# Patient Record
Sex: FEMALE | Race: BLACK OR AFRICAN AMERICAN | NOT HISPANIC OR LATINO
[De-identification: names, ages, dates, MRNs, and addresses within clinical notes are randomized per-mention and may not be internally consistent; named-entity substitution may affect disease eponyms.]

---

## 2017-05-04 ENCOUNTER — MEDICATION RENEWAL (OUTPATIENT)
Age: 62
End: 2017-05-04

## 2017-05-04 ENCOUNTER — RX RENEWAL (OUTPATIENT)
Age: 62
End: 2017-05-04

## 2017-08-14 ENCOUNTER — APPOINTMENT (OUTPATIENT)
Dept: INTERNAL MEDICINE | Facility: CLINIC | Age: 62
End: 2017-08-14
Payer: COMMERCIAL

## 2017-08-14 VITALS
BODY MASS INDEX: 22.43 KG/M2 | HEIGHT: 68 IN | WEIGHT: 148 LBS | SYSTOLIC BLOOD PRESSURE: 160 MMHG | TEMPERATURE: 97.8 F | DIASTOLIC BLOOD PRESSURE: 80 MMHG

## 2017-08-14 DIAGNOSIS — S91.011A LACERATION W/OUT FOREIGN BODY, RIGHT ANKLE, INITIAL ENCOUNTER: ICD-10-CM

## 2017-08-14 PROCEDURE — 99213 OFFICE O/P EST LOW 20 MIN: CPT

## 2017-11-15 ENCOUNTER — RX RENEWAL (OUTPATIENT)
Age: 62
End: 2017-11-15

## 2017-11-16 ENCOUNTER — APPOINTMENT (OUTPATIENT)
Dept: INTERNAL MEDICINE | Facility: CLINIC | Age: 62
End: 2017-11-16
Payer: COMMERCIAL

## 2017-11-16 VITALS
TEMPERATURE: 97 F | SYSTOLIC BLOOD PRESSURE: 132 MMHG | BODY MASS INDEX: 22.43 KG/M2 | WEIGHT: 148 LBS | DIASTOLIC BLOOD PRESSURE: 82 MMHG | HEIGHT: 68 IN

## 2017-11-16 PROCEDURE — 99214 OFFICE O/P EST MOD 30 MIN: CPT

## 2017-11-16 RX ORDER — CEPHALEXIN 500 MG/1
500 CAPSULE ORAL
Qty: 20 | Refills: 0 | Status: DISCONTINUED | COMMUNITY
Start: 2017-08-14 | End: 2017-11-16

## 2017-12-28 ENCOUNTER — LABORATORY RESULT (OUTPATIENT)
Age: 62
End: 2017-12-28

## 2017-12-28 ENCOUNTER — APPOINTMENT (OUTPATIENT)
Dept: INTERNAL MEDICINE | Facility: CLINIC | Age: 62
End: 2017-12-28
Payer: COMMERCIAL

## 2017-12-28 PROCEDURE — 36415 COLL VENOUS BLD VENIPUNCTURE: CPT

## 2018-01-03 LAB
25(OH)D3 SERPL-MCNC: 21.9 NG/ML
ALBUMIN SERPL ELPH-MCNC: 4.3 G/DL
ALP BLD-CCNC: 70 U/L
ALT SERPL-CCNC: 9 U/L
ANION GAP SERPL CALC-SCNC: 13 MMOL/L
APPEARANCE: CLEAR
AST SERPL-CCNC: 17 U/L
BASOPHILS # BLD AUTO: 0.01 K/UL
BASOPHILS NFR BLD AUTO: 0.2 %
BILIRUB SERPL-MCNC: 0.7 MG/DL
BILIRUBIN URINE: NEGATIVE
BLOOD URINE: NEGATIVE
BUN SERPL-MCNC: 18 MG/DL
CALCIUM SERPL-MCNC: 9.8 MG/DL
CHLORIDE SERPL-SCNC: 102 MMOL/L
CHOLEST SERPL-MCNC: 180 MG/DL
CHOLEST/HDLC SERPL: 2 RATIO
CO2 SERPL-SCNC: 27 MMOL/L
COLOR: YELLOW
CREAT SERPL-MCNC: 1.23 MG/DL
EOSINOPHIL # BLD AUTO: 0.15 K/UL
EOSINOPHIL NFR BLD AUTO: 2.5 %
ERYTHROCYTE [SEDIMENTATION RATE] IN BLOOD BY WESTERGREN METHOD: 13 MM/HR
GLUCOSE QUALITATIVE U: NEGATIVE MG/DL
GLUCOSE SERPL-MCNC: 94 MG/DL
HCT VFR BLD CALC: 42.4 %
HDLC SERPL-MCNC: 92 MG/DL
HGB BLD-MCNC: 14 G/DL
IMM GRANULOCYTES NFR BLD AUTO: 0.2 %
KETONES URINE: NEGATIVE
LDLC SERPL CALC-MCNC: 79 MG/DL
LDLC SERPL DIRECT ASSAY-MCNC: 87 MG/DL
LEUKOCYTE ESTERASE URINE: NEGATIVE
LYMPHOCYTES # BLD AUTO: 1.39 K/UL
LYMPHOCYTES NFR BLD AUTO: 22.9 %
MAN DIFF?: NORMAL
MCHC RBC-ENTMCNC: 32.3 PG
MCHC RBC-ENTMCNC: 33 GM/DL
MCV RBC AUTO: 97.7 FL
MONOCYTES # BLD AUTO: 0.33 K/UL
MONOCYTES NFR BLD AUTO: 5.4 %
NEUTROPHILS # BLD AUTO: 4.17 K/UL
NEUTROPHILS NFR BLD AUTO: 68.8 %
NITRITE URINE: NEGATIVE
PH URINE: 5.5
PLATELET # BLD AUTO: 331 K/UL
POTASSIUM SERPL-SCNC: 4.6 MMOL/L
PROT SERPL-MCNC: 8.3 G/DL
PROTEIN URINE: NEGATIVE MG/DL
RBC # BLD: 4.34 M/UL
RBC # FLD: 14.9 %
SAVE SPECIMEN: NORMAL
SODIUM SERPL-SCNC: 142 MMOL/L
SPECIFIC GRAVITY URINE: 1.03
T3 SERPL-MCNC: 105 NG/DL
T3RU NFR SERPL: 0.64 INDEX
T4 FREE SERPL-MCNC: 0.9 NG/DL
TRIGL SERPL-MCNC: 44 MG/DL
TSH SERPL-ACNC: 1.62 UIU/ML
UROBILINOGEN URINE: NEGATIVE MG/DL
WBC # FLD AUTO: 6.06 K/UL

## 2018-01-04 ENCOUNTER — APPOINTMENT (OUTPATIENT)
Dept: INTERNAL MEDICINE | Facility: CLINIC | Age: 63
End: 2018-01-04

## 2018-01-18 ENCOUNTER — APPOINTMENT (OUTPATIENT)
Dept: INTERNAL MEDICINE | Facility: CLINIC | Age: 63
End: 2018-01-18
Payer: COMMERCIAL

## 2018-01-18 ENCOUNTER — MEDICATION RENEWAL (OUTPATIENT)
Age: 63
End: 2018-01-18

## 2018-01-18 VITALS
BODY MASS INDEX: 21.22 KG/M2 | SYSTOLIC BLOOD PRESSURE: 130 MMHG | WEIGHT: 140 LBS | DIASTOLIC BLOOD PRESSURE: 82 MMHG | TEMPERATURE: 97.8 F | HEIGHT: 68 IN

## 2018-01-18 DIAGNOSIS — Z09 ENCOUNTER FOR FOLLOW-UP EXAMINATION AFTER COMPLETED TREATMENT FOR CONDITIONS OTHER THAN MALIGNANT NEOPLASM: ICD-10-CM

## 2018-01-18 PROCEDURE — 99396 PREV VISIT EST AGE 40-64: CPT | Mod: 25

## 2018-01-18 PROCEDURE — 94010 BREATHING CAPACITY TEST: CPT

## 2018-01-18 PROCEDURE — 93000 ELECTROCARDIOGRAM COMPLETE: CPT

## 2018-01-22 ENCOUNTER — MEDICATION RENEWAL (OUTPATIENT)
Age: 63
End: 2018-01-22

## 2018-04-25 ENCOUNTER — MEDICATION RENEWAL (OUTPATIENT)
Age: 63
End: 2018-04-25

## 2018-06-20 ENCOUNTER — RX RENEWAL (OUTPATIENT)
Age: 63
End: 2018-06-20

## 2018-06-22 ENCOUNTER — MEDICATION RENEWAL (OUTPATIENT)
Age: 63
End: 2018-06-22

## 2018-10-04 ENCOUNTER — MEDICATION RENEWAL (OUTPATIENT)
Age: 63
End: 2018-10-04

## 2018-10-22 ENCOUNTER — LABORATORY RESULT (OUTPATIENT)
Age: 63
End: 2018-10-22

## 2018-10-22 ENCOUNTER — APPOINTMENT (OUTPATIENT)
Dept: INTERNAL MEDICINE | Facility: CLINIC | Age: 63
End: 2018-10-22
Payer: COMMERCIAL

## 2018-10-22 VITALS
SYSTOLIC BLOOD PRESSURE: 130 MMHG | WEIGHT: 150 LBS | BODY MASS INDEX: 22.73 KG/M2 | DIASTOLIC BLOOD PRESSURE: 80 MMHG | TEMPERATURE: 98.4 F | HEIGHT: 68 IN

## 2018-10-22 DIAGNOSIS — Z09 ENCOUNTER FOR FOLLOW-UP EXAMINATION AFTER COMPLETED TREATMENT FOR CONDITIONS OTHER THAN MALIGNANT NEOPLASM: ICD-10-CM

## 2018-10-22 DIAGNOSIS — R92.8 OTHER ABNORMAL AND INCONCLUSIVE FINDINGS ON DIAGNOSTIC IMAGING OF BREAST: ICD-10-CM

## 2018-10-22 DIAGNOSIS — R92.0 MAMMOGRAPHIC MICROCALCIFICATION FOUND ON DIAGNOSTIC IMAGING OF BREAST: ICD-10-CM

## 2018-10-22 DIAGNOSIS — M81.0 ENCOUNTER FOR FOLLOW-UP EXAMINATION AFTER COMPLETED TREATMENT FOR CONDITIONS OTHER THAN MALIGNANT NEOPLASM: ICD-10-CM

## 2018-10-22 PROCEDURE — 99214 OFFICE O/P EST MOD 30 MIN: CPT | Mod: 25

## 2018-10-22 PROCEDURE — 36415 COLL VENOUS BLD VENIPUNCTURE: CPT

## 2018-10-24 DIAGNOSIS — R77.9 ABNORMALITY OF PLASMA PROTEIN, UNSPECIFIED: ICD-10-CM

## 2018-10-25 LAB
ALBUMIN MFR SERPL ELPH: 48.3 %
ALBUMIN SERPL-MCNC: 4.5 G/DL
ALBUMIN/GLOB SERPL: 0.9 RATIO
ALPHA1 GLOB MFR SERPL ELPH: 4.5 %
ALPHA1 GLOB SERPL ELPH-MCNC: 0.4 G/DL
ALPHA2 GLOB MFR SERPL ELPH: 9.9 %
ALPHA2 GLOB SERPL ELPH-MCNC: 0.9 G/DL
B-GLOBULIN MFR SERPL ELPH: 15.2 %
B-GLOBULIN SERPL ELPH-MCNC: 1.4 G/DL
GAMMA GLOB FLD ELPH-MCNC: 2.1 G/DL
GAMMA GLOB MFR SERPL ELPH: 22.1 %
INTERPRETATION SERPL IEP-IMP: NORMAL
PROT SERPL-MCNC: 9.3 G/DL

## 2018-11-04 ENCOUNTER — FORM ENCOUNTER (OUTPATIENT)
Age: 63
End: 2018-11-04

## 2018-11-05 ENCOUNTER — OUTPATIENT (OUTPATIENT)
Dept: OUTPATIENT SERVICES | Facility: HOSPITAL | Age: 63
LOS: 1 days | End: 2018-11-05
Payer: COMMERCIAL

## 2018-11-05 ENCOUNTER — APPOINTMENT (OUTPATIENT)
Dept: RADIOLOGY | Facility: IMAGING CENTER | Age: 63
End: 2018-11-05
Payer: COMMERCIAL

## 2018-11-05 ENCOUNTER — APPOINTMENT (OUTPATIENT)
Dept: ULTRASOUND IMAGING | Facility: IMAGING CENTER | Age: 63
End: 2018-11-05
Payer: COMMERCIAL

## 2018-11-05 DIAGNOSIS — Z09 ENCOUNTER FOR FOLLOW-UP EXAMINATION AFTER COMPLETED TREATMENT FOR CONDITIONS OTHER THAN MALIGNANT NEOPLASM: ICD-10-CM

## 2018-11-05 DIAGNOSIS — M81.0 AGE-RELATED OSTEOPOROSIS WITHOUT CURRENT PATHOLOGICAL FRACTURE: ICD-10-CM

## 2018-11-05 PROCEDURE — 76536 US EXAM OF HEAD AND NECK: CPT | Mod: 26

## 2018-11-05 PROCEDURE — 77080 DXA BONE DENSITY AXIAL: CPT

## 2018-11-05 PROCEDURE — 76536 US EXAM OF HEAD AND NECK: CPT

## 2018-11-05 PROCEDURE — 77080 DXA BONE DENSITY AXIAL: CPT | Mod: 26

## 2018-11-15 ENCOUNTER — APPOINTMENT (OUTPATIENT)
Dept: MAMMOGRAPHY | Facility: CLINIC | Age: 63
End: 2018-11-15

## 2018-11-15 ENCOUNTER — APPOINTMENT (OUTPATIENT)
Dept: INTERNAL MEDICINE | Facility: CLINIC | Age: 63
End: 2018-11-15

## 2018-11-15 ENCOUNTER — APPOINTMENT (OUTPATIENT)
Dept: ULTRASOUND IMAGING | Facility: CLINIC | Age: 63
End: 2018-11-15

## 2018-11-26 ENCOUNTER — MEDICATION RENEWAL (OUTPATIENT)
Age: 63
End: 2018-11-26

## 2018-12-06 ENCOUNTER — RX RENEWAL (OUTPATIENT)
Age: 63
End: 2018-12-06

## 2018-12-06 ENCOUNTER — MEDICATION RENEWAL (OUTPATIENT)
Age: 63
End: 2018-12-06

## 2018-12-17 ENCOUNTER — FORM ENCOUNTER (OUTPATIENT)
Age: 63
End: 2018-12-17

## 2018-12-18 ENCOUNTER — APPOINTMENT (OUTPATIENT)
Dept: MAMMOGRAPHY | Facility: CLINIC | Age: 63
End: 2018-12-18
Payer: COMMERCIAL

## 2018-12-18 ENCOUNTER — APPOINTMENT (OUTPATIENT)
Dept: ULTRASOUND IMAGING | Facility: CLINIC | Age: 63
End: 2018-12-18
Payer: COMMERCIAL

## 2018-12-18 ENCOUNTER — OUTPATIENT (OUTPATIENT)
Dept: OUTPATIENT SERVICES | Facility: HOSPITAL | Age: 63
LOS: 1 days | End: 2018-12-18
Payer: COMMERCIAL

## 2018-12-18 DIAGNOSIS — Z00.8 ENCOUNTER FOR OTHER GENERAL EXAMINATION: ICD-10-CM

## 2018-12-18 DIAGNOSIS — R92.2 INCONCLUSIVE MAMMOGRAM: ICD-10-CM

## 2018-12-18 DIAGNOSIS — R92.8 OTHER ABNORMAL AND INCONCLUSIVE FINDINGS ON DIAGNOSTIC IMAGING OF BREAST: ICD-10-CM

## 2018-12-18 PROCEDURE — 77067 SCR MAMMO BI INCL CAD: CPT

## 2018-12-18 PROCEDURE — 77067 SCR MAMMO BI INCL CAD: CPT | Mod: 26

## 2018-12-18 PROCEDURE — 76641 ULTRASOUND BREAST COMPLETE: CPT | Mod: 26,50

## 2018-12-18 PROCEDURE — 77063 BREAST TOMOSYNTHESIS BI: CPT | Mod: 26

## 2018-12-18 PROCEDURE — 77063 BREAST TOMOSYNTHESIS BI: CPT

## 2018-12-18 PROCEDURE — 76641 ULTRASOUND BREAST COMPLETE: CPT

## 2019-01-03 NOTE — PHYSICAL EXAM

## 2019-01-03 NOTE — HISTORY OF PRESENT ILLNESS
[FreeTextEntry1] : Patient presents for follow up evaluation for multiple medical concerns including:recent abnormal mammogram-may require excisional biopsy, f/u for enlarged goiter and to discuss treatment for osteoporosis.

## 2019-01-15 ENCOUNTER — RESULT REVIEW (OUTPATIENT)
Age: 64
End: 2019-01-15

## 2019-01-15 ENCOUNTER — APPOINTMENT (OUTPATIENT)
Dept: ULTRASOUND IMAGING | Facility: IMAGING CENTER | Age: 64
End: 2019-01-15
Payer: COMMERCIAL

## 2019-01-15 ENCOUNTER — OUTPATIENT (OUTPATIENT)
Dept: OUTPATIENT SERVICES | Facility: HOSPITAL | Age: 64
LOS: 1 days | End: 2019-01-15
Payer: COMMERCIAL

## 2019-01-15 DIAGNOSIS — Z86.000 PERSONAL HISTORY OF IN-SITU NEOPLASM OF BREAST: ICD-10-CM

## 2019-01-15 DIAGNOSIS — R92.8 OTHER ABNORMAL AND INCONCLUSIVE FINDINGS ON DIAGNOSTIC IMAGING OF BREAST: ICD-10-CM

## 2019-01-15 PROCEDURE — 88360 TUMOR IMMUNOHISTOCHEM/MANUAL: CPT | Mod: 26

## 2019-01-15 PROCEDURE — G0279: CPT | Mod: 26

## 2019-01-15 PROCEDURE — 77065 DX MAMMO INCL CAD UNI: CPT | Mod: 26,LT

## 2019-01-15 PROCEDURE — 88305 TISSUE EXAM BY PATHOLOGIST: CPT | Mod: 26

## 2019-01-17 ENCOUNTER — RX RENEWAL (OUTPATIENT)
Age: 64
End: 2019-01-17

## 2019-01-17 PROCEDURE — 19083 BX BREAST 1ST LESION US IMAG: CPT | Mod: LT

## 2019-01-17 PROCEDURE — 88305 TISSUE EXAM BY PATHOLOGIST: CPT

## 2019-01-17 PROCEDURE — 19083 BX BREAST 1ST LESION US IMAG: CPT

## 2019-01-17 PROCEDURE — 19286 PERQ DEV BREAST ADD US IMAG: CPT

## 2019-01-17 PROCEDURE — C1739: CPT

## 2019-01-17 PROCEDURE — G0279: CPT

## 2019-01-17 PROCEDURE — 19285 PERQ DEV BREAST 1ST US IMAG: CPT

## 2019-01-17 PROCEDURE — 77065 DX MAMMO INCL CAD UNI: CPT

## 2019-01-17 PROCEDURE — 19286 PERQ DEV BREAST ADD US IMAG: CPT | Mod: LT,59

## 2019-01-17 PROCEDURE — 88360 TUMOR IMMUNOHISTOCHEM/MANUAL: CPT

## 2019-01-17 PROCEDURE — 19285 PERQ DEV BREAST 1ST US IMAG: CPT | Mod: LT,59

## 2019-01-21 ENCOUNTER — LABORATORY RESULT (OUTPATIENT)
Age: 64
End: 2019-01-21

## 2019-01-21 ENCOUNTER — APPOINTMENT (OUTPATIENT)
Dept: INTERNAL MEDICINE | Facility: CLINIC | Age: 64
End: 2019-01-21
Payer: COMMERCIAL

## 2019-01-21 PROCEDURE — 36415 COLL VENOUS BLD VENIPUNCTURE: CPT

## 2019-01-28 ENCOUNTER — RX RENEWAL (OUTPATIENT)
Age: 64
End: 2019-01-28

## 2019-01-28 ENCOUNTER — APPOINTMENT (OUTPATIENT)
Dept: MRI IMAGING | Facility: IMAGING CENTER | Age: 64
End: 2019-01-28
Payer: COMMERCIAL

## 2019-01-28 ENCOUNTER — APPOINTMENT (OUTPATIENT)
Dept: RADIOLOGY | Facility: IMAGING CENTER | Age: 64
End: 2019-01-28
Payer: COMMERCIAL

## 2019-01-28 ENCOUNTER — OUTPATIENT (OUTPATIENT)
Dept: OUTPATIENT SERVICES | Facility: HOSPITAL | Age: 64
LOS: 1 days | End: 2019-01-28
Payer: COMMERCIAL

## 2019-01-28 DIAGNOSIS — Z00.8 ENCOUNTER FOR OTHER GENERAL EXAMINATION: ICD-10-CM

## 2019-01-28 PROCEDURE — C8908: CPT

## 2019-01-28 PROCEDURE — A9585: CPT

## 2019-01-28 PROCEDURE — 71046 X-RAY EXAM CHEST 2 VIEWS: CPT

## 2019-01-28 PROCEDURE — 77049 MRI BREAST C-+ W/CAD BI: CPT | Mod: 26

## 2019-01-28 PROCEDURE — C8937: CPT

## 2019-01-28 PROCEDURE — 71046 X-RAY EXAM CHEST 2 VIEWS: CPT | Mod: 26

## 2019-02-14 ENCOUNTER — APPOINTMENT (OUTPATIENT)
Dept: INTERNAL MEDICINE | Facility: CLINIC | Age: 64
End: 2019-02-14
Payer: COMMERCIAL

## 2019-02-14 ENCOUNTER — LABORATORY RESULT (OUTPATIENT)
Age: 64
End: 2019-02-14

## 2019-02-14 VITALS
DIASTOLIC BLOOD PRESSURE: 90 MMHG | TEMPERATURE: 97.4 F | HEART RATE: 80 BPM | BODY MASS INDEX: 23.19 KG/M2 | WEIGHT: 153 LBS | SYSTOLIC BLOOD PRESSURE: 140 MMHG | OXYGEN SATURATION: 98 % | HEIGHT: 68 IN

## 2019-02-14 VITALS — DIASTOLIC BLOOD PRESSURE: 90 MMHG | SYSTOLIC BLOOD PRESSURE: 136 MMHG

## 2019-02-14 DIAGNOSIS — Z01.818 ENCOUNTER FOR OTHER PREPROCEDURAL EXAMINATION: ICD-10-CM

## 2019-02-14 PROCEDURE — 93000 ELECTROCARDIOGRAM COMPLETE: CPT

## 2019-02-14 PROCEDURE — 36415 COLL VENOUS BLD VENIPUNCTURE: CPT

## 2019-02-14 PROCEDURE — 99214 OFFICE O/P EST MOD 30 MIN: CPT | Mod: 25

## 2019-02-14 RX ORDER — ALENDRONATE SODIUM 70 MG/1
70 TABLET ORAL
Qty: 12 | Refills: 3 | Status: DISCONTINUED | COMMUNITY
Start: 2017-11-16 | End: 2019-02-14

## 2019-02-14 RX ORDER — HYDROCODONE BITARTRATE AND HOMATROPINE METHYLBROMIDE 5; 1.5 MG/5ML; MG/5ML
5-1.5 SYRUP ORAL
Qty: 480 | Refills: 0 | Status: DISCONTINUED | COMMUNITY
Start: 2018-11-07 | End: 2019-02-14

## 2019-02-14 NOTE — PHYSICAL EXAM
[No Acute Distress] : no acute distress [Well Nourished] : well nourished [Well Developed] : well developed [Normal Sclera/Conjunctiva] : normal sclera/conjunctiva [PERRL] : pupils equal round and reactive to light [No JVD] : no jugular venous distention [Supple] : supple [No Lymphadenopathy] : no lymphadenopathy [No Respiratory Distress] : no respiratory distress  [Clear to Auscultation] : lungs were clear to auscultation bilaterally [No Accessory Muscle Use] : no accessory muscle use [Normal Rate] : normal rate  [Regular Rhythm] : with a regular rhythm [Pedal Pulses Present] : the pedal pulses are present [No Edema] : there was no peripheral edema [Normal Posterior Cervical Nodes] : no posterior cervical lymphadenopathy [Normal Anterior Cervical Nodes] : no anterior cervical lymphadenopathy [No CVA Tenderness] : no CVA  tenderness [No Joint Swelling] : no joint swelling [Grossly Normal Strength/Tone] : grossly normal strength/tone [No Rash] : no rash [Normal Affect] : the affect was normal [Normal Insight/Judgement] : insight and judgment were intact

## 2019-02-20 LAB
ALBUMIN SERPL ELPH-MCNC: 4.4 G/DL
ALP BLD-CCNC: 65 U/L
ALT SERPL-CCNC: 11 U/L
ANION GAP SERPL CALC-SCNC: 14 MMOL/L
APTT BLD: 32.1 SEC
AST SERPL-CCNC: 21 U/L
BASOPHILS # BLD AUTO: 0.01 K/UL
BASOPHILS NFR BLD AUTO: 0.2 %
BILIRUB SERPL-MCNC: 0.7 MG/DL
BUN SERPL-MCNC: 20 MG/DL
CALCIUM SERPL-MCNC: 9.9 MG/DL
CHLORIDE SERPL-SCNC: 101 MMOL/L
CO2 SERPL-SCNC: 25 MMOL/L
CREAT SERPL-MCNC: 1.12 MG/DL
EOSINOPHIL # BLD AUTO: 0.11 K/UL
EOSINOPHIL NFR BLD AUTO: 2.2 %
GLUCOSE SERPL-MCNC: 88 MG/DL
HCT VFR BLD CALC: 37.7 %
HGB BLD-MCNC: 12.2 G/DL
IMM GRANULOCYTES NFR BLD AUTO: 0.4 %
INR PPP: 1.01 RATIO
LYMPHOCYTES # BLD AUTO: 1.46 K/UL
LYMPHOCYTES NFR BLD AUTO: 29.6 %
MAN DIFF?: NORMAL
MCHC RBC-ENTMCNC: 31.7 PG
MCHC RBC-ENTMCNC: 32.4 GM/DL
MCV RBC AUTO: 97.9 FL
MONOCYTES # BLD AUTO: 0.39 K/UL
MONOCYTES NFR BLD AUTO: 7.9 %
NEUTROPHILS # BLD AUTO: 2.94 K/UL
NEUTROPHILS NFR BLD AUTO: 59.7 %
PLATELET # BLD AUTO: 308 K/UL
POTASSIUM SERPL-SCNC: 4.3 MMOL/L
PROT SERPL-MCNC: 8.5 G/DL
PT BLD: 11.5 SEC
RBC # BLD: 3.85 M/UL
RBC # FLD: 16.2 %
SODIUM SERPL-SCNC: 140 MMOL/L
WBC # FLD AUTO: 4.93 K/UL

## 2019-02-20 NOTE — RESULTS/DATA
[ECG Reviewed] : reviewed [No Acute Ischemia] : No acute ischemia [NSR] : normal sinus rhythm [No Interval Change] : no interval change [] : results reviewed

## 2019-02-20 NOTE — HISTORY OF PRESENT ILLNESS
[No Pertinent Cardiac History] : no history of aortic stenosis, atrial fibrillation, coronary artery disease, recent myocardial infarction, or implantable device/pacemaker [Smoker] : smoker [No Adverse Anesthesia Reaction] : no adverse anesthesia reaction in self or family member [(Patient denies any chest pain, claudication, dyspnea on exertion, orthopnea, palpitations or syncope)] : Patient denies any chest pain, claudication, dyspnea on exertion, orthopnea, palpitations or syncope [Good (7-10 METs)] : Good (7-10 METs) [Asthma] : no asthma [COPD] : no COPD [Sleep Apnea] : no sleep apnea [Chronic Anticoagulation] : no chronic anticoagulation [Chronic Kidney Disease] : no chronic kidney disease [Diabetes] : no diabetes [FreeTextEntry1] : Left breast Lumpectomy [FreeTextEntry2] : 2/21/2019 [FreeTextEntry3] : Dr. Vivek Mayfield

## 2019-02-20 NOTE — ASSESSMENT
[High Risk Surgery - Intraperitoneal, Intrathoracic or Supringuinal Vascular Procedures] : High Risk Surgery - Intraperitoneal, Intrathoracic or Supringuinal Vascular Procedures - No (0) [Ischemic Heart Disease] : Ischemic Heart Disease - No (0) [Congestive Heart Failure] : Congestive Heart Failure - No (0) [Prior Cerebrovascular Accident or TIA] : Prior Cerebrovascular Accident or TIA - No (0) [Creatinine >= 2mg/dL (1 Point)] : Creatinine >= 2mg/dL - No (0) [Insulin-dependent Diabetic (1 Point)] : Insulin-dependent Diabetic - No (0) [0] : 0 , RCRI Class: I, Risk of Post-Op Cardiac Complications: 0.4%, Procedure Risk: Low-Risk [No Further Testing Recommended] : no further testing recommended [Modify anti-platelet treatment prior to procedure] : Modify anti-platelet treatment prior to procedure [Continue medications as is] : Continue current medications [As per surgery] : as per surgery [Patient Optimized for Surgery] : Patient optimized for surgery [FreeTextEntry6] : Avoid ASA/NSAIDS 5 days before surgery

## 2019-02-25 ENCOUNTER — TRANSCRIPTION ENCOUNTER (OUTPATIENT)
Age: 64
End: 2019-02-25

## 2019-03-13 ENCOUNTER — RX RENEWAL (OUTPATIENT)
Age: 64
End: 2019-03-13

## 2019-04-11 ENCOUNTER — LABORATORY RESULT (OUTPATIENT)
Age: 64
End: 2019-04-11

## 2019-04-11 ENCOUNTER — APPOINTMENT (OUTPATIENT)
Dept: INTERNAL MEDICINE | Facility: CLINIC | Age: 64
End: 2019-04-11
Payer: COMMERCIAL

## 2019-04-11 VITALS
WEIGHT: 152 LBS | BODY MASS INDEX: 23.04 KG/M2 | DIASTOLIC BLOOD PRESSURE: 80 MMHG | HEIGHT: 68 IN | TEMPERATURE: 97 F | SYSTOLIC BLOOD PRESSURE: 140 MMHG

## 2019-04-11 DIAGNOSIS — F17.200 NICOTINE DEPENDENCE, UNSPECIFIED, UNCOMPLICATED: ICD-10-CM

## 2019-04-11 DIAGNOSIS — K52.9 NONINFECTIVE GASTROENTERITIS AND COLITIS, UNSPECIFIED: ICD-10-CM

## 2019-04-11 DIAGNOSIS — T75.3XXA MOTION SICKNESS, INITIAL ENCOUNTER: ICD-10-CM

## 2019-04-11 DIAGNOSIS — Z87.891 PERSONAL HISTORY OF NICOTINE DEPENDENCE: ICD-10-CM

## 2019-04-11 PROCEDURE — 36415 COLL VENOUS BLD VENIPUNCTURE: CPT

## 2019-04-11 PROCEDURE — 94010 BREATHING CAPACITY TEST: CPT

## 2019-04-11 PROCEDURE — 99396 PREV VISIT EST AGE 40-64: CPT | Mod: 25

## 2019-04-11 NOTE — DATA REVIEWED
[FreeTextEntry1] : pt declined EKG today-had EKG with recent preop visit.\par PFT revealed mild restriction-not significantly changed from PFT from 01/18/2018\par

## 2019-04-11 NOTE — PHYSICAL EXAM
[de-identified] : s/p old left lumpectomy scar (from 19 years ago at 7 O'clock position) and new lumpectomy scar along left outer breast- with localized post radiation changes and induration/ s./p left axillary LN dissection scar-c/d/i

## 2019-04-11 NOTE — ASSESSMENT
[FreeTextEntry1] : I discussed Shingrix vaccine with pt.\par She had Zostavax 2 years ago.  She will hold off on the Shingrix vaccine for now.

## 2019-04-11 NOTE — HEALTH RISK ASSESSMENT
[Patient reported colonoscopy was normal] : Patient reported colonoscopy was normal [Patient reported mammogram was abnormal] : Patient reported mammogram was abnormal [Good] : ~his/her~  mood as  good [16-20] : 16-20 [de-identified] : quit 2/2019 before her breast surgery [] : No [MammogramDate] : 01/19 [de-identified] : drinks1-2 runm cocktails every night [MammogramComments] : was diagnosed with breast cancer-will f/u with Dr. Mayfield 9/2019 [PapSmearDate] : 04/18 [PapSmearComments] : with Dr. Martinez [BoneDensityDate] : 11/18 [ColonoscopyDate] : 11/16 [BoneDensityComments] : T scores were -2.1 and -1.6 [ColonoscopyComments] : with [facility]

## 2019-04-11 NOTE — HISTORY OF PRESENT ILLNESS
[FreeTextEntry1] : Patient presents today for CPE.\par  [de-identified] : Pt was diagnosed with invasive ductal carcinoma of the left breast- underwent  left lumpectomy 2/21/2019 with Dr. Vivek Mayfield at [facility] and had intra-operative localized XRT. 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  \par She had f/u with oncologist, Dr. Mavis Wharton in [facility] and they are awaiting pathology from surgical specimen to see if she will require hormonal treatment or possible adjuvant chemotherapy.\par \par Quit smoking with acupuncture -7 weeks ago- shortly before surgery and doing well since stopping.\par \par She enjoys traveling.  She traveled to [facility] 3 weeks post lumpectomy with her sister and niece.\par She will be traveling to [place] with her other sister next week.\par \par For the past 2 months-she complains of daily watery diarrheal symptoms-2-3 times a day with copious mucous at times and occasional blood from internal hemorrhoids.  No significant abdominal cramping/ no fevers or weight loss but concerned the diarrhea is not resolving.  She has not had a well formed stool for 2 months since before her lumpectomy.\par

## 2019-04-15 LAB
25(OH)D3 SERPL-MCNC: 18.1 NG/ML
ALBUMIN SERPL ELPH-MCNC: 4.9 G/DL
ALP BLD-CCNC: 70 U/L
ALT SERPL-CCNC: 12 U/L
ANION GAP SERPL CALC-SCNC: 17 MMOL/L
APPEARANCE: ABNORMAL
AST SERPL-CCNC: 21 U/L
BASOPHILS # BLD AUTO: 0.03 K/UL
BASOPHILS NFR BLD AUTO: 0.7 %
BILIRUB SERPL-MCNC: 0.7 MG/DL
BILIRUBIN URINE: NEGATIVE
BLOOD URINE: NEGATIVE
BUN SERPL-MCNC: 14 MG/DL
CALCIUM SERPL-MCNC: 9.8 MG/DL
CHLORIDE SERPL-SCNC: 101 MMOL/L
CHOLEST SERPL-MCNC: 211 MG/DL
CHOLEST/HDLC SERPL: 2.5 RATIO
CO2 SERPL-SCNC: 24 MMOL/L
COLOR: YELLOW
CREAT SERPL-MCNC: 1.14 MG/DL
EOSINOPHIL # BLD AUTO: 0.19 K/UL
EOSINOPHIL NFR BLD AUTO: 4.1 %
ERYTHROCYTE [SEDIMENTATION RATE] IN BLOOD BY WESTERGREN METHOD: 9 MM/HR
ESTIMATED AVERAGE GLUCOSE: 105 MG/DL
GLUCOSE QUALITATIVE U: NEGATIVE
GLUCOSE SERPL-MCNC: 84 MG/DL
HBA1C MFR BLD HPLC: 5.3 %
HCT VFR BLD CALC: 42.6 %
HDLC SERPL-MCNC: 86 MG/DL
HGB BLD-MCNC: 13.3 G/DL
IMM GRANULOCYTES NFR BLD AUTO: 0.2 %
KETONES URINE: NEGATIVE
LDLC SERPL CALC-MCNC: 110 MG/DL
LDLC SERPL DIRECT ASSAY-MCNC: 121 MG/DL
LEUKOCYTE ESTERASE URINE: NEGATIVE
LYMPHOCYTES # BLD AUTO: 2.02 K/UL
LYMPHOCYTES NFR BLD AUTO: 43.8 %
MAN DIFF?: NORMAL
MCHC RBC-ENTMCNC: 31.1 PG
MCHC RBC-ENTMCNC: 31.2 GM/DL
MCV RBC AUTO: 99.8 FL
MONOCYTES # BLD AUTO: 0.45 K/UL
MONOCYTES NFR BLD AUTO: 9.8 %
NEUTROPHILS # BLD AUTO: 1.91 K/UL
NEUTROPHILS NFR BLD AUTO: 41.4 %
NITRITE URINE: NEGATIVE
PH URINE: 6
PLATELET # BLD AUTO: 303 K/UL
POTASSIUM SERPL-SCNC: 4.2 MMOL/L
PROT SERPL-MCNC: 8.8 G/DL
PROTEIN URINE: NORMAL
RBC # BLD: 4.27 M/UL
RBC # FLD: 15 %
SAVE SPECIMEN: NORMAL
SODIUM SERPL-SCNC: 142 MMOL/L
SPECIFIC GRAVITY URINE: 1.02
T3 SERPL-MCNC: 118 NG/DL
T3FREE SERPL-MCNC: 3.25 PG/ML
T3RU NFR SERPL: 0.7 TBI
T4 FREE SERPL-MCNC: 1 NG/DL
T4 SERPL-MCNC: 6.1 UG/DL
TRIGL SERPL-MCNC: 75 MG/DL
TSH SERPL-ACNC: 5.42 UIU/ML
UROBILINOGEN URINE: NORMAL
WBC # FLD AUTO: 4.61 K/UL

## 2019-04-16 ENCOUNTER — TRANSCRIPTION ENCOUNTER (OUTPATIENT)
Age: 64
End: 2019-04-16

## 2019-04-29 DIAGNOSIS — A49.8 OTHER BACTERIAL INFECTIONS OF UNSPECIFIED SITE: ICD-10-CM

## 2019-05-01 PROBLEM — A49.8 INFECTION DUE TO NON-O157 SHIGA TOXIN-PRODUCING ESCHERICHIA COLI (E.COLI): Status: ACTIVE | Noted: 2019-05-01

## 2019-05-01 LAB
C DIFF TOX GENS STL QL NAA+PROBE: NORMAL
CDIFF BY PCR: NOT DETECTED
G LAMBLIA AG STL QL: NORMAL

## 2019-05-01 RX ORDER — AZITHROMYCIN 250 MG/1
250 TABLET, FILM COATED ORAL
Qty: 1 | Refills: 0 | Status: COMPLETED | COMMUNITY
Start: 2019-04-11 | End: 2019-05-01

## 2019-05-03 LAB — GI PCR PANEL, STOOL: ABNORMAL

## 2019-05-16 DIAGNOSIS — R19.7 DIARRHEA, UNSPECIFIED: ICD-10-CM

## 2019-05-16 DIAGNOSIS — K92.1 MELENA: ICD-10-CM

## 2019-05-18 ENCOUNTER — MOBILE ON CALL (OUTPATIENT)
Age: 64
End: 2019-05-18

## 2019-05-20 LAB
BACTERIA STL CULT: NORMAL
C DIFF TOX GENS STL QL NAA+PROBE: NORMAL
CDIFF BY PCR: NOT DETECTED
GI PCR PANEL, STOOL: NORMAL

## 2019-05-21 LAB — DEPRECATED O AND P PREP STL: NORMAL

## 2019-06-10 ENCOUNTER — EMERGENCY (EMERGENCY)
Facility: HOSPITAL | Age: 64
LOS: 1 days | Discharge: HOME | End: 2019-06-10
Attending: EMERGENCY MEDICINE
Payer: COMMERCIAL

## 2019-06-10 ENCOUNTER — APPOINTMENT (OUTPATIENT)
Dept: INTERNAL MEDICINE | Facility: CLINIC | Age: 64
End: 2019-06-10
Payer: COMMERCIAL

## 2019-06-10 VITALS
SYSTOLIC BLOOD PRESSURE: 173 MMHG | OXYGEN SATURATION: 98 % | HEART RATE: 72 BPM | TEMPERATURE: 99 F | WEIGHT: 153 LBS | DIASTOLIC BLOOD PRESSURE: 99 MMHG | HEIGHT: 68 IN | RESPIRATION RATE: 16 BRPM

## 2019-06-10 VITALS
DIASTOLIC BLOOD PRESSURE: 90 MMHG | HEIGHT: 68 IN | WEIGHT: 153 LBS | BODY MASS INDEX: 23.19 KG/M2 | SYSTOLIC BLOOD PRESSURE: 140 MMHG | TEMPERATURE: 97.6 F

## 2019-06-10 VITALS — SYSTOLIC BLOOD PRESSURE: 161 MMHG | DIASTOLIC BLOOD PRESSURE: 94 MMHG | HEART RATE: 65 BPM

## 2019-06-10 DIAGNOSIS — S09.90XA UNSPECIFIED INJURY OF HEAD, INITIAL ENCOUNTER: ICD-10-CM
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
## 2019-06-10 DIAGNOSIS — Z98.890 OTHER SPECIFIED POSTPROCEDURAL STATES: Chronic | ICD-10-CM

## 2019-06-10 PROCEDURE — 73502 X-RAY EXAM HIP UNI 2-3 VIEWS: CPT | Mod: 26,RT

## 2019-06-10 PROCEDURE — 72220 X-RAY EXAM SACRUM TAILBONE: CPT | Mod: 26

## 2019-06-10 PROCEDURE — 99283 EMERGENCY DEPT VISIT LOW MDM: CPT

## 2019-06-10 PROCEDURE — 99284 EMERGENCY DEPT VISIT MOD MDM: CPT

## 2019-06-10 PROCEDURE — 99213 OFFICE O/P EST LOW 20 MIN: CPT

## 2019-06-10 PROCEDURE — 73502 X-RAY EXAM HIP UNI 2-3 VIEWS: CPT

## 2019-06-10 PROCEDURE — 72220 X-RAY EXAM SACRUM TAILBONE: CPT

## 2019-06-10 RX ORDER — ACETAMINOPHEN 500 MG
650 TABLET ORAL ONCE
Refills: 0 | Status: COMPLETED | OUTPATIENT
Start: 2019-06-10 | End: 2019-06-10

## 2019-06-10 RX ORDER — OXYCODONE AND ACETAMINOPHEN 5; 325 MG/1; MG/1
1 TABLET ORAL ONCE
Refills: 0 | Status: DISCONTINUED | OUTPATIENT
Start: 2019-06-10 | End: 2019-06-10

## 2019-06-10 RX ORDER — OXYCODONE HYDROCHLORIDE 5 MG/1
1 TABLET ORAL
Qty: 12 | Refills: 0
Start: 2019-06-10 | End: 2019-06-13

## 2019-06-10 RX ORDER — CIPROFLOXACIN HYDROCHLORIDE 500 MG/1
500 TABLET, FILM COATED ORAL TWICE DAILY
Qty: 10 | Refills: 0 | Status: DISCONTINUED | COMMUNITY
Start: 2019-05-01 | End: 2019-06-10

## 2019-06-10 RX ADMIN — OXYCODONE AND ACETAMINOPHEN 1 TABLET(S): 5; 325 TABLET ORAL at 13:35

## 2019-06-10 RX ADMIN — Medication 650 MILLIGRAM(S): at 13:35

## 2019-06-10 RX ADMIN — Medication 650 MILLIGRAM(S): at 12:58

## 2019-06-10 RX ADMIN — OXYCODONE AND ACETAMINOPHEN 1 TABLET(S): 5; 325 TABLET ORAL at 12:58

## 2019-06-10 NOTE — ED PROVIDER NOTE - CARE PLAN
Principal Discharge DX:	Gluteal pain  Secondary Diagnosis:	Fall, initial encounter  Secondary Diagnosis:	Musculoskeletal pain

## 2019-06-10 NOTE — ED PROVIDER NOTE - PHYSICAL EXAMINATION
*GEN: NAD; well appearing; A+O x3   *HEAD: NC/AT   *EYES/NOSE: PERRL & EOMI b/l  *THROAT: airway patent, moist mucous membranes  *NECK: Neck supple, no masses, no midline vertebral ttp, paracervical ttp  *PULMONARY: CTA b/l, symmetric breath sounds.   *CARDIAC: s1s2, regular rhythm, no Murmur  *ABDOMEN:  ND, NT, soft, no guarding, no rebound, no masses   *BACK: no CVA tenderness, Normal  spine, no midline vertebral ttp; ttp R soft tissue gluteal region  *EXTREMITIES: symmetric pulses, 2+ dp & radial pulses, capillary refill < 2 seconds, no cyanosis, no edema   *SKIN: no rash or bruising, occiput hematoma that is not boggy  *NEUROLOGIC: alert, CN 2-12 intact, moves all 4 extremities, full active & passive ROM in all extremities, able to walk independently w/o assistance  *PSYCH: insight and judgment nl, memory nl, affect nl, thought nl

## 2019-06-10 NOTE — ED PROVIDER NOTE - NS ED ROS FT
Review of Systems:  	•	CONSTITUTIONAL: no fever  	•	SKIN: no rash  	•	RESPIRATORY: no shortness of breath  	•	CARDIAC: no chest pain, no palpitations  	•	GI:  no abd pain, no nausea, no vomiting, no diarrhea  	•	GENITO-URINARY:  no dysuria; no hematuria    	•	MUSCULOSKELETAL:  no back pain, + neck pain, + R gluteal pain  	•	NEUROLOGIC: no weakness  	•	ALLERGY: no rhinitis  	•	PSYSCHIATRIC: no anxiety

## 2019-06-10 NOTE — ED PROVIDER NOTE - ATTENDING CONTRIBUTION TO CARE
I have seen and evaluated this patient with the resident.   I agree with the findings  unless other wise stated.  I have made appropriate changes in documentations where needed, After my face to face bedside evaluation, I am further  noting: Pt with  R soft tissue gluteal region w/o any point bony ttp but will xray to eval for fx. 4d s/p fall with head trauma with resolving occiput hematoma, no HA, normal neuro exam & pt not on any AC. shared decision made with pt to not ct h. bp elevated likely due to pain, will recheck after analgesics

## 2019-06-10 NOTE — ED ADULT NURSE NOTE - OBJECTIVE STATEMENT
64 y.o female arrived via EMS from PMD for fall that occurred in [place] ([facility]) on Thursday 6/6/19. Pt was around the pool when she got up to use bathroom. She was rushing to get to facilities when she "slipped". Pt doesn't recall all events of fall and s/p fall. Pt was drinking at time of event but not in large quantities. Swelling to back of head upon exam. Pt denies HA. No vomiting. Denies nausea. no change in vision. Follows commands. Pt able to ambulate but c discomfort to R hip. Not on blood thinners. Lumpectomy 2/2019. Not currently on chemo.

## 2019-06-10 NOTE — ED PROVIDER NOTE - CLINICAL SUMMARY MEDICAL DECISION MAKING FREE TEXT BOX
ttp R soft tissue gluteal region w/o any point bony ttp but will xray to eval for fx. 4d s/p fall with head trauma with resolving occiput hematoma, no HA, normal neuro exam & pt not on any AC. shared decision made with pt to not ct h. bp elevated likely due to pain, will recheck after analgesics

## 2019-06-10 NOTE — ED PROVIDER NOTE - OBJECTIVE STATEMENT
Pertinent HPI/PMH/PSH/FHx/SHx and Review of Systems contained within  HPI:  64yoF bibems from PMD office with CC R hip pain. 4d while visiting [place] fell near pool.   PMH/PSH: breast cancer, sleep disturbance on valium & ambien  ROS positive for:   ROS negative for: fever, Chest pain, SOB, Nausea, vomiting, diarrhea, abdominal pain, dysuria    FamilyHx and SocialHx not otherwise contributory  PMD: Arely Leonard Pertinent HPI/PMH/PSH/FHx/SHx and Review of Systems contained within  HPI:  64yoF bibems from PMD office with CC R hip pain x4d s/p fall. pain around R posterior gluteal region, 4d while visiting [place] pt slipped & fell near pool with loc & head trauma. had occiput swelling which is resolving. ROS positive for: paracervical neck pain  PMH/PSH: breast cancer, sleep disturbance on valium & ambien    ROS negative for: fever, Chest pain, SOB, Nausea, vomiting, diarrhea, abdominal pain, dysuria    FamilyHx and SocialHx not otherwise contributory  PMD: Arely Lenoard Pertinent HPI/PMH/PSH/FHx/SHx and Review of Systems contained within  HPI:  64yoF bibems from PMD office with CC R hip pain x4d s/p fall. pain around R posterior gluteal region, worse w/ standing up. 4d while visiting [place] pt slipped & fell near pool with loc & head trauma. had occiput swelling which is resolving. ROS positive for: paracervical neck pain  PMH/PSH: breast cancer, sleep disturbance on valium & ambien  ROS negative for: fever, Chest pain, SOB, Nausea, vomiting, diarrhea, abdominal pain, HA, urinary fecal incontinence  FamilyHx and SocialHx not otherwise contributory  PMD: Arely Leonard

## 2019-06-10 NOTE — ED PROVIDER NOTE - NSFOLLOWUPINSTRUCTIONS_ED_ALL_ED_FT
FOLLOW UP WITH PMD  WITHIN 1-2DAYS, CALL TO MAKE APPOINTMENT  COME BACK TO ED IF YOUR CONDITION WORSENS OR IF YOU DEVELOP FEVER GREATER THAN 100.4F, CHEST PAIN,  SHORTNESS OF BREATH OR ANY OTHER SYMPTOMS CONCERNING TO YOU  TAKE TYLENOL (ACETAMINOPHEN) 650 MG EVERY 6 HOURS AS NEEDED FOR PAIN    IF YOU WERE PRESRCIBED ANY MEDICATIONS FROM TODAY'S VISIT, TAKE THEM AS DIRECTED  (OXYCODONE)

## 2019-06-10 NOTE — ED ADULT NURSE NOTE - NSIMPLEMENTINTERV_GEN_ALL_ED
Implemented All Fall Risk Interventions:  Nancy to call system. Call bell, personal items and telephone within reach. Instruct patient to call for assistance. Room bathroom lighting operational. Non-slip footwear when patient is off stretcher. Physically safe environment: no spills, clutter or unnecessary equipment. Stretcher in lowest position, wheels locked, appropriate side rails in place. Provide visual cue, wrist band, yellow gown, etc. Monitor gait and stability. Monitor for mental status changes and reorient to person, place, and time. Review medications for side effects contributing to fall risk. Reinforce activity limits and safety measures with patient and family.

## 2019-06-12 ENCOUNTER — LABORATORY RESULT (OUTPATIENT)
Age: 64
End: 2019-06-12

## 2019-06-12 ENCOUNTER — APPOINTMENT (OUTPATIENT)
Dept: GASTROENTEROLOGY | Facility: CLINIC | Age: 64
End: 2019-06-12
Payer: COMMERCIAL

## 2019-06-12 DIAGNOSIS — K52.3 INDETERMINATE COLITIS: ICD-10-CM

## 2019-06-12 PROCEDURE — 45380 COLONOSCOPY AND BIOPSY: CPT

## 2019-08-01 LAB
BAKER'S YEAST AB QL: 8.2 UNITS
BAKER'S YEAST IGA QL IA: 9.1 UNITS
BAKER'S YEAST IGA QN IA: NEGATIVE
BAKER'S YEAST IGG QN IA: NEGATIVE
MPO AB + PR3 PNL SER: NORMAL

## 2019-08-26 ENCOUNTER — APPOINTMENT (OUTPATIENT)
Dept: INTERNAL MEDICINE | Facility: CLINIC | Age: 64
End: 2019-08-26
Payer: COMMERCIAL

## 2019-08-26 VITALS
SYSTOLIC BLOOD PRESSURE: 136 MMHG | BODY MASS INDEX: 24.1 KG/M2 | OXYGEN SATURATION: 98 % | HEART RATE: 68 BPM | HEIGHT: 68 IN | TEMPERATURE: 97.2 F | DIASTOLIC BLOOD PRESSURE: 84 MMHG | WEIGHT: 159 LBS

## 2019-08-26 VITALS — DIASTOLIC BLOOD PRESSURE: 90 MMHG | SYSTOLIC BLOOD PRESSURE: 130 MMHG

## 2019-08-26 DIAGNOSIS — R03.0 ELEVATED BLOOD-PRESSURE READING, W/OUT DIAGNOSIS OF HYPERTENSION: ICD-10-CM

## 2019-08-26 PROCEDURE — 99214 OFFICE O/P EST MOD 30 MIN: CPT

## 2019-08-26 NOTE — HISTORY OF PRESENT ILLNESS
[FreeTextEntry8] : CC lump- eye\par pt dev Lump R upper eyelid..- started Wed- now worse.mild constant dull  pain over the area of the sty( worse w palpation)- both on upper eyelid, and lower eyelid.  using cool compress over the eye- it helps. \par pt doesn't wear contacts or eye make up.  mild blurry vision in the R eye. \par no eye discharge.  crusting of R eye in the AM - since the weekend.\par no fever\par \par elev BP- non compliant w diet\par \par pt had new breast ca in the same breast- pt had lumpectomy and introoperative XRT

## 2019-08-26 NOTE — PHYSICAL EXAM
[Normal Sclera/Conjunctiva] : normal sclera/conjunctiva [PERRL] : pupils equal round and reactive to light [EOMI] : extraocular movements intact [de-identified] : large sty R upper eyelid.  small sty- lower eyelid

## 2019-08-26 NOTE — HEALTH RISK ASSESSMENT
[Yes] : Yes [4 or more  times a week (4 pts)] : 4 or more  times a week (4 points) [1 or 2 (0 pts)] : 1 or 2 (0 points) [Never (0 pts)] : Never (0 points) [Audit-CScore] : 4

## 2019-08-26 NOTE — PLAN
[FreeTextEntry1] : elev BP- stop alcohol, low salt diet\par sty- warm compress, massage- TID.  if not improving will need to f/u w ophtho

## 2019-09-02 PROBLEM — Z09 FOLLOW UP: Status: ACTIVE | Noted: 2017-11-16

## 2019-09-03 ENCOUNTER — CLINICAL ADVICE (OUTPATIENT)
Age: 64
End: 2019-09-03

## 2019-09-16 ENCOUNTER — RX RENEWAL (OUTPATIENT)
Age: 64
End: 2019-09-16

## 2019-09-30 ENCOUNTER — APPOINTMENT (OUTPATIENT)
Dept: INTERNAL MEDICINE | Facility: CLINIC | Age: 64
End: 2019-09-30
Payer: COMMERCIAL

## 2019-09-30 VITALS
DIASTOLIC BLOOD PRESSURE: 98 MMHG | SYSTOLIC BLOOD PRESSURE: 138 MMHG | BODY MASS INDEX: 24.18 KG/M2 | WEIGHT: 159 LBS | TEMPERATURE: 97.1 F

## 2019-09-30 DIAGNOSIS — R00.2 PALPITATIONS: ICD-10-CM

## 2019-09-30 PROCEDURE — 99214 OFFICE O/P EST MOD 30 MIN: CPT

## 2019-09-30 RX ORDER — MESALAMINE 1.2 G/1
1.2 TABLET, DELAYED RELEASE ORAL DAILY
Qty: 60 | Refills: 5 | Status: COMPLETED | COMMUNITY
Start: 2019-06-12 | End: 2019-09-30

## 2019-09-30 RX ORDER — ANASTROZOLE TABLETS 1 MG/1
1 TABLET ORAL
Refills: 0 | Status: ACTIVE | COMMUNITY

## 2019-10-01 NOTE — PHYSICAL EXAM
[No Acute Distress] : no acute distress [Well Nourished] : well nourished [Well Developed] : well developed [Well-Appearing] : well-appearing [Normal Sclera/Conjunctiva] : normal sclera/conjunctiva [PERRL] : pupils equal round and reactive to light [EOMI] : extraocular movements intact [Normal Outer Ear/Nose] : the outer ears and nose were normal in appearance [Normal Oropharynx] : the oropharynx was normal [No JVD] : no jugular venous distention [No Lymphadenopathy] : no lymphadenopathy [Supple] : supple [Thyroid Normal, No Nodules] : the thyroid was normal and there were no nodules present [No Respiratory Distress] : no respiratory distress  [No Accessory Muscle Use] : no accessory muscle use [Clear to Auscultation] : lungs were clear to auscultation bilaterally [Normal Rate] : normal rate  [Regular Rhythm] : with a regular rhythm [Normal S1, S2] : normal S1 and S2 [No Murmur] : no murmur heard [No Carotid Bruits] : no carotid bruits [No Abdominal Bruit] : a ~M bruit was not heard ~T in the abdomen [No Varicosities] : no varicosities [Pedal Pulses Present] : the pedal pulses are present [No Edema] : there was no peripheral edema [No Extremity Clubbing/Cyanosis] : no extremity clubbing/cyanosis [No Palpable Aorta] : no palpable aorta [Soft] : abdomen soft [Non Tender] : non-tender [Non-distended] : non-distended [No Masses] : no abdominal mass palpated [No HSM] : no HSM [Normal Posterior Cervical Nodes] : no posterior cervical lymphadenopathy [Normal Bowel Sounds] : normal bowel sounds [Normal Anterior Cervical Nodes] : no anterior cervical lymphadenopathy [No CVA Tenderness] : no CVA  tenderness [No Spinal Tenderness] : no spinal tenderness [No Joint Swelling] : no joint swelling [Grossly Normal Strength/Tone] : grossly normal strength/tone [No Rash] : no rash [Coordination Grossly Intact] : coordination grossly intact [Normal Gait] : normal gait [No Focal Deficits] : no focal deficits [Normal Affect] : the affect was normal [Deep Tendon Reflexes (DTR)] : deep tendon reflexes were 2+ and symmetric [Normal Insight/Judgement] : insight and judgment were intact

## 2019-10-01 NOTE — HISTORY OF PRESENT ILLNESS
[FreeTextEntry1] : Patient presents for follow up evaluation for multiple medical concerns including:\par recent increased BP readings at specialists' visits. [de-identified] : She notes BP readings have been 140-150/90s with her specialists recently.\par One time BP was 170's /.\par She denies chest pains or edema but does note headaches and occasional palpitations when stressed and BP readings were elevated.\par \par she does admit to a stressful past year dealing with her diagnosis of breast cancer-she recently underwent lumpectomy and intraoperative XRT and is now maintained on Anastrozole qd.

## 2019-10-01 NOTE — REVIEW OF SYSTEMS
[Fatigue] : fatigue [Palpitations] : palpitations [Headache] : headache [Negative] : Heme/Lymph [Chest Pain] : no chest pain

## 2019-10-01 NOTE — PHYSICAL EXAM
[No Acute Distress] : no acute distress [Well Nourished] : well nourished [Well Developed] : well developed [Well-Appearing] : well-appearing [Normal Sclera/Conjunctiva] : normal sclera/conjunctiva [PERRL] : pupils equal round and reactive to light [EOMI] : extraocular movements intact [Normal Outer Ear/Nose] : the outer ears and nose were normal in appearance [Normal Oropharynx] : the oropharynx was normal [No JVD] : no jugular venous distention [No Lymphadenopathy] : no lymphadenopathy [Supple] : supple [Thyroid Normal, No Nodules] : the thyroid was normal and there were no nodules present [No Respiratory Distress] : no respiratory distress  [No Accessory Muscle Use] : no accessory muscle use [Clear to Auscultation] : lungs were clear to auscultation bilaterally [Normal Rate] : normal rate  [Regular Rhythm] : with a regular rhythm [Normal S1, S2] : normal S1 and S2 [No Murmur] : no murmur heard [No Carotid Bruits] : no carotid bruits [No Abdominal Bruit] : a ~M bruit was not heard ~T in the abdomen [No Varicosities] : no varicosities [Pedal Pulses Present] : the pedal pulses are present [No Edema] : there was no peripheral edema [No Extremity Clubbing/Cyanosis] : no extremity clubbing/cyanosis [No Palpable Aorta] : no palpable aorta [Soft] : abdomen soft [Non Tender] : non-tender [Non-distended] : non-distended [No Masses] : no abdominal mass palpated [No HSM] : no HSM [Normal Posterior Cervical Nodes] : no posterior cervical lymphadenopathy [Normal Bowel Sounds] : normal bowel sounds [Normal Anterior Cervical Nodes] : no anterior cervical lymphadenopathy [No Spinal Tenderness] : no spinal tenderness [No CVA Tenderness] : no CVA  tenderness [No Joint Swelling] : no joint swelling [Grossly Normal Strength/Tone] : grossly normal strength/tone [No Rash] : no rash [Coordination Grossly Intact] : coordination grossly intact [No Focal Deficits] : no focal deficits [Normal Gait] : normal gait [Deep Tendon Reflexes (DTR)] : deep tendon reflexes were 2+ and symmetric [Normal Affect] : the affect was normal [Normal Insight/Judgement] : insight and judgment were intact

## 2019-10-01 NOTE — PLAN
[FreeTextEntry1] : Pt declined medication treatment for her elevated BP at this time.  She prefers to try lifestyle modification first.\par I recommend weight loss, low salt diet and increased exercise.\par \par I also recommend home BP monitoring and contacting me if BP should remain persistently high in the next 2 weeks.

## 2019-10-01 NOTE — HISTORY OF PRESENT ILLNESS
[FreeTextEntry1] : Patient presents for follow up evaluation for multiple medical concerns including:\par recent increased BP readings at specialists' visits. [de-identified] : She notes BP readings have been 140-150/90s with her specialists recently.\par One time BP was 170's /.\par She denies chest pains or edema but does note headaches and occasional palpitations when stressed and BP readings were elevated.\par \par she does admit to a stressful past year dealing with her diagnosis of breast cancer-she recently underwent lumpectomy and intraoperative XRT and is now maintained on Anastrozole qd.

## 2020-01-13 ENCOUNTER — APPOINTMENT (OUTPATIENT)
Dept: INTERNAL MEDICINE | Facility: CLINIC | Age: 65
End: 2020-01-13
Payer: MEDICARE

## 2020-01-13 VITALS
BODY MASS INDEX: 23.19 KG/M2 | HEIGHT: 68 IN | DIASTOLIC BLOOD PRESSURE: 92 MMHG | WEIGHT: 153 LBS | SYSTOLIC BLOOD PRESSURE: 132 MMHG

## 2020-01-13 VITALS
TEMPERATURE: 98.1 F | WEIGHT: 152 LBS | SYSTOLIC BLOOD PRESSURE: 132 MMHG | BODY MASS INDEX: 23.04 KG/M2 | HEIGHT: 68 IN | DIASTOLIC BLOOD PRESSURE: 92 MMHG

## 2020-01-13 DIAGNOSIS — G47.00 INSOMNIA, UNSPECIFIED: ICD-10-CM

## 2020-01-13 PROCEDURE — 99214 OFFICE O/P EST MOD 30 MIN: CPT

## 2020-01-13 PROCEDURE — 93306 TTE W/DOPPLER COMPLETE: CPT | Mod: 26

## 2020-01-13 PROCEDURE — 93306 TTE W/DOPPLER COMPLETE: CPT | Mod: TC

## 2020-01-14 ENCOUNTER — APPOINTMENT (OUTPATIENT)
Dept: GASTROENTEROLOGY | Facility: CLINIC | Age: 65
End: 2020-01-14
Payer: MEDICARE

## 2020-01-14 VITALS
DIASTOLIC BLOOD PRESSURE: 90 MMHG | OXYGEN SATURATION: 95 % | TEMPERATURE: 97.1 F | HEIGHT: 68 IN | SYSTOLIC BLOOD PRESSURE: 128 MMHG | HEART RATE: 79 BPM | BODY MASS INDEX: 23.04 KG/M2 | WEIGHT: 152 LBS

## 2020-01-14 PROCEDURE — 99214 OFFICE O/P EST MOD 30 MIN: CPT

## 2020-01-14 NOTE — HISTORY OF PRESENT ILLNESS
[FreeTextEntry1] : \par I had seen this patient in June 2019 with rectal bleeding; colonoscopy revealed likely ulcerative colitis\par \par Since - taking Lialda daily - helped with the bleeding - but still had mucous and urgency\par \par Has been taking the medication off and on\par \par Over the last few weeks taking the medication regularly - no bleeding, but still with mucous and urgency\par Also - last week had two episodes with painful BMs - strained - stool came out formed but odd shaped - like an "S" and the other like an "L"\par may have to return to bathroom 4 or 5 times; may have lower abd cramps\par No diarrhea\par \par

## 2020-01-14 NOTE — PHYSICAL EXAM
[General Appearance - Alert] : alert [General Appearance - In No Acute Distress] : in no acute distress [Neck Cervical Mass (___cm)] : no neck mass was observed [Neck Appearance] : the appearance of the neck was normal [Jugular Venous Distention Increased] : there was no jugular-venous distention [Thyroid Diffuse Enlargement] : the thyroid was not enlarged [Thyroid Nodule] : there were no palpable thyroid nodules [Auscultation Breath Sounds / Voice Sounds] : lungs were clear to auscultation bilaterally [Heart Rate And Rhythm] : heart rate was normal and rhythm regular [Heart Sounds Gallop] : no gallops [Heart Sounds] : normal S1 and S2 [Murmurs] : no murmurs [Heart Sounds Pericardial Friction Rub] : no pericardial rub [Bowel Sounds] : normal bowel sounds [Abdomen Soft] : soft [] : no hepato-splenomegaly [Abdomen Tenderness] : non-tender [Abdomen Mass (___ Cm)] : no abdominal mass palpated [Abnormal Walk] : normal gait [Nail Clubbing] : no clubbing  or cyanosis of the fingernails [Motor Tone] : muscle strength and tone were normal [Musculoskeletal - Swelling] : no joint swelling seen

## 2020-01-14 NOTE — ASSESSMENT
[FreeTextEntry1] : \par UC\par \par Switch to Apriso due to size of tablets\par Add Rowasa enemas nightly\par \par f/u 6 weeks

## 2020-01-14 NOTE — HISTORY OF PRESENT ILLNESS
[FreeTextEntry1] : Patient presents for follow up evaluation for multiple medical concerns including:new hypertension\par  [de-identified] : Patient has been in good overall health this past year.\par \par BP has been variable since her last visit.  She will be traveling with her sister and niece to [place] 3[place]20.

## 2020-01-18 ENCOUNTER — MOBILE ON CALL (OUTPATIENT)
Age: 65
End: 2020-01-18

## 2020-03-03 ENCOUNTER — APPOINTMENT (OUTPATIENT)
Dept: GASTROENTEROLOGY | Facility: CLINIC | Age: 65
End: 2020-03-03
Payer: MEDICARE

## 2020-03-03 VITALS
TEMPERATURE: 98 F | DIASTOLIC BLOOD PRESSURE: 90 MMHG | SYSTOLIC BLOOD PRESSURE: 140 MMHG | HEART RATE: 81 BPM | HEIGHT: 68 IN | BODY MASS INDEX: 22.73 KG/M2 | WEIGHT: 150 LBS | OXYGEN SATURATION: 98 %

## 2020-03-03 PROCEDURE — 99213 OFFICE O/P EST LOW 20 MIN: CPT

## 2020-04-20 ENCOUNTER — APPOINTMENT (OUTPATIENT)
Dept: INTERNAL MEDICINE | Facility: CLINIC | Age: 65
End: 2020-04-20

## 2020-06-18 ENCOUNTER — RX RENEWAL (OUTPATIENT)
Age: 65
End: 2020-06-18

## 2020-08-17 ENCOUNTER — NON-APPOINTMENT (OUTPATIENT)
Age: 65
End: 2020-08-17

## 2020-08-17 ENCOUNTER — RX RENEWAL (OUTPATIENT)
Age: 65
End: 2020-08-17

## 2020-09-08 ENCOUNTER — APPOINTMENT (OUTPATIENT)
Dept: GASTROENTEROLOGY | Facility: CLINIC | Age: 65
End: 2020-09-08
Payer: MEDICARE

## 2020-09-08 VITALS
HEIGHT: 68 IN | SYSTOLIC BLOOD PRESSURE: 180 MMHG | BODY MASS INDEX: 21.37 KG/M2 | HEART RATE: 82 BPM | WEIGHT: 141 LBS | OXYGEN SATURATION: 99 % | DIASTOLIC BLOOD PRESSURE: 80 MMHG | TEMPERATURE: 97.5 F

## 2020-09-08 PROCEDURE — 99214 OFFICE O/P EST MOD 30 MIN: CPT

## 2020-09-08 NOTE — ASSESSMENT
[FreeTextEntry1] : \par UC - symptomatically in remission\par \par continue present regimen\par weight loss ?related to less EtOH\par \par colonoscopy\par \par

## 2020-09-08 NOTE — REVIEW OF SYSTEMS
[As Noted in HPI] : as noted in HPI [Negative] : Respiratory [Chills] : no chills [Fever] : no fever

## 2020-09-08 NOTE — HISTORY OF PRESENT ILLNESS
[FreeTextEntry1] : \par Doing well!\par taking 2 Lialda daily\par \par Had lost her sister suddenly in April - ?asthma or MI (non-Covid)\par Generally daily, formed and non-bloody\par \par No cramps or pain\par \par Losing weight\par \par feels more stress - Valium once a day\par \par

## 2020-09-08 NOTE — PHYSICAL EXAM
[General Appearance - Alert] : alert [General Appearance - In No Acute Distress] : in no acute distress [Neck Appearance] : the appearance of the neck was normal [Neck Cervical Mass (___cm)] : no neck mass was observed [Jugular Venous Distention Increased] : there was no jugular-venous distention [Thyroid Nodule] : there were no palpable thyroid nodules [Thyroid Diffuse Enlargement] : the thyroid was not enlarged [Auscultation Breath Sounds / Voice Sounds] : lungs were clear to auscultation bilaterally [Bowel Sounds] : normal bowel sounds [Abdomen Soft] : soft [Abdomen Tenderness] : non-tender [] : no hepato-splenomegaly [Abdomen Mass (___ Cm)] : no abdominal mass palpated

## 2020-11-08 ENCOUNTER — APPOINTMENT (OUTPATIENT)
Dept: DISASTER EMERGENCY | Facility: CLINIC | Age: 65
End: 2020-11-08

## 2020-11-08 DIAGNOSIS — Z01.818 ENCOUNTER FOR OTHER PREPROCEDURAL EXAMINATION: ICD-10-CM

## 2020-11-11 ENCOUNTER — APPOINTMENT (OUTPATIENT)
Dept: GASTROENTEROLOGY | Facility: CLINIC | Age: 65
End: 2020-11-11
Payer: MEDICARE

## 2020-11-11 ENCOUNTER — LABORATORY RESULT (OUTPATIENT)
Age: 65
End: 2020-11-11

## 2020-11-11 PROCEDURE — 45378 DIAGNOSTIC COLONOSCOPY: CPT

## 2020-11-25 ENCOUNTER — RX RENEWAL (OUTPATIENT)
Age: 65
End: 2020-11-25

## 2020-12-10 ENCOUNTER — LABORATORY RESULT (OUTPATIENT)
Age: 65
End: 2020-12-10

## 2020-12-11 ENCOUNTER — APPOINTMENT (OUTPATIENT)
Dept: INTERNAL MEDICINE | Facility: CLINIC | Age: 65
End: 2020-12-11
Payer: MEDICARE

## 2020-12-11 ENCOUNTER — LABORATORY RESULT (OUTPATIENT)
Age: 65
End: 2020-12-11

## 2020-12-11 PROCEDURE — 36415 COLL VENOUS BLD VENIPUNCTURE: CPT

## 2020-12-12 ENCOUNTER — LABORATORY RESULT (OUTPATIENT)
Age: 65
End: 2020-12-12

## 2020-12-13 LAB
25(OH)D3 SERPL-MCNC: 62.1 NG/ML
ALBUMIN SERPL ELPH-MCNC: 4.6 G/DL
ALP BLD-CCNC: 93 U/L
ALT SERPL-CCNC: 13 U/L
ANION GAP SERPL CALC-SCNC: 11 MMOL/L
APPEARANCE: ABNORMAL
AST SERPL-CCNC: 22 U/L
BASOPHILS # BLD AUTO: 0.02 K/UL
BASOPHILS NFR BLD AUTO: 0.3 %
BILIRUB SERPL-MCNC: 1.3 MG/DL
BILIRUBIN URINE: NEGATIVE
BLOOD URINE: NEGATIVE
BUN SERPL-MCNC: 20 MG/DL
CALCIUM SERPL-MCNC: 10.2 MG/DL
CHLORIDE SERPL-SCNC: 100 MMOL/L
CHOLEST SERPL-MCNC: 193 MG/DL
CO2 SERPL-SCNC: 27 MMOL/L
COLOR: YELLOW
CREAT SERPL-MCNC: 1.12 MG/DL
EOSINOPHIL # BLD AUTO: 0.08 K/UL
EOSINOPHIL NFR BLD AUTO: 1.4 %
ESTIMATED AVERAGE GLUCOSE: 105 MG/DL
GLUCOSE QUALITATIVE U: NEGATIVE
GLUCOSE SERPL-MCNC: 98 MG/DL
HBA1C MFR BLD HPLC: 5.3 %
HCT VFR BLD CALC: 49 %
HDLC SERPL-MCNC: 81 MG/DL
HGB BLD-MCNC: 15.8 G/DL
IMM GRANULOCYTES NFR BLD AUTO: 0.2 %
KETONES URINE: NEGATIVE
LDLC SERPL CALC-MCNC: 100 MG/DL
LDLC SERPL DIRECT ASSAY-MCNC: 103 MG/DL
LEUKOCYTE ESTERASE URINE: ABNORMAL
LYMPHOCYTES # BLD AUTO: 1.36 K/UL
LYMPHOCYTES NFR BLD AUTO: 23.7 %
MAN DIFF?: NORMAL
MCHC RBC-ENTMCNC: 32.1 PG
MCHC RBC-ENTMCNC: 32.2 GM/DL
MCV RBC AUTO: 99.6 FL
MONOCYTES # BLD AUTO: 0.45 K/UL
MONOCYTES NFR BLD AUTO: 7.8 %
NEUTROPHILS # BLD AUTO: 3.83 K/UL
NEUTROPHILS NFR BLD AUTO: 66.6 %
NITRITE URINE: POSITIVE
NONHDLC SERPL-MCNC: 112 MG/DL
PH URINE: 6
PLATELET # BLD AUTO: 313 K/UL
POTASSIUM SERPL-SCNC: 4.3 MMOL/L
PROT SERPL-MCNC: 8.4 G/DL
PROTEIN URINE: NORMAL
RBC # BLD: 4.92 M/UL
RBC # FLD: 14.6 %
SARS-COV-2 IGG SERPL IA-ACNC: <0.1 INDEX
SARS-COV-2 IGG SERPL QL IA: NEGATIVE
SAVE SPECIMEN: NORMAL
SODIUM SERPL-SCNC: 138 MMOL/L
SPECIFIC GRAVITY URINE: 1.02
T3 SERPL-MCNC: 81 NG/DL
T3RU NFR SERPL: 0.5 TBI
T4 FREE SERPL-MCNC: 1 NG/DL
TRIGL SERPL-MCNC: 58 MG/DL
TSH SERPL-ACNC: 1.94 UIU/ML
UROBILINOGEN URINE: NORMAL
WBC # FLD AUTO: 5.75 K/UL

## 2020-12-17 ENCOUNTER — APPOINTMENT (OUTPATIENT)
Dept: INTERNAL MEDICINE | Facility: CLINIC | Age: 65
End: 2020-12-17
Payer: MEDICARE

## 2020-12-17 ENCOUNTER — NON-APPOINTMENT (OUTPATIENT)
Age: 65
End: 2020-12-17

## 2020-12-17 VITALS
SYSTOLIC BLOOD PRESSURE: 138 MMHG | DIASTOLIC BLOOD PRESSURE: 78 MMHG | TEMPERATURE: 96 F | WEIGHT: 136 LBS | HEIGHT: 68 IN | BODY MASS INDEX: 20.61 KG/M2

## 2020-12-17 DIAGNOSIS — E04.0 NONTOXIC DIFFUSE GOITER: ICD-10-CM

## 2020-12-17 DIAGNOSIS — R92.2 INCONCLUSIVE MAMMOGRAM: ICD-10-CM

## 2020-12-17 DIAGNOSIS — M85.89 OTHER SPECIFIED DISORDERS OF BONE DENSITY AND STRUCTURE, MULTIPLE SITES: ICD-10-CM

## 2020-12-17 DIAGNOSIS — D05.10 INTRADUCTAL CARCINOMA IN SITU OF UNSPECIFIED BREAST: ICD-10-CM

## 2020-12-17 DIAGNOSIS — Z23 ENCOUNTER FOR IMMUNIZATION: ICD-10-CM

## 2020-12-17 PROCEDURE — G0402 INITIAL PREVENTIVE EXAM: CPT

## 2020-12-17 PROCEDURE — 90670 PCV13 VACCINE IM: CPT

## 2020-12-17 PROCEDURE — G0403: CPT

## 2020-12-17 PROCEDURE — 99215 OFFICE O/P EST HI 40 MIN: CPT | Mod: 25

## 2020-12-17 PROCEDURE — G0009: CPT

## 2020-12-17 RX ORDER — AMLODIPINE BESYLATE 5 MG/1
5 TABLET ORAL DAILY
Qty: 90 | Refills: 3 | Status: COMPLETED | COMMUNITY
Start: 2020-01-18 | End: 2020-12-17

## 2020-12-17 NOTE — HEALTH RISK ASSESSMENT
[Good] : ~his/her~  mood as  good [Patient reported mammogram was abnormal] : Patient reported mammogram was abnormal [HIV test declined] : HIV test declined [Hepatitis C test declined] : Hepatitis C test declined [30 or more] : 30 or more [Yes] : Yes [4 or more  times a week (4 pts)] : 4 or more  times a week (4 points) [1 or 2 (0 pts)] : 1 or 2 (0 points) [Never (0 pts)] : Never (0 points) [No] : In the past 12 months have you used drugs other than those required for medical reasons? No [No falls in past year] : Patient reported no falls in the past year [0] : 2) Feeling down, depressed, or hopeless: Not at all (0) [Patient reported colonoscopy was abnormal] : Patient reported colonoscopy was abnormal [None] : None [With Significant Other] : lives with significant other [# of Members in Household ___] :  household currently consist of [unfilled] member(s) [Retired] : retired [College] : College [] :  [# Of Children ___] : has [unfilled] children [Sexually Active] : sexually active [Feels Safe at Home] : Feels safe at home [Fully functional (bathing, dressing, toileting, transferring, walking, feeding)] : Fully functional (bathing, dressing, toileting, transferring, walking, feeding) [Fully functional (using the telephone, shopping, preparing meals, housekeeping, doing laundry, using] : Fully functional and needs no help or supervision to perform IADLs (using the telephone, shopping, preparing meals, housekeeping, doing laundry, using transportation, managing medications and managing finances) [Reports normal functional visual acuity (ie: able to read med bottle)] : Reports normal functional visual acuity [Smoke Detector] : smoke detector [Seat Belt] :  uses seat belt [Sunscreen] : uses sunscreen [With Patient/Caregiver] : With Patient/Caregiver [] : No [de-identified] : GI- Dr. Ely, Oncologist Dr. Mavis Wharton [de-identified] : currently smokes 1/2-1ppd (has been smoking since age 15) [de-identified] : drinks1-2 rum cocktails every night [Audit-CScore] : 4 [de-identified] : walks daily-prior to the start of the COVID 19 pandemic -went to the gym 2-3 times a week [de-identified] : overall healthy balanced and varied diet without any exclusions  [[id number]] : 0 [Change in mental status noted] : No change in mental status noted [Language] : denies difficulty with language [Behavior] : denies difficulty with behavior [Learning/Retaining New Information] : denies difficulty learning/retaining new information [Handling Complex Tasks] : denies difficulty handling complex tasks [Reasoning] : denies difficulty with reasoning [Spatial Ability and Orientation] : denies difficulty with spatial ability and orientation [High Risk Behavior] : no high risk behavior [Reports changes in hearing] : Reports no changes in hearing [Reports changes in vision] : Reports no changes in vision [Reports changes in dental health] : Reports no changes in dental health [Guns at Home] : no guns at home [Travel to Developing Areas] : does not  travel to developing areas [TB Exposure] : is not being exposed to tuberculosis [Caregiver Concerns] : does not have caregiver concerns [MammogramDate] : 11/19 [MammogramComments] : was diagnosed with breast cancer-will f/u with Dr. Mayfield 9/2019 [PapSmearDate] : 11/19 [PapSmearComments] : with Dr. Clark [BoneDensityDate] : 11/18 [BoneDensityComments] : T scores were -2.1 and -1.6 [ColonoscopyDate] : 11/20 [ColonoscopyComments] : with MOH-confirmed Ulcerative Colitis [AdvancecareDate] : 12/20 [FreeTextEntry4] : Patient does not have a designated health care proxy.  Patient will discuss with family.  Information and health care proxy forms were given to the pt.\par

## 2020-12-17 NOTE — REVIEW OF SYSTEMS
[Negative] : Heme/Lymph [Recent Change In Weight] : ~T recent weight change [Diarrhea] : diarrhea [FreeTextEntry2] : weight loss of 16 lbs since last  year

## 2020-12-17 NOTE — HISTORY OF PRESENT ILLNESS
[FreeTextEntry1] : Pt. presents for a comprehensive evaluation for multiple medical issues.\par \par  [de-identified] : She had a very difficult past year.  Her older sister who was 70 yo and lived in [place]  suddenly in 2020 without any warning.  She may have  from a severe asthma attack. Patient is mourning her loss.\par \par Pt was diagnosed with invasive ductal carcinoma of the left breast- underwent  left lumpectomy 2019 with Dr. Vivek Mayfield at [facility] and had intra-operative localized XRT. 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 She had f/u with Dr. Mayfield in ~2019.\par \par She is doing well since the surgery and is now on Anastrazole 1mg qd.\par She had f/u with oncologist, Dr. Mavis Wharton in [facility] last month (she sees her q ~6months) .\par \par Last year, her BP was noted to be labile with multiple high readings. Stress level has gone down and she lost weight and reduced salt intake and BP has been in normal range.  She decided not to take the amlodipine and wants to continue to monitor the BP. She denies any chest pressure or palpitations of headaches. Echo revealed mild/ grade1  LVH and moderate MR.\par \par \par Early this year-she complained of daily watery diarrheal symptoms-2-3 times a day with copious mucous at times and occasional blood from internal hemorrhoid for 2-3 months.  She saw Dr. HURD and colonoscopy confirmed dx of ulcerative colitis. \par She is doing much better on Mesalamine qd.

## 2020-12-17 NOTE — PHYSICAL EXAM
[No Acute Distress] : no acute distress [Well Nourished] : well nourished [Well Developed] : well developed [Well-Appearing] : well-appearing [Normal Sclera/Conjunctiva] : normal sclera/conjunctiva [PERRL] : pupils equal round and reactive to light [EOMI] : extraocular movements intact [Normal Outer Ear/Nose] : the outer ears and nose were normal in appearance [No JVD] : no jugular venous distention [Supple] : supple [No Lymphadenopathy] : no lymphadenopathy [No Respiratory Distress] : no respiratory distress  [Clear to Auscultation] : lungs were clear to auscultation bilaterally [No Accessory Muscle Use] : no accessory muscle use [Normal Rate] : normal rate  [Regular Rhythm] : with a regular rhythm [Normal S1, S2] : normal S1 and S2 [No Murmur] : no murmur heard [No Carotid Bruits] : no carotid bruits [No Abdominal Bruit] : a ~M bruit was not heard ~T in the abdomen [No Varicosities] : no varicosities [Pedal Pulses Present] : the pedal pulses are present [No Edema] : there was no peripheral edema [No Extremity Clubbing/Cyanosis] : no extremity clubbing/cyanosis [No Palpable Aorta] : no palpable aorta [Soft] : abdomen soft [Non Tender] : non-tender [Non-distended] : non-distended [No Masses] : no abdominal mass palpated [No HSM] : no HSM [Normal Bowel Sounds] : normal bowel sounds [Normal Posterior Cervical Nodes] : no posterior cervical lymphadenopathy [Normal Anterior Cervical Nodes] : no anterior cervical lymphadenopathy [No CVA Tenderness] : no CVA  tenderness [No Spinal Tenderness] : no spinal tenderness [No Joint Swelling] : no joint swelling [Grossly Normal Strength/Tone] : grossly normal strength/tone [No Rash] : no rash [Normal Gait] : normal gait [Coordination Grossly Intact] : coordination grossly intact [No Focal Deficits] : no focal deficits [Deep Tendon Reflexes (DTR)] : deep tendon reflexes were 2+ and symmetric [Normal Affect] : the affect was normal [Normal Insight/Judgement] : insight and judgment were intact [de-identified] : large goiter with multiple nodules bilaterally [de-identified] : s/p old left lumpectomy scar (from 19 years ago at 7 O'clock position) and new lumpectomy scar along left outer breast- with localized post radiation changes and induration/ s./p left axillary LN dissection scar-c/d/i

## 2020-12-17 NOTE — ASSESSMENT
[FreeTextEntry1] : I discussed Shingrix vaccine with pt.\par She had Zostavax in 2016.  She will hold off on the Shingrix vaccine for now-may consider it in the spring of 2021.\par \par She received Prevnar 13 today.\par She declines flu shot.\par \par

## 2020-12-26 ENCOUNTER — NON-APPOINTMENT (OUTPATIENT)
Age: 65
End: 2020-12-26

## 2020-12-26 VITALS — WEIGHT: 136 LBS | HEIGHT: 68 IN | BODY MASS INDEX: 20.61 KG/M2

## 2020-12-26 DIAGNOSIS — Z12.2 ENCOUNTER FOR SCREENING FOR MALIGNANT NEOPLASM OF RESPIRATORY ORGANS: ICD-10-CM

## 2020-12-26 NOTE — ASSESSMENT
[Discussed Risks and Advised to Quit Smoking] : Discussed risks and advised to quit smoking [Discussed Cessation Strategies] : cessation strategies were discussed [Declined] : Patient has declined cessation intervention [Contemplation] : Contemplation: The patient is considering quitting smoking

## 2020-12-28 ENCOUNTER — APPOINTMENT (OUTPATIENT)
Dept: ULTRASOUND IMAGING | Facility: CLINIC | Age: 65
End: 2020-12-28

## 2020-12-28 ENCOUNTER — APPOINTMENT (OUTPATIENT)
Dept: CT IMAGING | Facility: CLINIC | Age: 65
End: 2020-12-28
Payer: MEDICARE

## 2020-12-28 ENCOUNTER — RESULT REVIEW (OUTPATIENT)
Age: 65
End: 2020-12-28

## 2020-12-28 ENCOUNTER — OUTPATIENT (OUTPATIENT)
Dept: OUTPATIENT SERVICES | Facility: HOSPITAL | Age: 65
LOS: 1 days | End: 2020-12-28
Payer: MEDICARE

## 2020-12-28 DIAGNOSIS — R92.8 OTHER ABNORMAL AND INCONCLUSIVE FINDINGS ON DIAGNOSTIC IMAGING OF BREAST: ICD-10-CM

## 2020-12-28 DIAGNOSIS — Z98.890 OTHER SPECIFIED POSTPROCEDURAL STATES: Chronic | ICD-10-CM

## 2020-12-28 DIAGNOSIS — R92.2 INCONCLUSIVE MAMMOGRAM: ICD-10-CM

## 2020-12-28 PROCEDURE — 76536 US EXAM OF HEAD AND NECK: CPT

## 2020-12-28 PROCEDURE — 71271 CT THORAX LUNG CANCER SCR C-: CPT

## 2020-12-28 PROCEDURE — 76536 US EXAM OF HEAD AND NECK: CPT | Mod: 26

## 2020-12-28 PROCEDURE — G0297: CPT | Mod: 26

## 2021-01-06 NOTE — ED ADULT TRIAGE NOTE - HEIGHT IN INCHES
Sue Silverman is a 17 year old female presenting with painful and frequent urination.    TE dated 1/6/2021:  Mom called stating that Sue has been having pain with urination, and also increase in urination. The symptoms started 1/5/2021.. The patient is on her menses so she is unsure if there is blood in her urine.      Denies known Latex allergy or symptoms of Latex sensitivity.    Health Maintenance Due   Topic Date Due   • Influenza Vaccine (1) 09/01/2020   • Meningococcal Serogroup B Vaccine (2 of 2 - Risk Bexsero 2-dose series) 09/02/2020   • HPV Vaccine (3 - 3-dose series) 10/25/2020       Patient would like communication of their results via:        Cell Phone:   [phone] mothers mobile.  OK to leave message.        8

## 2021-01-31 LAB — SARS-COV-2 N GENE NPH QL NAA+PROBE: NOT DETECTED

## 2021-02-13 ENCOUNTER — NON-APPOINTMENT (OUTPATIENT)
Age: 66
End: 2021-02-13

## 2021-02-15 ENCOUNTER — RX RENEWAL (OUTPATIENT)
Age: 66
End: 2021-02-15

## 2021-03-23 ENCOUNTER — APPOINTMENT (OUTPATIENT)
Dept: ULTRASOUND IMAGING | Facility: IMAGING CENTER | Age: 66
End: 2021-03-23
Payer: MEDICARE

## 2021-03-23 ENCOUNTER — RESULT REVIEW (OUTPATIENT)
Age: 66
End: 2021-03-23

## 2021-03-23 ENCOUNTER — APPOINTMENT (OUTPATIENT)
Dept: MAMMOGRAPHY | Facility: IMAGING CENTER | Age: 66
End: 2021-03-23
Payer: MEDICARE

## 2021-03-23 ENCOUNTER — APPOINTMENT (OUTPATIENT)
Dept: RADIOLOGY | Facility: IMAGING CENTER | Age: 66
End: 2021-03-23
Payer: MEDICARE

## 2021-03-23 ENCOUNTER — OUTPATIENT (OUTPATIENT)
Dept: OUTPATIENT SERVICES | Facility: HOSPITAL | Age: 66
LOS: 1 days | End: 2021-03-23
Payer: MEDICARE

## 2021-03-23 DIAGNOSIS — Z00.8 ENCOUNTER FOR OTHER GENERAL EXAMINATION: ICD-10-CM

## 2021-03-23 DIAGNOSIS — Z98.890 OTHER SPECIFIED POSTPROCEDURAL STATES: Chronic | ICD-10-CM

## 2021-03-23 PROCEDURE — 76641 ULTRASOUND BREAST COMPLETE: CPT

## 2021-03-23 PROCEDURE — 77066 DX MAMMO INCL CAD BI: CPT

## 2021-03-23 PROCEDURE — G0279: CPT | Mod: 26

## 2021-03-23 PROCEDURE — 76641 ULTRASOUND BREAST COMPLETE: CPT | Mod: 26,50

## 2021-03-23 PROCEDURE — 77066 DX MAMMO INCL CAD BI: CPT | Mod: 26

## 2021-03-23 PROCEDURE — 77080 DXA BONE DENSITY AXIAL: CPT | Mod: 26

## 2021-03-23 PROCEDURE — G0279: CPT

## 2021-03-23 PROCEDURE — 77080 DXA BONE DENSITY AXIAL: CPT

## 2021-03-25 ENCOUNTER — NON-APPOINTMENT (OUTPATIENT)
Age: 66
End: 2021-03-25

## 2021-06-11 ENCOUNTER — APPOINTMENT (OUTPATIENT)
Dept: INTERNAL MEDICINE | Facility: CLINIC | Age: 66
End: 2021-06-11
Payer: MEDICARE

## 2021-06-11 ENCOUNTER — NON-APPOINTMENT (OUTPATIENT)
Age: 66
End: 2021-06-11

## 2021-06-11 DIAGNOSIS — H00.019 HORDEOLUM EXTERNUM UNSPECIFIED EYE, UNSPECIFIED EYELID: ICD-10-CM

## 2021-06-11 PROCEDURE — 99212 OFFICE O/P EST SF 10 MIN: CPT | Mod: 95

## 2021-06-11 NOTE — PHYSICAL EXAM
[No Acute Distress] : no acute distress [Well Nourished] : well nourished [Well Developed] : well developed [de-identified] : right upper lid redness,swelling

## 2021-06-11 NOTE — HISTORY OF PRESENT ILLNESS
[FreeTextEntry8] : I have seen and spoken to pt using audio and video. Looks good on video. Speaks clearly. \par Pt reports redness and swelling of right upper lid since yesterday.\par No fever,chills,pain ,discharge.

## 2021-07-12 DIAGNOSIS — Z11.59 ENCOUNTER FOR SCREENING FOR OTHER VIRAL DISEASES: ICD-10-CM

## 2021-07-17 ENCOUNTER — NON-APPOINTMENT (OUTPATIENT)
Age: 66
End: 2021-07-17

## 2021-07-18 LAB — SARS-COV-2 N GENE NPH QL NAA+PROBE: NOT DETECTED

## 2021-08-09 ENCOUNTER — RX RENEWAL (OUTPATIENT)
Age: 66
End: 2021-08-09

## 2021-08-28 ENCOUNTER — APPOINTMENT (OUTPATIENT)
Dept: INTERNAL MEDICINE | Facility: CLINIC | Age: 66
End: 2021-08-28

## 2021-10-06 PROBLEM — R77.9 ELEVATED BLOOD PROTEIN: Status: ACTIVE | Noted: 2018-10-24

## 2021-10-28 ENCOUNTER — APPOINTMENT (OUTPATIENT)
Dept: INTERNAL MEDICINE | Facility: CLINIC | Age: 66
End: 2021-10-28
Payer: MEDICARE

## 2021-10-28 VITALS
WEIGHT: 138 LBS | DIASTOLIC BLOOD PRESSURE: 60 MMHG | SYSTOLIC BLOOD PRESSURE: 120 MMHG | HEIGHT: 68 IN | BODY MASS INDEX: 20.92 KG/M2 | TEMPERATURE: 97.3 F

## 2021-10-28 DIAGNOSIS — M79.18 MYALGIA, OTHER SITE: ICD-10-CM

## 2021-10-28 PROCEDURE — 99214 OFFICE O/P EST MOD 30 MIN: CPT

## 2021-10-28 NOTE — PHYSICAL EXAM
[Normal] : affect was normal and insight and judgment were intact [de-identified] : Within normal range of motion of the lumbar spine with localized tenderness along the L4 S1 joint space.  Within normal range of motion of bilateral lower extremities

## 2021-10-28 NOTE — HISTORY OF PRESENT ILLNESS
[Constant] : constant [Moderate] : moderate [Rest] : rest [Ice] : ice [OTC Remedies] : OTC remedies [Activity] : with activity [Worsening] : worsening [de-identified] : mid lower back pain [FreeTextEntry1] : 4 months ago since 6/2021 [FreeTextEntry7] : mid lumbar/sacral spine [FreeTextEntry2] : Dull pain radiates to bilateral SI joints and buttocks [FreeTextEntry5] : Motrin helps relieve the pain slightly [FreeTextEntry8] : Patient presents complaining of 4-month history of persistent low back pain that is becoming at times unbearable.  In June 2021 patient decided to get a new mattress.  She initially bought a firmer mattress but noticed worsening low back pain after sleeping on the firmer mattress for several weeks.  Since June she has changed the mattress total of 3 times and is currently on her fourth mattress.\par Her lower back pain has not significantly improved with change in the mattress.  She has centralized lumbar sacral back pain that is described as constant dull ache that radiates to bilateral buttocks.  She denies any history of trauma or injury.  She has been taking Advil 400 to 800 mg daily which only minimally helps the pain.\par She has tried Valium 5 mg as needed as a muscle relaxant with minimal relief.

## 2021-10-30 RX ORDER — MELOXICAM 15 MG/1
15 TABLET ORAL
Qty: 30 | Refills: 1 | Status: COMPLETED | COMMUNITY
Start: 2021-10-28 | End: 2021-10-30

## 2021-11-03 ENCOUNTER — APPOINTMENT (OUTPATIENT)
Dept: ORTHOPEDIC SURGERY | Facility: CLINIC | Age: 66
End: 2021-11-03
Payer: MEDICARE

## 2021-11-03 VITALS — HEIGHT: 68 IN | WEIGHT: 138 LBS | BODY MASS INDEX: 20.92 KG/M2

## 2021-11-03 PROCEDURE — 99204 OFFICE O/P NEW MOD 45 MIN: CPT

## 2021-11-03 PROCEDURE — 72100 X-RAY EXAM L-S SPINE 2/3 VWS: CPT

## 2021-12-06 DIAGNOSIS — M54.2 CERVICALGIA: ICD-10-CM

## 2022-01-27 ENCOUNTER — NON-APPOINTMENT (OUTPATIENT)
Age: 67
End: 2022-01-27

## 2022-01-27 ENCOUNTER — APPOINTMENT (OUTPATIENT)
Dept: INTERNAL MEDICINE | Facility: CLINIC | Age: 67
End: 2022-01-27
Payer: MEDICARE

## 2022-01-27 ENCOUNTER — LABORATORY RESULT (OUTPATIENT)
Age: 67
End: 2022-01-27

## 2022-01-27 VITALS
SYSTOLIC BLOOD PRESSURE: 140 MMHG | HEIGHT: 68 IN | WEIGHT: 140 LBS | DIASTOLIC BLOOD PRESSURE: 80 MMHG | BODY MASS INDEX: 21.22 KG/M2 | TEMPERATURE: 97.7 F

## 2022-01-27 DIAGNOSIS — Z23 ENCOUNTER FOR IMMUNIZATION: ICD-10-CM

## 2022-01-27 DIAGNOSIS — Z85.3 PERSONAL HISTORY OF MALIGNANT NEOPLASM OF BREAST: ICD-10-CM

## 2022-01-27 DIAGNOSIS — Z00.00 ENCOUNTER FOR GENERAL ADULT MEDICAL EXAMINATION W/OUT ABNORMAL FINDINGS: ICD-10-CM

## 2022-01-27 DIAGNOSIS — Z01.84 ENCOUNTER FOR ANTIBODY RESPONSE EXAMINATION: ICD-10-CM

## 2022-01-27 PROCEDURE — 93000 ELECTROCARDIOGRAM COMPLETE: CPT

## 2022-01-27 PROCEDURE — G0009: CPT

## 2022-01-27 PROCEDURE — 99214 OFFICE O/P EST MOD 30 MIN: CPT | Mod: 25

## 2022-01-27 PROCEDURE — 90732 PPSV23 VACC 2 YRS+ SUBQ/IM: CPT

## 2022-01-27 PROCEDURE — 36415 COLL VENOUS BLD VENIPUNCTURE: CPT

## 2022-01-27 PROCEDURE — G0438: CPT

## 2022-01-27 RX ORDER — NITROFURANTOIN (MONOHYDRATE/MACROCRYSTALS) 25; 75 MG/1; MG/1
100 CAPSULE ORAL
Qty: 14 | Refills: 0 | Status: COMPLETED | COMMUNITY
Start: 2020-12-16 | End: 2022-01-27

## 2022-01-27 RX ORDER — MESALAMINE 0.38 G/1
0.38 CAPSULE, EXTENDED RELEASE ORAL
Qty: 360 | Refills: 3 | Status: COMPLETED | COMMUNITY
Start: 2020-01-14 | End: 2022-01-27

## 2022-01-28 ENCOUNTER — LABORATORY RESULT (OUTPATIENT)
Age: 67
End: 2022-01-28

## 2022-01-31 ENCOUNTER — NON-APPOINTMENT (OUTPATIENT)
Age: 67
End: 2022-01-31

## 2022-01-31 LAB
25(OH)D3 SERPL-MCNC: 29.2 NG/ML
ALBUMIN SERPL ELPH-MCNC: 4.7 G/DL
ALP BLD-CCNC: 85 U/L
ALT SERPL-CCNC: 16 U/L
ANION GAP SERPL CALC-SCNC: 13 MMOL/L
APPEARANCE: CLEAR
AST SERPL-CCNC: 29 U/L
BASOPHILS # BLD AUTO: 0.03 K/UL
BASOPHILS NFR BLD AUTO: 0.4 %
BILIRUB SERPL-MCNC: 1.1 MG/DL
BILIRUBIN URINE: NEGATIVE
BLOOD URINE: NEGATIVE
BUN SERPL-MCNC: 18 MG/DL
CALCIUM SERPL-MCNC: 10 MG/DL
CHLORIDE SERPL-SCNC: 99 MMOL/L
CHOLEST SERPL-MCNC: 187 MG/DL
CO2 SERPL-SCNC: 26 MMOL/L
COLOR: NORMAL
COVID-19 NUCLEOCAPSID  GAM ANTIBODY INTERPRETATION: NEGATIVE
COVID-19 SPIKE DOMAIN ANTIBODY INTERPRETATION: POSITIVE
CREAT SERPL-MCNC: 1.08 MG/DL
EOSINOPHIL # BLD AUTO: 0.15 K/UL
EOSINOPHIL NFR BLD AUTO: 2.1 %
ERYTHROCYTE [SEDIMENTATION RATE] IN BLOOD BY WESTERGREN METHOD: 30 MM/HR
ESTIMATED AVERAGE GLUCOSE: 97 MG/DL
GLUCOSE QUALITATIVE U: NEGATIVE
GLUCOSE SERPL-MCNC: 90 MG/DL
HBA1C MFR BLD HPLC: 5 %
HCT VFR BLD CALC: 43.9 %
HDLC SERPL-MCNC: 89 MG/DL
HGB BLD-MCNC: 14.1 G/DL
IMM GRANULOCYTES NFR BLD AUTO: 0.3 %
KETONES URINE: NEGATIVE
LDLC SERPL CALC-MCNC: 87 MG/DL
LDLC SERPL DIRECT ASSAY-MCNC: 85 MG/DL
LEUKOCYTE ESTERASE URINE: ABNORMAL
LYMPHOCYTES # BLD AUTO: 1.66 K/UL
LYMPHOCYTES NFR BLD AUTO: 23.4 %
MAN DIFF?: NORMAL
MCHC RBC-ENTMCNC: 31.8 PG
MCHC RBC-ENTMCNC: 32.1 GM/DL
MCV RBC AUTO: 98.9 FL
MONOCYTES # BLD AUTO: 0.55 K/UL
MONOCYTES NFR BLD AUTO: 7.7 %
NEUTROPHILS # BLD AUTO: 4.69 K/UL
NEUTROPHILS NFR BLD AUTO: 66.1 %
NITRITE URINE: NEGATIVE
NONHDLC SERPL-MCNC: 98 MG/DL
PH URINE: 6
PLATELET # BLD AUTO: 318 K/UL
POTASSIUM SERPL-SCNC: 4 MMOL/L
PROT SERPL-MCNC: 8.7 G/DL
PROTEIN URINE: NEGATIVE
RBC # BLD: 4.44 M/UL
RBC # FLD: 15 %
SARS-COV-2 AB SERPL IA-ACNC: >250 U/ML
SARS-COV-2 AB SERPL QL IA: 0.07 INDEX
SODIUM SERPL-SCNC: 138 MMOL/L
SPECIFIC GRAVITY URINE: 1.01
T3 SERPL-MCNC: 75 NG/DL
T3FREE SERPL-MCNC: 2.46 PG/ML
T3RU NFR SERPL: 0.6 TBI
T4 FREE SERPL-MCNC: 1 NG/DL
T4 SERPL-MCNC: 4.8 UG/DL
TRIGL SERPL-MCNC: 55 MG/DL
TSH SERPL-ACNC: 2.92 UIU/ML
UROBILINOGEN URINE: NORMAL
WBC # FLD AUTO: 7.1 K/UL

## 2022-02-10 PROBLEM — Z85.3 HISTORY OF MALIGNANT NEOPLASM OF LEFT BREAST: Status: RESOLVED | Noted: 2019-02-14 | Resolved: 2022-02-10

## 2022-02-10 PROBLEM — Z01.84 IMMUNITY STATUS TESTING: Status: ACTIVE | Noted: 2022-01-27

## 2022-02-10 RX ORDER — NEOMYCIN AND POLYMYXIN B SULFATES AND DEXAMETHASONE 3.5; 10000; 1 MG/G; [IU]/G; MG/G
3.5-10000-0.1 OINTMENT OPHTHALMIC TWICE DAILY
Qty: 1 | Refills: 3 | Status: COMPLETED | COMMUNITY
Start: 2021-06-11 | End: 2022-02-10

## 2022-02-10 NOTE — ASSESSMENT
[FreeTextEntry1] : I discussed Shingrix vaccine with pt.\par She had Zostavax in 2016.  She will hold off on the Shingrix vaccine for now-may consider it in the spring of 2021.\par \par She received Pneumovax 23 today.\par She declines flu shot.\par \par I will discuss with GI when she should do a repeat colonoscopy

## 2022-02-10 NOTE — PHYSICAL EXAM
[No Acute Distress] : no acute distress [Well Nourished] : well nourished [Well Developed] : well developed [Well-Appearing] : well-appearing [Normal Sclera/Conjunctiva] : normal sclera/conjunctiva [PERRL] : pupils equal round and reactive to light [EOMI] : extraocular movements intact [Normal Outer Ear/Nose] : the outer ears and nose were normal in appearance [No JVD] : no jugular venous distention [Supple] : supple [No Lymphadenopathy] : no lymphadenopathy [No Respiratory Distress] : no respiratory distress  [Clear to Auscultation] : lungs were clear to auscultation bilaterally [No Accessory Muscle Use] : no accessory muscle use [Normal Rate] : normal rate  [Regular Rhythm] : with a regular rhythm [Normal S1, S2] : normal S1 and S2 [No Murmur] : no murmur heard [No Carotid Bruits] : no carotid bruits [No Abdominal Bruit] : a ~M bruit was not heard ~T in the abdomen [No Varicosities] : no varicosities [Pedal Pulses Present] : the pedal pulses are present [No Edema] : there was no peripheral edema [No Extremity Clubbing/Cyanosis] : no extremity clubbing/cyanosis [No Palpable Aorta] : no palpable aorta [Soft] : abdomen soft [Non Tender] : non-tender [Non-distended] : non-distended [No Masses] : no abdominal mass palpated [No HSM] : no HSM [Normal Bowel Sounds] : normal bowel sounds [Normal Posterior Cervical Nodes] : no posterior cervical lymphadenopathy [Normal Anterior Cervical Nodes] : no anterior cervical lymphadenopathy [No CVA Tenderness] : no CVA  tenderness [No Spinal Tenderness] : no spinal tenderness [No Joint Swelling] : no joint swelling [Grossly Normal Strength/Tone] : grossly normal strength/tone [No Rash] : no rash [Normal Gait] : normal gait [Coordination Grossly Intact] : coordination grossly intact [No Focal Deficits] : no focal deficits [Deep Tendon Reflexes (DTR)] : deep tendon reflexes were 2+ and symmetric [Normal Affect] : the affect was normal [Normal Insight/Judgement] : insight and judgment were intact [de-identified] : large goiter with multiple nodules bilaterally [de-identified] : s/p old left lumpectomy scar (from 19 years ago at 7 O'clock position) and new lumpectomy scar along left outer breast- with localized post radiation changes and induration/ s./p left axillary LN dissection scar-c/d/i

## 2022-02-10 NOTE — HEALTH RISK ASSESSMENT
[Good] : ~his/her~  mood as  good [Yes] : Yes [4 or more  times a week (4 pts)] : 4 or more  times a week (4 points) [1 or 2 (0 pts)] : 1 or 2 (0 points) [Never (0 pts)] : Never (0 points) [No] : In the past 12 months have you used drugs other than those required for medical reasons? No [No falls in past year] : Patient reported no falls in the past year [0] : 2) Feeling down, depressed, or hopeless: Not at all (0) [Patient reported mammogram was abnormal] : Patient reported mammogram was abnormal [Patient reported colonoscopy was abnormal] : Patient reported colonoscopy was abnormal [HIV test declined] : HIV test declined [Hepatitis C test declined] : Hepatitis C test declined [None] : None [With Significant Other] : lives with significant other [# of Members in Household ___] :  household currently consist of [unfilled] member(s) [Retired] : retired [College] : College [] :  [# Of Children ___] : has [unfilled] children [Sexually Active] : sexually active [Feels Safe at Home] : Feels safe at home [Fully functional (bathing, dressing, toileting, transferring, walking, feeding)] : Fully functional (bathing, dressing, toileting, transferring, walking, feeding) [Fully functional (using the telephone, shopping, preparing meals, housekeeping, doing laundry, using] : Fully functional and needs no help or supervision to perform IADLs (using the telephone, shopping, preparing meals, housekeeping, doing laundry, using transportation, managing medications and managing finances) [Reports normal functional visual acuity (ie: able to read med bottle)] : Reports normal functional visual acuity [Smoke Detector] : smoke detector [Seat Belt] :  uses seat belt [Sunscreen] : uses sunscreen [With Patient/Caregiver] : , with patient/caregiver [Current] : Current [de-identified] : none [de-identified] : GI- Dr. Ely, Oncologist Dr. Mavis Wharton [de-identified] : currently smokes 1/2-1ppd (has been smoking since age 15) [de-identified] : drinks1-2 rum cocktails every night [Audit-CScore] : 4 [de-identified] : walks daily-prior to the start of the COVID 19 pandemic -went to the gym 2-3 times a week [de-identified] : overall healthy balanced and varied diet without any exclusions  [[id number]] : 0 [Change in mental status noted] : No change in mental status noted [Language] : denies difficulty with language [Behavior] : denies difficulty with behavior [Learning/Retaining New Information] : denies difficulty learning/retaining new information [Handling Complex Tasks] : denies difficulty handling complex tasks [Reasoning] : denies difficulty with reasoning [Spatial Ability and Orientation] : denies difficulty with spatial ability and orientation [High Risk Behavior] : no high risk behavior [Reports changes in hearing] : Reports no changes in hearing [Reports changes in vision] : Reports no changes in vision [Reports changes in dental health] : Reports no changes in dental health [Guns at Home] : no guns at home [Travel to Developing Areas] : does not  travel to developing areas [TB Exposure] : is not being exposed to tuberculosis [Caregiver Concerns] : does not have caregiver concerns [MammogramDate] : 03/21 [MammogramComments] : was diagnosed with breast cancer-will f/u with Dr. Mayfield 9/2019 [PapSmearDate] : 11/19 [PapSmearComments] : with Dr. Clark [BoneDensityDate] : 03/21 [BoneDensityComments] : T scores were -2.7 and -1.7 [ColonoscopyDate] : 11/20 [ColonoscopyComments] : with MOH-confirmed Ulcerative Colitis-will need repeat colonoscopy [AdvancecareDate] : 01/22 [FreeTextEntry4] : Patient does not have a designated health care proxy.  Patient will discuss with family.  Information and health care proxy forms were given to the pt.\par

## 2022-02-10 NOTE — REVIEW OF SYSTEMS
[Recent Change In Weight] : ~T recent weight change [Negative] : Heme/Lymph [Diarrhea] : diarrhea [FreeTextEntry2] : weight loss of 16 lbs since last  year

## 2022-02-10 NOTE — HISTORY OF PRESENT ILLNESS
[FreeTextEntry1] : Pt. presents for a comprehensive evaluation for multiple medical issues.\par \par  [de-identified] : She had a very difficult past  1-2years.  Her older sister who was 70 yo and lived in [place]  suddenly in 2020 without any warning.  She may have  from a severe asthma attack. Patient is mourning her loss.\par \par Pt was diagnosed with invasive ductal carcinoma of the left breast- underwent  left lumpectomy 2019 with Dr. Vivek Mayfield at [facility] and had intra-operative localized XRT. 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 She had f/u with Dr. Mayfield in ~2019.\par \par She is doing well since the surgery and is now on Anastrazole 1mg qd.\par She had f/u with oncologist, Dr. Mavis Wharton in [facility] last month (she sees her q ~6months) .\par \par Last year, her BP was noted to be labile with multiple high readings. Stress level has gone down and she lost weight and reduced salt intake and BP has been in normal range.  She decided not to take the amlodipine and wants to continue to monitor the BP. She denies any chest pressure or palpitations of headaches. Echo revealed mild/ grade1  LVH and moderate MR.\par \par \par Early this year-she complained of daily watery diarrheal symptoms-2-3 times a day with copious mucous at times and occasional blood from internal hemorrhoid for 2-3 months.  She saw Dr. HURD and colonoscopy confirmed dx of ulcerative colitis. \par She is doing much better on Mesalamine qd.

## 2022-02-18 ENCOUNTER — NON-APPOINTMENT (OUTPATIENT)
Age: 67
End: 2022-02-18

## 2022-02-18 ENCOUNTER — APPOINTMENT (OUTPATIENT)
Dept: PULMONOLOGY | Facility: CLINIC | Age: 67
End: 2022-02-18
Payer: MEDICARE

## 2022-02-18 VITALS — BODY MASS INDEX: 21.22 KG/M2 | HEIGHT: 68 IN | WEIGHT: 140 LBS

## 2022-02-18 DIAGNOSIS — F17.210 NICOTINE DEPENDENCE, CIGARETTES, UNCOMPLICATED: ICD-10-CM

## 2022-02-18 PROCEDURE — G0296 VISIT TO DETERM LDCT ELIG: CPT

## 2022-02-18 NOTE — PLAN
[FreeTextEntry1] : Her LDCT is scheduled for 3/7/22 at the [facility] location.  She will follow up with Dr. Leonard for the results.

## 2022-02-18 NOTE — HISTORY OF PRESENT ILLNESS
[TextBox_13] : She denies hemoptysis, denies new cough, denies unexplained weight loss.\par She is a current smoker with a 30 pack year smoking history (0.6PPD x 50 years, 0.4PPD x 1 year).  She has a h/o breast cancer (2000 and 2019; s/p sx and radiation).  She is referred by Dr. Arely Leonard.\par \par

## 2022-03-03 ENCOUNTER — APPOINTMENT (OUTPATIENT)
Dept: ORTHOPEDIC SURGERY | Facility: CLINIC | Age: 67
End: 2022-03-03
Payer: MEDICARE

## 2022-03-03 VITALS
HEART RATE: 78 BPM | HEIGHT: 68 IN | WEIGHT: 140 LBS | DIASTOLIC BLOOD PRESSURE: 99 MMHG | SYSTOLIC BLOOD PRESSURE: 161 MMHG | BODY MASS INDEX: 21.22 KG/M2

## 2022-03-03 DIAGNOSIS — M25.522 PAIN IN LEFT ELBOW: ICD-10-CM

## 2022-03-03 PROCEDURE — 24650 CLTX RDL HEAD/NCK FX WO MNPJ: CPT | Mod: LT

## 2022-03-03 PROCEDURE — 73090 X-RAY EXAM OF FOREARM: CPT | Mod: LT

## 2022-03-03 PROCEDURE — 73080 X-RAY EXAM OF ELBOW: CPT | Mod: LT

## 2022-03-03 PROCEDURE — 99215 OFFICE O/P EST HI 40 MIN: CPT | Mod: 57

## 2022-03-03 NOTE — PHYSICAL EXAM
[de-identified] : The patient is sitting comfortably in the exam room. \par Left arm.\par -Skin is intact, no swelling, no ecchymosis\par -Pronation 90, supination 90\par -Range of motion elbow 10degrees to 100 degrees with pain at endpoints\par -Elbow is stable to varus valgus stress\par -Nontender to palpation over the distal radius and ulnar styloid\par -Able to make a fist, no swelling of the fingers\par -Tenderness to palpation over the radial head\par -Sensation is intact median, radial, ulnar, axillary nerves\par -Motor is intact median, radial, ulnar, axillary nerves\par -Hand is warm and well-perfused, Palpable radial and ulnar pulses [de-identified] : X-rays of the left elbow were taken in the office today including AP oblique and lateral.  X-rays show good overall alignment of the elbow with good alignment of the radiocapitellar joint.  The elbow is well reduced.  There is a posterior fat pad sign identified on the lateral.  There is a nondisplaced fracture of the radial neck at the base of the radial head.

## 2022-03-03 NOTE — DISCUSSION/SUMMARY
[de-identified] : -The risks and benefits of operative versus nonoperative management were discussed at length with the patient. The patient shows a good understanding of the injury and treatment options. They would like to move forward with nonoperative management of the left nondisplaced radial neck fracture.\par -Of motion exercises at home\par -Continue Mobic and she can add Tylenol\par -We will likely start physical therapy when she follows up\par -Follow-up in 3 weeks with x-rays at that time\par -All of their questions and concerns were addressed.

## 2022-03-03 NOTE — HISTORY OF PRESENT ILLNESS
[de-identified] : Ms. MATHEUS TIERNEY is a 67 year woman presents today for left elbow and forearm pain. Patient states she fell while rolling skating on 2/28/22. She is here for an evaluation and discuss further treatment options.  She denies any numbness or tingling in the left arm or hand.  She takes Mobic at baseline for back pain.  She just finished physical therapy for her back.\par

## 2022-03-03 NOTE — REVIEW OF SYSTEMS
[Negative] : Heme/Lymph [FreeTextEntry9] : Left elbow pain Wartpeel Counseling:  I discussed with the patient the risks of Wartpeel including but not limited to erythema, scaling, itching, weeping, crusting, and pain.

## 2022-03-07 ENCOUNTER — APPOINTMENT (OUTPATIENT)
Dept: ORTHOPEDIC SURGERY | Facility: CLINIC | Age: 67
End: 2022-03-07
Payer: MEDICARE

## 2022-03-07 ENCOUNTER — APPOINTMENT (OUTPATIENT)
Dept: CT IMAGING | Facility: IMAGING CENTER | Age: 67
End: 2022-03-07
Payer: MEDICARE

## 2022-03-07 ENCOUNTER — OUTPATIENT (OUTPATIENT)
Dept: OUTPATIENT SERVICES | Facility: HOSPITAL | Age: 67
LOS: 1 days | End: 2022-03-07
Payer: MEDICARE

## 2022-03-07 VITALS
WEIGHT: 140 LBS | BODY MASS INDEX: 21.22 KG/M2 | HEIGHT: 68 IN | SYSTOLIC BLOOD PRESSURE: 184 MMHG | DIASTOLIC BLOOD PRESSURE: 114 MMHG | HEART RATE: 80 BPM

## 2022-03-07 DIAGNOSIS — Z98.890 OTHER SPECIFIED POSTPROCEDURAL STATES: Chronic | ICD-10-CM

## 2022-03-07 DIAGNOSIS — Z72.0 TOBACCO USE: ICD-10-CM

## 2022-03-07 PROCEDURE — 72100 X-RAY EXAM L-S SPINE 2/3 VWS: CPT

## 2022-03-07 PROCEDURE — 71271 CT THORAX LUNG CANCER SCR C-: CPT | Mod: 26

## 2022-03-07 PROCEDURE — 99214 OFFICE O/P EST MOD 30 MIN: CPT

## 2022-03-07 PROCEDURE — 71271 CT THORAX LUNG CANCER SCR C-: CPT

## 2022-03-10 ENCOUNTER — NON-APPOINTMENT (OUTPATIENT)
Age: 67
End: 2022-03-10

## 2022-03-10 DIAGNOSIS — R91.1 SOLITARY PULMONARY NODULE: ICD-10-CM

## 2022-03-17 ENCOUNTER — APPOINTMENT (OUTPATIENT)
Dept: SURGERY | Facility: CLINIC | Age: 67
End: 2022-03-17
Payer: MEDICARE

## 2022-03-17 VITALS
BODY MASS INDEX: 21.22 KG/M2 | SYSTOLIC BLOOD PRESSURE: 159 MMHG | DIASTOLIC BLOOD PRESSURE: 101 MMHG | WEIGHT: 140 LBS | HEIGHT: 68 IN

## 2022-03-17 DIAGNOSIS — Z83.49 FAMILY HISTORY OF OTHER ENDOCRINE, NUTRITIONAL AND METABOLIC DISEASES: ICD-10-CM

## 2022-03-17 DIAGNOSIS — Z86.39 PERSONAL HISTORY OF OTHER ENDOCRINE, NUTRITIONAL AND METABOLIC DISEASE: ICD-10-CM

## 2022-03-17 PROCEDURE — 99204 OFFICE O/P NEW MOD 45 MIN: CPT

## 2022-03-20 ENCOUNTER — NON-APPOINTMENT (OUTPATIENT)
Age: 67
End: 2022-03-20

## 2022-03-20 PROBLEM — Z83.49 FAMILY HISTORY OF GRAVES' DISEASE: Status: ACTIVE | Noted: 2022-03-20

## 2022-03-20 NOTE — REASON FOR VISIT
[Initial Consultation] : an initial consultation for [FreeTextEntry2] : Enlarging goiter [Other: _____] : [unfilled]

## 2022-03-20 NOTE — PHYSICAL EXAM
[de-identified] : no cervical or supraclavicular adenopathy, trachea midline, thyroid with bilateral firm enlargement without discrete mass [Normal] : no neck adenopathy [de-identified] : Skin:  normal appearance.  no rash, nodules, vesicles, or erythema,\par Musculoskeletal:  full range of motion and no deformities appreciated\par Neurological:  grossly intact\par Psychiatric:  oriented to person, place and time with appropriate affect

## 2022-03-20 NOTE — CONSULT LETTER
[Dear  ___] : Dear  [unfilled], [Consult Letter:] : I had the pleasure of evaluating your patient, [unfilled]. [Please see my note below.] : Please see my note below. [Consult Closing:] : Thank you very much for allowing me to participate in the care of this patient.  If you have any questions, please do not hesitate to contact me. [Sincerely,] : Sincerely, [FreeTextEntry2] : Dr. Arely Leonard [FreeTextEntry3] : Jennifer Zavaleta MD, FACS\par Assistant Professor of Surgery and Otolaryngology\par [facility]\par

## 2022-03-20 NOTE — ASSESSMENT
[FreeTextEntry1] : Patient with gradually enlarging thyroid goiter.  Patient is considering surgical excision.  I have requested a thyroid ultrasound to assess change in size. I have reviewed the pathophysiology of the disease process, the area anatomy and the rationale for surgery.  I discussed the risks, benefits and alternative treatments which include but are not limited to bleeding, infection, numbness, hoarseness, hypocalcemia, scarring, and need for reoperation.  I have answered the patient's questions to their satisfaction.  The patient will contact my office to review results.  If no surgery performed, RTO 6 months.

## 2022-03-20 NOTE — HISTORY OF PRESENT ILLNESS
[de-identified] : Patient referred by Dr. Leonard for evaluation of enlarging thyroid goiter.  Patient was diagnosed with Graves' disease in the 1980s treated with PTU for 2 years with resolution.  Patient has been observed for a gradually enlarging thyroid goiter.  Thyroid ultrasound December 2020: Right lobe 7.3 x 3.6 x 3.4 cm, left lobe 7.4 x 3.9 x 3.5 cm, no discrete nodules or enlarged lymph nodes.  Blood work January 2022: TSH 2.9, free T4 1, total T3 75.  Patient denies dysphagia, shortness of breath or change in voice.  Patient has been treated with radiation for breast cancer twice. I have reviewed all old and new data and available images.

## 2022-03-21 ENCOUNTER — APPOINTMENT (OUTPATIENT)
Dept: NUCLEAR MEDICINE | Facility: IMAGING CENTER | Age: 67
End: 2022-03-21
Payer: MEDICARE

## 2022-03-21 ENCOUNTER — OUTPATIENT (OUTPATIENT)
Dept: OUTPATIENT SERVICES | Facility: HOSPITAL | Age: 67
LOS: 1 days | End: 2022-03-21
Payer: MEDICARE

## 2022-03-21 DIAGNOSIS — Z98.890 OTHER SPECIFIED POSTPROCEDURAL STATES: Chronic | ICD-10-CM

## 2022-03-21 DIAGNOSIS — R91.1 SOLITARY PULMONARY NODULE: ICD-10-CM

## 2022-03-21 PROCEDURE — 78816 PET IMAGE W/CT FULL BODY: CPT | Mod: 26,PI,MH

## 2022-03-21 PROCEDURE — A9552: CPT

## 2022-03-21 PROCEDURE — 78816 PET IMAGE W/CT FULL BODY: CPT

## 2022-03-24 ENCOUNTER — APPOINTMENT (OUTPATIENT)
Dept: ORTHOPEDIC SURGERY | Facility: CLINIC | Age: 67
End: 2022-03-24
Payer: MEDICARE

## 2022-03-24 VITALS — BODY MASS INDEX: 21.22 KG/M2 | HEIGHT: 68 IN | WEIGHT: 140 LBS

## 2022-03-24 DIAGNOSIS — S52.122A DISPLACED FRACTURE OF HEAD OF LEFT RADIUS, INITIAL ENCOUNTER FOR CLOSED FRACTURE: ICD-10-CM

## 2022-03-24 PROCEDURE — 73080 X-RAY EXAM OF ELBOW: CPT | Mod: LT

## 2022-03-24 PROCEDURE — 99024 POSTOP FOLLOW-UP VISIT: CPT

## 2022-03-24 NOTE — HISTORY OF PRESENT ILLNESS
[de-identified] : Ms. MATHEUS TIERNEY is a 67 year woman presents today for follow-up of minimally displaced left radial head fracture that occurred while rolling skating on 2/28/22. \par \par Since her last visit she is feeling much better.  She reports she has little to no pain.  Her range of motion is significantly better.  She has not been using a sling.  She denies any numbness or tingling in the left hand.

## 2022-03-24 NOTE — PHYSICAL EXAM
[de-identified] : The patient is sitting comfortably in the exam room. \par Left arm.\par -Skin is intact, no swelling, no ecchymosis\par -Pronation 90, supination 90\par -Range of motion elbow 5 degrees to 125 degrees with pain at endpoints\par -Elbow is stable to varus valgus stress\par -Nontender to palpation over the distal radius and ulnar styloid\par -Able to make a fist, no swelling of the fingers\par -Minimal tenderness to palpation over the radial head\par -Sensation is intact median, radial, ulnar, axillary nerves\par -Motor is intact median, radial, ulnar, axillary nerves\par -Hand is warm and well-perfused, Palpable radial and ulnar pulses [de-identified] : X-rays of the left elbow were taken in the office today including AP oblique and lateral.  X-rays show good overall alignment of the elbow with good alignment of the radiocapitellar joint.  The elbow is well reduced.  The fracture of the radial head is in good alignment.  No displacement of the fracture.

## 2022-03-24 NOTE — DISCUSSION/SUMMARY
[de-identified] : 67-year-old woman 3.5 weeks out from nonoperative management of left radial head fracture, doing well\par \par -Range of motion exercises at home\par -Tylenol for pain\par -She would like to wait on formal physical therapy\par -Follow-up in 2 months with x-rays at that time\par -All of her questions and concerns were addressed.

## 2022-04-08 ENCOUNTER — RESULT REVIEW (OUTPATIENT)
Age: 67
End: 2022-04-08

## 2022-04-11 PROBLEM — Z11.59 SCREENING FOR VIRAL DISEASE: Status: ACTIVE | Noted: 2021-07-12

## 2022-04-15 ENCOUNTER — APPOINTMENT (OUTPATIENT)
Dept: MAMMOGRAPHY | Facility: IMAGING CENTER | Age: 67
End: 2022-04-15
Payer: MEDICARE

## 2022-04-15 ENCOUNTER — APPOINTMENT (OUTPATIENT)
Dept: ULTRASOUND IMAGING | Facility: IMAGING CENTER | Age: 67
End: 2022-04-15
Payer: MEDICARE

## 2022-04-15 ENCOUNTER — RESULT REVIEW (OUTPATIENT)
Age: 67
End: 2022-04-15

## 2022-04-15 ENCOUNTER — OUTPATIENT (OUTPATIENT)
Dept: OUTPATIENT SERVICES | Facility: HOSPITAL | Age: 67
LOS: 1 days | End: 2022-04-15
Payer: MEDICARE

## 2022-04-15 DIAGNOSIS — Z00.8 ENCOUNTER FOR OTHER GENERAL EXAMINATION: ICD-10-CM

## 2022-04-15 DIAGNOSIS — Z98.890 OTHER SPECIFIED POSTPROCEDURAL STATES: Chronic | ICD-10-CM

## 2022-04-15 PROCEDURE — 76641 ULTRASOUND BREAST COMPLETE: CPT | Mod: 26,50

## 2022-04-15 PROCEDURE — 76536 US EXAM OF HEAD AND NECK: CPT | Mod: 26

## 2022-04-15 PROCEDURE — G0279: CPT

## 2022-04-15 PROCEDURE — 77066 DX MAMMO INCL CAD BI: CPT

## 2022-04-15 PROCEDURE — 77066 DX MAMMO INCL CAD BI: CPT | Mod: 26

## 2022-04-15 PROCEDURE — G0279: CPT | Mod: 26

## 2022-04-15 PROCEDURE — 76641 ULTRASOUND BREAST COMPLETE: CPT

## 2022-04-15 PROCEDURE — 76536 US EXAM OF HEAD AND NECK: CPT

## 2022-05-03 ENCOUNTER — NON-APPOINTMENT (OUTPATIENT)
Age: 67
End: 2022-05-03

## 2022-05-26 ENCOUNTER — APPOINTMENT (OUTPATIENT)
Dept: ORTHOPEDIC SURGERY | Facility: CLINIC | Age: 67
End: 2022-05-26
Payer: MEDICARE

## 2022-05-26 VITALS — HEIGHT: 68 IN | BODY MASS INDEX: 20.92 KG/M2 | WEIGHT: 138 LBS

## 2022-05-26 PROCEDURE — 99024 POSTOP FOLLOW-UP VISIT: CPT

## 2022-08-02 ENCOUNTER — RESULT REVIEW (OUTPATIENT)
Age: 67
End: 2022-08-02

## 2022-08-02 ENCOUNTER — APPOINTMENT (OUTPATIENT)
Dept: CT IMAGING | Facility: IMAGING CENTER | Age: 67
End: 2022-08-02

## 2022-08-02 ENCOUNTER — OUTPATIENT (OUTPATIENT)
Dept: OUTPATIENT SERVICES | Facility: HOSPITAL | Age: 67
LOS: 1 days | End: 2022-08-02
Payer: MEDICARE

## 2022-08-02 DIAGNOSIS — Z98.890 OTHER SPECIFIED POSTPROCEDURAL STATES: Chronic | ICD-10-CM

## 2022-08-02 DIAGNOSIS — Z00.8 ENCOUNTER FOR OTHER GENERAL EXAMINATION: ICD-10-CM

## 2022-08-02 PROCEDURE — 71250 CT THORAX DX C-: CPT | Mod: 26,MH

## 2022-08-02 PROCEDURE — 71250 CT THORAX DX C-: CPT | Mod: MH

## 2022-08-15 ENCOUNTER — NON-APPOINTMENT (OUTPATIENT)
Age: 67
End: 2022-08-15

## 2022-08-30 ENCOUNTER — INPATIENT (INPATIENT)
Facility: HOSPITAL | Age: 67
LOS: 5 days | Discharge: HOME | DRG: 871 | End: 2022-09-05
Attending: HOSPITALIST | Admitting: HOSPITALIST
Payer: MEDICARE

## 2022-08-30 VITALS
OXYGEN SATURATION: 95 % | RESPIRATION RATE: 20 BRPM | SYSTOLIC BLOOD PRESSURE: 163 MMHG | HEART RATE: 129 BPM | WEIGHT: 132.94 LBS | TEMPERATURE: 103 F | HEIGHT: 68 IN | DIASTOLIC BLOOD PRESSURE: 107 MMHG

## 2022-08-30 DIAGNOSIS — Z98.890 OTHER SPECIFIED POSTPROCEDURAL STATES: Chronic | ICD-10-CM

## 2022-08-30 LAB
ALBUMIN SERPL ELPH-MCNC: 4.5 G/DL — SIGNIFICANT CHANGE UP (ref 3.3–5)
ALP SERPL-CCNC: 78 U/L — SIGNIFICANT CHANGE UP (ref 40–120)
ALT FLD-CCNC: 21 U/L — SIGNIFICANT CHANGE UP (ref 10–45)
AMMONIA BLD-MCNC: 11 UMOL/L — SIGNIFICANT CHANGE UP (ref 11–55)
ANION GAP SERPL CALC-SCNC: 16 MMOL/L — SIGNIFICANT CHANGE UP (ref 5–17)
APPEARANCE UR: CLEAR — SIGNIFICANT CHANGE UP
APTT BLD: 32.1 SEC — SIGNIFICANT CHANGE UP (ref 27.5–35.5)
AST SERPL-CCNC: 40 U/L — SIGNIFICANT CHANGE UP (ref 10–40)
BACTERIA # UR AUTO: NEGATIVE — SIGNIFICANT CHANGE UP
BASOPHILS # BLD AUTO: 0.02 K/UL — SIGNIFICANT CHANGE UP (ref 0–0.2)
BASOPHILS NFR BLD AUTO: 0.2 % — SIGNIFICANT CHANGE UP (ref 0–2)
BILIRUB SERPL-MCNC: 2 MG/DL — HIGH (ref 0.2–1.2)
BILIRUB UR-MCNC: NEGATIVE — SIGNIFICANT CHANGE UP
BUN SERPL-MCNC: 17 MG/DL — SIGNIFICANT CHANGE UP (ref 7–23)
CALCIUM SERPL-MCNC: 9.8 MG/DL — SIGNIFICANT CHANGE UP (ref 8.4–10.5)
CHLORIDE SERPL-SCNC: 91 MMOL/L — LOW (ref 96–108)
CO2 SERPL-SCNC: 23 MMOL/L — SIGNIFICANT CHANGE UP (ref 22–31)
COLOR SPEC: YELLOW — SIGNIFICANT CHANGE UP
CREAT SERPL-MCNC: 1.2 MG/DL — SIGNIFICANT CHANGE UP (ref 0.5–1.3)
DIFF PNL FLD: ABNORMAL
EGFR: 50 ML/MIN/1.73M2 — LOW
EOSINOPHIL # BLD AUTO: 0 K/UL — SIGNIFICANT CHANGE UP (ref 0–0.5)
EOSINOPHIL NFR BLD AUTO: 0 % — SIGNIFICANT CHANGE UP (ref 0–6)
EPI CELLS # UR: 2 /HPF — SIGNIFICANT CHANGE UP
GLUCOSE SERPL-MCNC: 109 MG/DL — HIGH (ref 70–99)
GLUCOSE UR QL: NEGATIVE — SIGNIFICANT CHANGE UP
HCT VFR BLD CALC: 40.7 % — SIGNIFICANT CHANGE UP (ref 34.5–45)
HGB BLD-MCNC: 14 G/DL — SIGNIFICANT CHANGE UP (ref 11.5–15.5)
HYALINE CASTS # UR AUTO: 1 /LPF — SIGNIFICANT CHANGE UP (ref 0–2)
IMM GRANULOCYTES NFR BLD AUTO: 0.8 % — SIGNIFICANT CHANGE UP (ref 0–1.5)
INR BLD: 1.09 RATIO — SIGNIFICANT CHANGE UP (ref 0.88–1.16)
KETONES UR-MCNC: SIGNIFICANT CHANGE UP
LEUKOCYTE ESTERASE UR-ACNC: NEGATIVE — SIGNIFICANT CHANGE UP
LYMPHOCYTES # BLD AUTO: 0.58 K/UL — LOW (ref 1–3.3)
LYMPHOCYTES # BLD AUTO: 5 % — LOW (ref 13–44)
MCHC RBC-ENTMCNC: 32.6 PG — SIGNIFICANT CHANGE UP (ref 27–34)
MCHC RBC-ENTMCNC: 34.4 GM/DL — SIGNIFICANT CHANGE UP (ref 32–36)
MCV RBC AUTO: 94.7 FL — SIGNIFICANT CHANGE UP (ref 80–100)
MONOCYTES # BLD AUTO: 0.68 K/UL — SIGNIFICANT CHANGE UP (ref 0–0.9)
MONOCYTES NFR BLD AUTO: 5.8 % — SIGNIFICANT CHANGE UP (ref 2–14)
NEUTROPHILS # BLD AUTO: 10.33 K/UL — HIGH (ref 1.8–7.4)
NEUTROPHILS NFR BLD AUTO: 88.2 % — HIGH (ref 43–77)
NITRITE UR-MCNC: NEGATIVE — SIGNIFICANT CHANGE UP
NRBC # BLD: 0 /100 WBCS — SIGNIFICANT CHANGE UP (ref 0–0)
PH UR: 6 — SIGNIFICANT CHANGE UP (ref 5–8)
PLATELET # BLD AUTO: 196 K/UL — SIGNIFICANT CHANGE UP (ref 150–400)
POTASSIUM SERPL-MCNC: 3.8 MMOL/L — SIGNIFICANT CHANGE UP (ref 3.5–5.3)
POTASSIUM SERPL-SCNC: 3.8 MMOL/L — SIGNIFICANT CHANGE UP (ref 3.5–5.3)
PROCALCITONIN SERPL-MCNC: 3.35 NG/ML — HIGH (ref 0.02–0.1)
PROT SERPL-MCNC: 9 G/DL — HIGH (ref 6–8.3)
PROT UR-MCNC: 100 — SIGNIFICANT CHANGE UP
PROTHROM AB SERPL-ACNC: 12.5 SEC — SIGNIFICANT CHANGE UP (ref 10.5–13.4)
RAPID RVP RESULT: SIGNIFICANT CHANGE UP
RBC # BLD: 4.3 M/UL — SIGNIFICANT CHANGE UP (ref 3.8–5.2)
RBC # FLD: 15.1 % — HIGH (ref 10.3–14.5)
RBC CASTS # UR COMP ASSIST: 1 /HPF — SIGNIFICANT CHANGE UP (ref 0–4)
SARS-COV-2 RNA SPEC QL NAA+PROBE: SIGNIFICANT CHANGE UP
SODIUM SERPL-SCNC: 130 MMOL/L — LOW (ref 135–145)
SP GR SPEC: 1.01 — SIGNIFICANT CHANGE UP (ref 1.01–1.02)
UROBILINOGEN FLD QL: NEGATIVE — SIGNIFICANT CHANGE UP
WBC # BLD: 11.7 K/UL — HIGH (ref 3.8–10.5)
WBC # FLD AUTO: 11.7 K/UL — HIGH (ref 3.8–10.5)
WBC UR QL: 3 /HPF — SIGNIFICANT CHANGE UP (ref 0–5)

## 2022-08-30 PROCEDURE — 76705 ECHO EXAM OF ABDOMEN: CPT | Mod: 26

## 2022-08-30 PROCEDURE — 71046 X-RAY EXAM CHEST 2 VIEWS: CPT | Mod: 26

## 2022-08-30 PROCEDURE — 93010 ELECTROCARDIOGRAM REPORT: CPT

## 2022-08-30 PROCEDURE — 99220: CPT | Mod: CS

## 2022-08-30 RX ORDER — CEFTRIAXONE 500 MG/1
1000 INJECTION, POWDER, FOR SOLUTION INTRAMUSCULAR; INTRAVENOUS EVERY 24 HOURS
Refills: 0 | Status: DISCONTINUED | OUTPATIENT
Start: 2022-08-30 | End: 2022-09-02

## 2022-08-30 RX ORDER — ACETAMINOPHEN 500 MG
975 TABLET ORAL ONCE
Refills: 0 | Status: COMPLETED | OUTPATIENT
Start: 2022-08-30 | End: 2022-08-30

## 2022-08-30 RX ORDER — CEFTRIAXONE 500 MG/1
1000 INJECTION, POWDER, FOR SOLUTION INTRAMUSCULAR; INTRAVENOUS ONCE
Refills: 0 | Status: COMPLETED | OUTPATIENT
Start: 2022-08-30 | End: 2022-08-30

## 2022-08-30 RX ORDER — AZITHROMYCIN 500 MG/1
500 TABLET, FILM COATED ORAL ONCE
Refills: 0 | Status: COMPLETED | OUTPATIENT
Start: 2022-08-30 | End: 2022-08-30

## 2022-08-30 RX ORDER — AZITHROMYCIN 500 MG/1
500 TABLET, FILM COATED ORAL EVERY 24 HOURS
Refills: 0 | Status: DISCONTINUED | OUTPATIENT
Start: 2022-08-30 | End: 2022-09-01

## 2022-08-30 RX ORDER — SODIUM CHLORIDE 9 MG/ML
1850 INJECTION, SOLUTION INTRAVENOUS ONCE
Refills: 0 | Status: COMPLETED | OUTPATIENT
Start: 2022-08-30 | End: 2022-08-30

## 2022-08-30 RX ORDER — ANASTROZOLE 1 MG/1
1 TABLET ORAL DAILY
Refills: 0 | Status: DISCONTINUED | OUTPATIENT
Start: 2022-08-30 | End: 2022-09-05

## 2022-08-30 RX ORDER — SODIUM CHLORIDE 9 MG/ML
1000 INJECTION, SOLUTION INTRAVENOUS
Refills: 0 | Status: DISCONTINUED | OUTPATIENT
Start: 2022-08-30 | End: 2022-09-01

## 2022-08-30 RX ORDER — IBUPROFEN 200 MG
600 TABLET ORAL ONCE
Refills: 0 | Status: COMPLETED | OUTPATIENT
Start: 2022-08-30 | End: 2022-08-30

## 2022-08-30 RX ADMIN — Medication 975 MILLIGRAM(S): at 20:10

## 2022-08-30 RX ADMIN — Medication 600 MILLIGRAM(S): at 23:39

## 2022-08-30 RX ADMIN — SODIUM CHLORIDE 1850 MILLILITER(S): 9 INJECTION, SOLUTION INTRAVENOUS at 18:40

## 2022-08-30 RX ADMIN — CEFTRIAXONE 100 MILLIGRAM(S): 500 INJECTION, POWDER, FOR SOLUTION INTRAMUSCULAR; INTRAVENOUS at 22:27

## 2022-08-30 RX ADMIN — AZITHROMYCIN 255 MILLIGRAM(S): 500 TABLET, FILM COATED ORAL at 20:33

## 2022-08-30 RX ADMIN — Medication 975 MILLIGRAM(S): at 18:18

## 2022-08-30 RX ADMIN — SODIUM CHLORIDE 150 MILLILITER(S): 9 INJECTION, SOLUTION INTRAVENOUS at 23:39

## 2022-08-30 NOTE — ED CDU PROVIDER INITIAL DAY NOTE - PROGRESS NOTE DETAILS
CDU PROGRESS NOTE PA LEXUS: US resulted, showing Bilobar hepatic cysts. Pt febrile, will give Ibuprofen 600mg po and closely monitor.

## 2022-08-30 NOTE — ED ADULT NURSE NOTE - OBJECTIVE STATEMENT
68 y/o female presents to ED c/o general malaise, fever, tachycardia x3d sent by pulmonologist. No specific complaints, no n/v/d, urinary symptoms, abd pain. A&Ox4 breathing unlabored, abd soft nontender, skin warm dry and intact, ambulatory independently.

## 2022-08-30 NOTE — ED PROVIDER NOTE - OBJECTIVE STATEMENT
67 y F with pmhx of breast cancer x 2 presenting from pulmonology clinic with tachycardia.  For the past few days patient has been feeling unwell/weak spending her days in bed. Able to ambulate but does so weakly per . Decreased appetite but still drinking. Recently had back pain. Went to pulmonology clinic today and was found to be tachy so sent to the ED. Upon arrival found to be febrile but hadn't been aware of fevers prior to ED visit.     No URI symptoms. No vomiting/diarrhea/constipation/belly pain. No chest pain or difficulty breathing.     Meds: Mesalamine, Breast cancer med  PMHx: breast cancer x 2  Current smoker  Vaccinated against covid 67 y F with pmhx of breast cancer x 2 presenting from pulmonology clinic with tachycardia.  For the past few days patient has been feeling unwell/weak spending her days in bed. Able to ambulate but does so weakly per . Decreased appetite but still drinking. Recently had back pain. Went to pulmonology clinic today and was found to be tachy so sent to the ED. Upon arrival found to be febrile but hadn't been aware of fevers prior to ED visit. Reports having two large drinks with liquor per day. Last drink was Sat (about 3 days ago). Smokes but has been unable to for the past 3 days due to feeling sick.     No URI symptoms. No vomiting/diarrhea/constipation/belly pain. No chest pain or difficulty breathing.     Meds: Mesalamine, Breast cancer med  PMHx: breast cancer x 2  Current smoker  Vaccinated against covid

## 2022-08-30 NOTE — ED ADULT NURSE NOTE - CAS EDN DISCHARGE ASSESSMENT
Alert and oriented to person, place and time/Awake/Symptoms improved/Dressing clean and dry/No adverse reaction to first time med in ED

## 2022-08-30 NOTE — ED PROVIDER NOTE - CARE PLAN
1 Principal Discharge DX:	Pneumonia   Principal Discharge DX:	Pneumonia  Secondary Diagnosis:	Hyponatremia  Secondary Diagnosis:	Hyperbilirubinemia

## 2022-08-30 NOTE — ED PROVIDER NOTE - ATTENDING CONTRIBUTION TO CARE
------------ATTENDING NOTE------------  pt w/  sent to ED from Pulmonologist for concerns of fever/tachyardia, pt describes feeling unwell/generalized malaise past 3 days, no specific complaints/obvious source, only h/o remote breast cancer and on hormonal tx, awaiting labs/imaging and close reassessments -->  - Jorge Teague MD   ----------------------------------------------- ------------ATTENDING NOTE------------  pt w/  sent to ED from Pulmonologist for concerns of fever/tachyardia, pt describes feeling unwell/generalized malaise past 3 days, no specific complaints/obvious source, only h/o remote breast cancer and on hormonal tx, awaiting labs/imaging and close reassessments --> plan for CDU for antibiotics, correct sodium, f/u hyperbilirubinemia, d/w PCP, outpt needs assessments.   - Jorge Teague MD   -----------------------------------------------

## 2022-08-30 NOTE — ED PROVIDER NOTE - PROGRESS NOTE DETAILS
+ pna on xray. Azithro and cftx ordered. UA pending. Bili elevated so gallbladder US ordered. CDU notified. -Laure HERNÁNDEZ PGY6 Pt reports she felt hot, sweaty and almost nauseated while getting gall bladder scan. When evaluated in the ED she feels improved. No trouble breathing, no pruritis. No airway edema. Low suspicion for anaphylaxis. Likely vasovagal. -Laure HERNÁNDEZ PGY6

## 2022-08-30 NOTE — ED CDU PROVIDER DISPOSITION NOTE - ATTENDING APP SHARED VISIT CONTRIBUTION OF CARE
Attn - PN with fever, tachycardia - IV Azithromycin and Ceftriaxone - reassess, monitor HR and O2Sat. OR RN

## 2022-08-30 NOTE — ED CDU PROVIDER INITIAL DAY NOTE - NS ED ATTENDING STATEMENT MOD
This was a shared visit with the MILAGROS. I reviewed and verified the documentation and independently performed the documented:

## 2022-08-30 NOTE — ED ADULT TRIAGE NOTE - CHIEF COMPLAINT QUOTE
sent for elevated heart rate from doctor's office. + generalized weakness. Pt denies SOB, chest pain. sent for elevated heart rate from doctor's office. + generalized weakness. Pt denies SOB, chest pain, cough, abd pain, urinary symptoms.

## 2022-08-30 NOTE — ED ADULT NURSE NOTE - CHIEF COMPLAINT QUOTE
sent for elevated heart rate from doctor's office. + generalized weakness. Pt denies SOB, chest pain, cough, abd pain, urinary symptoms.

## 2022-08-30 NOTE — ED CDU PROVIDER INITIAL DAY NOTE - OBJECTIVE STATEMENT
67 y F with pmhx of breast cancer x 2 presenting from pulmonology clinic with tachycardia.  For the past few days patient has been feeling unwell/weak spending her days in bed. Able to ambulate but does so weakly per . Decreased appetite but still drinking. Recently had back pain. Went to pulmonology clinic today and was found to be tachy so sent to the ED. Upon arrival found to be febrile but hadn't been aware of fevers prior to ED visit. Reports having two large drinks with liquor per day. Last drink was Sat (about 3 days ago). Smokes but has been unable to for the past 3 days due to feeling sick.   In ED, patient had laboratory significant for mild leukocytosis, mild hyponatremia, elevated procalcitonin and total Bili. RVP negative and Chest x ray showed  patchy opacity in the left lower lobe suspicious for pneumonia. Pt was started on Ceftriaxone and Azithromycin, sent to CDU for frequent reeval, vitals q 4hrs, iv abx.

## 2022-08-30 NOTE — ED CDU PROVIDER INITIAL DAY NOTE - NSICDXPASTMEDICALHX_GEN_ALL_CORE_FT
PAST MEDICAL HISTORY:  Breast cancer     History of ulcerative colitis     No pertinent past medical history

## 2022-08-30 NOTE — ED CDU PROVIDER DISPOSITION NOTE - CLINICAL COURSE
67 y F with pmhx of breast cancer x 2 presenting from pulmonology clinic with tachycardia.  For the past few days patient has been feeling unwell/weak spending her days in bed. Able to ambulate but does so weakly per . Decreased appetite but still drinking. Recently had back pain. Went to pulmonology clinic today and was found to be tachy so sent to the ED. Upon arrival found to be febrile but hadn't been aware of fevers prior to ED visit. Reports having two large drinks with liquor per day. Last drink was Sat (about 3 days ago). Smokes but has been unable to for the past 3 days due to feeling sick.   In ED, patient had laboratory significant for mild leukocytosis, mild hyponatremia, elevated procalcitonin and total Bili. RVP negative and Chest x ray showed  patchy opacity in the left lower lobe suspicious for pneumonia. Pt was started on Ceftriaxone and Azithromycin, sent to CDU for frequent reeval, vitals q 4hrs, iv abx. 67 y F with pmhx of breast cancer x 2 presenting from pulmonology clinic with tachycardia.  For the past few days patient has been feeling unwell/weak spending her days in bed. Able to ambulate but does so weakly per . Decreased appetite but still drinking. Recently had back pain. Went to pulmonology clinic today and was found to be tachy so sent to the ED. Upon arrival found to be febrile but hadn't been aware of fevers prior to ED visit. Reports having two large drinks with liquor per day. Last drink was Sat (about 3 days ago). Smokes but has been unable to for the past 3 days due to feeling sick.   In ED, patient had laboratory significant for mild leukocytosis, mild hyponatremia, elevated procalcitonin and total Bili. RVP negative and Chest x ray showed  patchy opacity in the left lower lobe suspicious for pneumonia. Pt was started on Ceftriaxone and Azithromycin, sent to CDU for frequent reeval, vitals q 4hrs, iv abx.  pt with PN and getting IV Abx - pt c/o fatigue and cough and discomfort in back - O2Sat 98.  During sleep decreased to 90-98%.  pt was tachy to 120 and still low 100s - admit for PN and IV ABx.

## 2022-08-30 NOTE — ED PROVIDER NOTE - PHYSICAL EXAMINATION
+tongue fasiculations when sticking tongue out, improved later in the evening, mild tremor of right hand

## 2022-08-31 DIAGNOSIS — R09.89 OTHER SPECIFIED SYMPTOMS AND SIGNS INVOLVING THE CIRCULATORY AND RESPIRATORY SYSTEMS: ICD-10-CM

## 2022-08-31 DIAGNOSIS — A41.9 SEPSIS, UNSPECIFIED ORGANISM: ICD-10-CM

## 2022-08-31 DIAGNOSIS — C50.919 MALIGNANT NEOPLASM OF UNSPECIFIED SITE OF UNSPECIFIED FEMALE BREAST: ICD-10-CM

## 2022-08-31 DIAGNOSIS — F41.9 ANXIETY DISORDER, UNSPECIFIED: ICD-10-CM

## 2022-08-31 DIAGNOSIS — Z29.9 ENCOUNTER FOR PROPHYLACTIC MEASURES, UNSPECIFIED: ICD-10-CM

## 2022-08-31 DIAGNOSIS — J18.9 PNEUMONIA, UNSPECIFIED ORGANISM: ICD-10-CM

## 2022-08-31 DIAGNOSIS — K51.90 ULCERATIVE COLITIS, UNSPECIFIED, WITHOUT COMPLICATIONS: ICD-10-CM

## 2022-08-31 DIAGNOSIS — J96.01 ACUTE RESPIRATORY FAILURE WITH HYPOXIA: ICD-10-CM

## 2022-08-31 LAB
ALBUMIN SERPL ELPH-MCNC: 3.4 G/DL — SIGNIFICANT CHANGE UP (ref 3.3–5)
ALP SERPL-CCNC: 65 U/L — SIGNIFICANT CHANGE UP (ref 40–120)
ALT FLD-CCNC: 20 U/L — SIGNIFICANT CHANGE UP (ref 10–45)
ANION GAP SERPL CALC-SCNC: 12 MMOL/L — SIGNIFICANT CHANGE UP (ref 5–17)
AST SERPL-CCNC: 36 U/L — SIGNIFICANT CHANGE UP (ref 10–40)
BASOPHILS # BLD AUTO: 0.02 K/UL — SIGNIFICANT CHANGE UP (ref 0–0.2)
BASOPHILS NFR BLD AUTO: 0.2 % — SIGNIFICANT CHANGE UP (ref 0–2)
BILIRUB SERPL-MCNC: 1.3 MG/DL — HIGH (ref 0.2–1.2)
BUN SERPL-MCNC: 20 MG/DL — SIGNIFICANT CHANGE UP (ref 7–23)
CALCIUM SERPL-MCNC: 9.4 MG/DL — SIGNIFICANT CHANGE UP (ref 8.4–10.5)
CHLORIDE SERPL-SCNC: 100 MMOL/L — SIGNIFICANT CHANGE UP (ref 96–108)
CO2 SERPL-SCNC: 25 MMOL/L — SIGNIFICANT CHANGE UP (ref 22–31)
CREAT SERPL-MCNC: 1.28 MG/DL — SIGNIFICANT CHANGE UP (ref 0.5–1.3)
CULTURE RESULTS: SIGNIFICANT CHANGE UP
EGFR: 46 ML/MIN/1.73M2 — LOW
EOSINOPHIL # BLD AUTO: 0 K/UL — SIGNIFICANT CHANGE UP (ref 0–0.5)
EOSINOPHIL NFR BLD AUTO: 0 % — SIGNIFICANT CHANGE UP (ref 0–6)
GLUCOSE SERPL-MCNC: 125 MG/DL — HIGH (ref 70–99)
HCT VFR BLD CALC: 39.9 % — SIGNIFICANT CHANGE UP (ref 34.5–45)
HGB BLD-MCNC: 13.4 G/DL — SIGNIFICANT CHANGE UP (ref 11.5–15.5)
IMM GRANULOCYTES NFR BLD AUTO: 0.5 % — SIGNIFICANT CHANGE UP (ref 0–1.5)
LYMPHOCYTES # BLD AUTO: 0.56 K/UL — LOW (ref 1–3.3)
LYMPHOCYTES # BLD AUTO: 6.5 % — LOW (ref 13–44)
MCHC RBC-ENTMCNC: 32.3 PG — SIGNIFICANT CHANGE UP (ref 27–34)
MCHC RBC-ENTMCNC: 33.6 GM/DL — SIGNIFICANT CHANGE UP (ref 32–36)
MCV RBC AUTO: 96.1 FL — SIGNIFICANT CHANGE UP (ref 80–100)
MONOCYTES # BLD AUTO: 0.35 K/UL — SIGNIFICANT CHANGE UP (ref 0–0.9)
MONOCYTES NFR BLD AUTO: 4.1 % — SIGNIFICANT CHANGE UP (ref 2–14)
NEUTROPHILS # BLD AUTO: 7.64 K/UL — HIGH (ref 1.8–7.4)
NEUTROPHILS NFR BLD AUTO: 88.7 % — HIGH (ref 43–77)
NRBC # BLD: 0 /100 WBCS — SIGNIFICANT CHANGE UP (ref 0–0)
PLATELET # BLD AUTO: 170 K/UL — SIGNIFICANT CHANGE UP (ref 150–400)
POTASSIUM SERPL-MCNC: 3.9 MMOL/L — SIGNIFICANT CHANGE UP (ref 3.5–5.3)
POTASSIUM SERPL-SCNC: 3.9 MMOL/L — SIGNIFICANT CHANGE UP (ref 3.5–5.3)
PROCALCITONIN SERPL-MCNC: 4.15 NG/ML — HIGH (ref 0.02–0.1)
PROT SERPL-MCNC: 7.1 G/DL — SIGNIFICANT CHANGE UP (ref 6–8.3)
RBC # BLD: 4.15 M/UL — SIGNIFICANT CHANGE UP (ref 3.8–5.2)
RBC # FLD: 15 % — HIGH (ref 10.3–14.5)
SODIUM SERPL-SCNC: 137 MMOL/L — SIGNIFICANT CHANGE UP (ref 135–145)
SPECIMEN SOURCE: SIGNIFICANT CHANGE UP
T3 SERPL-MCNC: 32 NG/DL — LOW (ref 80–200)
T4 AB SER-ACNC: 3.7 UG/DL — LOW (ref 4.6–12)
TSH SERPL-MCNC: 2.68 UIU/ML — SIGNIFICANT CHANGE UP (ref 0.27–4.2)
WBC # BLD: 8.61 K/UL — SIGNIFICANT CHANGE UP (ref 3.8–10.5)
WBC # FLD AUTO: 8.61 K/UL — SIGNIFICANT CHANGE UP (ref 3.8–10.5)

## 2022-08-31 PROCEDURE — 99217: CPT | Mod: CS

## 2022-08-31 PROCEDURE — 99223 1ST HOSP IP/OBS HIGH 75: CPT

## 2022-08-31 RX ORDER — ACETAMINOPHEN 500 MG
975 TABLET ORAL ONCE
Refills: 0 | Status: COMPLETED | OUTPATIENT
Start: 2022-08-31 | End: 2022-08-31

## 2022-08-31 RX ORDER — DIAZEPAM 5 MG
1 TABLET ORAL
Qty: 0 | Refills: 0 | DISCHARGE

## 2022-08-31 RX ORDER — MESALAMINE 400 MG
2 TABLET, DELAYED RELEASE (ENTERIC COATED) ORAL
Qty: 0 | Refills: 0 | DISCHARGE

## 2022-08-31 RX ORDER — IBUPROFEN 200 MG
400 TABLET ORAL EVERY 8 HOURS
Refills: 0 | Status: DISCONTINUED | OUTPATIENT
Start: 2022-08-31 | End: 2022-09-01

## 2022-08-31 RX ORDER — ENOXAPARIN SODIUM 100 MG/ML
40 INJECTION SUBCUTANEOUS EVERY 24 HOURS
Refills: 0 | Status: DISCONTINUED | OUTPATIENT
Start: 2022-08-31 | End: 2022-09-05

## 2022-08-31 RX ORDER — LIDOCAINE 4 G/100G
1 CREAM TOPICAL DAILY
Refills: 0 | Status: DISCONTINUED | OUTPATIENT
Start: 2022-08-31 | End: 2022-09-05

## 2022-08-31 RX ORDER — MESALAMINE 400 MG
2.4 TABLET, DELAYED RELEASE (ENTERIC COATED) ORAL DAILY
Refills: 0 | Status: DISCONTINUED | OUTPATIENT
Start: 2022-08-31 | End: 2022-09-05

## 2022-08-31 RX ORDER — ACETAMINOPHEN 500 MG
650 TABLET ORAL EVERY 6 HOURS
Refills: 0 | Status: DISCONTINUED | OUTPATIENT
Start: 2022-08-31 | End: 2022-09-01

## 2022-08-31 RX ORDER — ONDANSETRON 8 MG/1
4 TABLET, FILM COATED ORAL EVERY 8 HOURS
Refills: 0 | Status: DISCONTINUED | OUTPATIENT
Start: 2022-08-31 | End: 2022-09-05

## 2022-08-31 RX ORDER — DIAZEPAM 5 MG
10 TABLET ORAL DAILY
Refills: 0 | Status: DISCONTINUED | OUTPATIENT
Start: 2022-08-31 | End: 2022-09-05

## 2022-08-31 RX ORDER — LANOLIN ALCOHOL/MO/W.PET/CERES
3 CREAM (GRAM) TOPICAL AT BEDTIME
Refills: 0 | Status: DISCONTINUED | OUTPATIENT
Start: 2022-08-31 | End: 2022-09-05

## 2022-08-31 RX ORDER — ANASTROZOLE 1 MG/1
1 TABLET ORAL
Qty: 0 | Refills: 0 | DISCHARGE

## 2022-08-31 RX ADMIN — CEFTRIAXONE 100 MILLIGRAM(S): 500 INJECTION, POWDER, FOR SOLUTION INTRAMUSCULAR; INTRAVENOUS at 20:13

## 2022-08-31 RX ADMIN — Medication 975 MILLIGRAM(S): at 12:14

## 2022-08-31 RX ADMIN — SODIUM CHLORIDE 150 MILLILITER(S): 9 INJECTION, SOLUTION INTRAVENOUS at 06:31

## 2022-08-31 RX ADMIN — Medication 600 MILLIGRAM(S): at 00:31

## 2022-08-31 RX ADMIN — SODIUM CHLORIDE 50 MILLILITER(S): 9 INJECTION, SOLUTION INTRAVENOUS at 11:00

## 2022-08-31 RX ADMIN — Medication 650 MILLIGRAM(S): at 23:23

## 2022-08-31 RX ADMIN — Medication 650 MILLIGRAM(S): at 23:55

## 2022-08-31 RX ADMIN — SODIUM CHLORIDE 50 MILLILITER(S): 9 INJECTION, SOLUTION INTRAVENOUS at 20:44

## 2022-08-31 RX ADMIN — ANASTROZOLE 1 MILLIGRAM(S): 1 TABLET ORAL at 12:14

## 2022-08-31 RX ADMIN — AZITHROMYCIN 255 MILLIGRAM(S): 500 TABLET, FILM COATED ORAL at 21:37

## 2022-08-31 NOTE — H&P ADULT - NSHPPHYSICALEXAM_GEN_ALL_CORE
Vital Signs Last 24 Hrs  T(C): 37.8 (31 Aug 2022 12:02), Max: 39.4 (30 Aug 2022 17:23)  T(F): 100 (31 Aug 2022 12:02), Max: 102.9 (30 Aug 2022 17:23)  HR: 99 (31 Aug 2022 12:02) (89 - 129)  BP: 120/80 (31 Aug 2022 12:02) (115/81 - 163/107)  BP(mean): 93 (31 Aug 2022 12:02) (93 - 100)  RR: 18 (31 Aug 2022 12:02) (18 - 20)  SpO2: 96% (31 Aug 2022 12:02) (89% - 98%)    Parameters below as of 31 Aug 2022 12:02  Patient On (Oxygen Delivery Method): room air    PHYSICAL EXAM:  GENERAL: NAD, well-developed  HEAD:  Atraumatic, normocephalic  EYES: EOMI, conjunctiva and sclera clear  NECK: Supple, no JVD  CHEST/LUNG: L sided crackles, otherwise CTA, on 2LNC   HEART: tachycardic, no murmurs, no LE edema   ABDOMEN: Soft, nontender, nondistended; bowel sounds present  EXTREMITIES:  2+ Peripheral Pulses, no clubbing, cyanosis  PSYCH: AAOx3, calm affect, not anxious  NEUROLOGY: non-focal, moving all extremities equally, sensation grossly intact   SKIN: No rashes or lesions  MUSCULOSKELETAL: no joint swelling or tenderness

## 2022-08-31 NOTE — H&P ADULT - HISTORY OF PRESENT ILLNESS
67F with PMH breast cancer (L sided, in 2000 and 2019, s/p lumpectomies x 2), UC on mesalamine, current smoker p/w tachycardia noted at outpt office. Per pt, started feeling unwell mid-day Sat 8/27, feeling very weak and aftigued and noted some mid  back pain. Pt states she spent most of the weekend sleeping and in bed, endorses generalized chills but no fevers at home, endorsing very poor PO intake, only taking fluids; denies any syncope, ad pain, no n/v, no diarrhea, no dysuria or hematuria.. On Monday she tested negative for COVID. On Tuesday, she had routine pulmonology appt and was found to be tachycardic to 120s and referred to ED. In the ED, pt started noting dry cough and started having fevers and chills.     Pt is long time smoker, currently 5-7 cigarettes per day  Drinks 2 large mixed liquor drinks every day - last drink Friday prior to onset of symptoms, denies any tremors, anxiety, n/v/abd pain.     In ED, found with fevers, tachycardia and hypoxia; s/p ceftriaxone and azithromycin Admitted to CDU for PNA. In CDU, pt noted with ongoing hypoxia and admitted to medicine.

## 2022-08-31 NOTE — ED ADULT NURSE REASSESSMENT NOTE - NSIMPLEMENTINTERV_GEN_ALL_ED
Implemented All Fall with Harm Risk Interventions:  [place] to call system. Call bell, personal items and telephone within reach. Instruct patient to call for assistance. Room bathroom lighting operational. Non-slip footwear when patient is off stretcher. Physically safe environment: no spills, clutter or unnecessary equipment. Stretcher in lowest position, wheels locked, appropriate side rails in place. Provide visual cue, wrist band, yellow gown, etc. Monitor gait and stability. Monitor for mental status changes and reorient to person, place, and time. Review medications for side effects contributing to fall risk. Reinforce activity limits and safety measures with patient and family. Provide visual clues: red socks. There are no Wet Read(s) to document. There is 1 Wet Read(s) to document.

## 2022-08-31 NOTE — H&P ADULT - PROBLEM SELECTOR PLAN 1
hypoxic to 89%RA overnight, now saturating >95% on 2LNC; 2/2 LLL PNA  - treat PNA as below   - c/w supplemental O2 and wean as tolerated   - if hypoxia not improving, can consider CTA to r/o PE

## 2022-08-31 NOTE — H&P ADULT - PROBLEM SELECTOR PLAN 2
pt with fever and tachycardia on admission, with CXR showing LL opacity c/w PNA. UA/RVP both neg.    - c/w ceftriaxone and azithromycin for CAP  - check legionella  - f/u Bcx data   - lactate normal on admission   - c/w supportive care

## 2022-08-31 NOTE — ED CDU PROVIDER SUBSEQUENT DAY NOTE - HISTORY
CDU PROGRESS NOTE PA LEXUS: Pt hypoxic to 89-90% on RA. Lungs CTAB, +mild tachypnea, no signs of respiratory distress. Pt placed on 2L O2 via NC and saturation noah to 95-96%. Will continue IV abx and monitor overnight.

## 2022-08-31 NOTE — H&P ADULT - ASSESSMENT
67F with PMH breast cancer (L sided, in 2000 and 2019, s/p lumpectomies x 2), UC on mesalamine, current smoker, coming in for 4 days of generalized weakness/fatigue, found to be tachycardic at outpt office; in ED noted to be febrile, tachycardic on hypoxic with CXR showing LLL opacity, a/w sepsis and hypoxic respiratory failure 2/2 LLL PNA.

## 2022-08-31 NOTE — ED ADULT NURSE REASSESSMENT NOTE - NS ED NURSE REASSESS COMMENT FT1
patient to US
Pt received from FELISA Villarreal. Pt oriented to CDU & plan of care was discussed. Pt A&O x 4. Pt in CDU for frequent reeval, vitals q 4hrs, iv abx. Pt denies any chest pain, SOB, dizziness, palpitations, respiratory distress. Pt lungs clear on ascultation. Pt on continuos pulse ox saturation in 93-94%. V/S stable, pt febrile, medicated as per MAR,  IV in place, patent and free of signs of infiltration. Pt resting in bed. Safety & comfort measures maintained. Call bell in reach. Will continue to monitor.
07.00 Am Received the Pt from  FELISA Malik . Pt is Observed for Pneumonia   . Received the Pt A&OX 4 obeys commands Courtney N/V/D fever chills cp SOB   Comfort care & safety measures continued  IV site looks clean & dry no signs of infiltration noted pt denies  pain IV site .  Pt is advised to call for help  call bell with in the reach pt verbalized the understanding .  pending CDU  MD ramirez . GCS 15/15 A&OX 4 PERRLA  size 3 Strong upper & lower extremities steady gait   No facial droop  No Hand Leg drop denies numbness tingling Continue to monitor. Pt is hypoxic with  movement 88%  pt is admitted awaiting fo the bed

## 2022-08-31 NOTE — PATIENT PROFILE ADULT - FALL HARM RISK - UNIVERSAL INTERVENTIONS
Bed in lowest position, wheels locked, appropriate side rails in place/Call bell, personal items and telephone in reach/Instruct patient to call for assistance before getting out of bed or chair/Non-slip footwear when patient is out of bed/[place] to call system/Physically safe environment - no spills, clutter or unnecessary equipment/Purposeful Proactive Rounding/Room/bathroom lighting operational, light cord in reach

## 2022-08-31 NOTE — ED CDU PROVIDER SUBSEQUENT DAY NOTE - MEDICAL DECISION MAKING DETAILS
Attn - PN with fever, tachycardia - IV Azithromycin and Ceftriaxone - reassess, monitor HR and O2Sat.

## 2022-08-31 NOTE — H&P ADULT - NSHPREVIEWOFSYSTEMS_GEN_ALL_CORE
REVIEW OF SYSTEMS:    CONSTITUTIONAL: +weakness, +fevers, +chills  EYES/ENT: No visual changes;  no throat pain   NECK: No pain or stiffness  RESPIRATORY: +cough, no wheezing +shortness of breath  CARDIOVASCULAR: no chest pain no palpitations  GASTROINTESTINAL: no nausea, vomiting, no abdominal pain, no BRBPR  GENITOURINARY: no polyuria, no dysuria  NEUROLOGICAL: no numbness, +headaches, no confusion   MUSCULOSKELETAL: +back pain, no weakness   SKIN: No itching, burning, rashes, or lesions   PSYCH: no anxiety, depression  HEME: no gum bleeding, no bruising

## 2022-08-31 NOTE — ED CDU PROVIDER SUBSEQUENT DAY NOTE - PROGRESS NOTE DETAILS
CDU PROGRESS NOTE SHAYY ALBERTS: am labs w/ worsening Procalcitonin from 3.35--4.15. In setting of hypoxia w/ PNA, Pt will likely benefit from admission. Will discuss w/ am team. pt with PN and getting IV Abx - pt c/o fatigue and cough and discomfort in back - O2Sat 98.  During sleep decreased to 90-98%.  pt was tachy to 120 and still low 100s - admit for PN and IV ABx.

## 2022-08-31 NOTE — H&P ADULT - NSHPLABSRESULTS_GEN_ALL_CORE
Labs, imaging and EKG personally reviewed and interpreted by me.   notable for improving leukocytosis, stable Hb 13.4, normal lytes and Cr. Tbili 2-->1.3  Imaging personally reviewed and interpreted by me - LLL hazy opacity   EKG personally reviewed and interpreted by me -                           13.4   8.61  )-----------( 170      ( 31 Aug 2022 05:43 )             39.9         137  |  100  |  20  ----------------------------<  125<H>  3.9   |  25  |  1.28    Ca    9.4      31 Aug 2022 05:43    TPro  7.1  /  Alb  3.4  /  TBili  1.3<H>  /  DBili  x   /  AST  36  /  ALT  20  /  AlkPhos  65          PT/INR - ( 30 Aug 2022 18:54 )   PT: 12.5 sec;   INR: 1.09 ratio         PTT - ( 30 Aug 2022 18:54 )  PTT:32.1 sec    Urinalysis Basic - ( 30 Aug 2022 21:14 )    Color: Yellow / Appearance: Clear / S.013 / pH: x  Gluc: x / Ketone: Trace  / Bili: Negative / Urobili: Negative   Blood: x / Protein: 100 / Nitrite: Negative   Leuk Esterase: Negative / RBC: 1 /hpf / WBC 3 /HPF   Sq Epi: x / Non Sq Epi: 2 /hpf / Bacteria: Negative      < from: Xray Chest 2 Views PA/Lat (22 @ 18:56) >      IMPRESSION:    Left lower lobe patchy opacity, concerning for pneumonia.    < end of copied text >

## 2022-09-01 DIAGNOSIS — G44.209 TENSION-TYPE HEADACHE, UNSPECIFIED, NOT INTRACTABLE: ICD-10-CM

## 2022-09-01 LAB
ANION GAP SERPL CALC-SCNC: 13 MMOL/L — SIGNIFICANT CHANGE UP (ref 5–17)
BUN SERPL-MCNC: 14 MG/DL — SIGNIFICANT CHANGE UP (ref 7–23)
CALCIUM SERPL-MCNC: 9.1 MG/DL — SIGNIFICANT CHANGE UP (ref 8.4–10.5)
CHLORIDE SERPL-SCNC: 96 MMOL/L — SIGNIFICANT CHANGE UP (ref 96–108)
CO2 SERPL-SCNC: 24 MMOL/L — SIGNIFICANT CHANGE UP (ref 22–31)
CREAT SERPL-MCNC: 0.88 MG/DL — SIGNIFICANT CHANGE UP (ref 0.5–1.3)
EGFR: 72 ML/MIN/1.73M2 — SIGNIFICANT CHANGE UP
GLUCOSE SERPL-MCNC: 93 MG/DL — SIGNIFICANT CHANGE UP (ref 70–99)
HCT VFR BLD CALC: 35.7 % — SIGNIFICANT CHANGE UP (ref 34.5–45)
HCT VFR BLD CALC: 38.8 % — SIGNIFICANT CHANGE UP (ref 34.5–45)
HCV AB S/CO SERPL IA: 0.12 S/CO — SIGNIFICANT CHANGE UP (ref 0–0.99)
HCV AB SERPL-IMP: SIGNIFICANT CHANGE UP
HGB BLD-MCNC: 12.3 G/DL — SIGNIFICANT CHANGE UP (ref 11.5–15.5)
HGB BLD-MCNC: 13.1 G/DL — SIGNIFICANT CHANGE UP (ref 11.5–15.5)
LACTATE SERPL-SCNC: 1 MMOL/L — SIGNIFICANT CHANGE UP (ref 0.5–2)
LEGIONELLA AG UR QL: NEGATIVE — SIGNIFICANT CHANGE UP
MCHC RBC-ENTMCNC: 32 PG — SIGNIFICANT CHANGE UP (ref 27–34)
MCHC RBC-ENTMCNC: 32.3 PG — SIGNIFICANT CHANGE UP (ref 27–34)
MCHC RBC-ENTMCNC: 33.8 GM/DL — SIGNIFICANT CHANGE UP (ref 32–36)
MCHC RBC-ENTMCNC: 34.5 GM/DL — SIGNIFICANT CHANGE UP (ref 32–36)
MCV RBC AUTO: 93.7 FL — SIGNIFICANT CHANGE UP (ref 80–100)
MCV RBC AUTO: 94.9 FL — SIGNIFICANT CHANGE UP (ref 80–100)
NRBC # BLD: 0 /100 WBCS — SIGNIFICANT CHANGE UP (ref 0–0)
NRBC # BLD: 0 /100 WBCS — SIGNIFICANT CHANGE UP (ref 0–0)
PLATELET # BLD AUTO: 162 K/UL — SIGNIFICANT CHANGE UP (ref 150–400)
PLATELET # BLD AUTO: 166 K/UL — SIGNIFICANT CHANGE UP (ref 150–400)
POTASSIUM SERPL-MCNC: 3.9 MMOL/L — SIGNIFICANT CHANGE UP (ref 3.5–5.3)
POTASSIUM SERPL-SCNC: 3.9 MMOL/L — SIGNIFICANT CHANGE UP (ref 3.5–5.3)
RBC # BLD: 3.81 M/UL — SIGNIFICANT CHANGE UP (ref 3.8–5.2)
RBC # BLD: 4.09 M/UL — SIGNIFICANT CHANGE UP (ref 3.8–5.2)
RBC # FLD: 15.1 % — HIGH (ref 10.3–14.5)
RBC # FLD: 15.1 % — HIGH (ref 10.3–14.5)
SODIUM SERPL-SCNC: 133 MMOL/L — LOW (ref 135–145)
WBC # BLD: 6.02 K/UL — SIGNIFICANT CHANGE UP (ref 3.8–10.5)
WBC # BLD: 6.39 K/UL — SIGNIFICANT CHANGE UP (ref 3.8–10.5)
WBC # FLD AUTO: 6.02 K/UL — SIGNIFICANT CHANGE UP (ref 3.8–10.5)
WBC # FLD AUTO: 6.39 K/UL — SIGNIFICANT CHANGE UP (ref 3.8–10.5)

## 2022-09-01 PROCEDURE — 99232 SBSQ HOSP IP/OBS MODERATE 35: CPT | Mod: GC

## 2022-09-01 PROCEDURE — 99232 SBSQ HOSP IP/OBS MODERATE 35: CPT | Mod: FS

## 2022-09-01 RX ORDER — ACETAMINOPHEN 500 MG
1000 TABLET ORAL ONCE
Refills: 0 | Status: COMPLETED | OUTPATIENT
Start: 2022-09-01 | End: 2022-09-01

## 2022-09-01 RX ORDER — ACETAMINOPHEN 500 MG
975 TABLET ORAL EVERY 8 HOURS
Refills: 0 | Status: DISCONTINUED | OUTPATIENT
Start: 2022-09-01 | End: 2022-09-02

## 2022-09-01 RX ORDER — NICOTINE POLACRILEX 2 MG
1 GUM BUCCAL ONCE
Refills: 0 | Status: COMPLETED | OUTPATIENT
Start: 2022-09-01 | End: 2022-09-01

## 2022-09-01 RX ADMIN — Medication 975 MILLIGRAM(S): at 13:11

## 2022-09-01 RX ADMIN — Medication 975 MILLIGRAM(S): at 14:11

## 2022-09-01 RX ADMIN — Medication 650 MILLIGRAM(S): at 06:57

## 2022-09-01 RX ADMIN — Medication 100 MILLIGRAM(S): at 13:11

## 2022-09-01 RX ADMIN — Medication 100 MILLIGRAM(S): at 19:46

## 2022-09-01 RX ADMIN — Medication 10 MILLIGRAM(S): at 19:45

## 2022-09-01 RX ADMIN — Medication 650 MILLIGRAM(S): at 05:40

## 2022-09-01 RX ADMIN — Medication 400 MILLIGRAM(S): at 20:53

## 2022-09-01 RX ADMIN — CEFTRIAXONE 100 MILLIGRAM(S): 500 INJECTION, POWDER, FOR SOLUTION INTRAMUSCULAR; INTRAVENOUS at 19:41

## 2022-09-01 NOTE — PROGRESS NOTE ADULT - PROBLEM SELECTOR PLAN 2
pt with fever and tachycardia on admission, with CXR showing LL opacity c/w PNA. UA/RVP both neg.    - c/w ceftriaxone and azithromycin for CAP  - check legionella  - f/u Bcx data   - lactate normal on admission   - c/w supportive care - c/w ceftriaxone and azithromycin for CAP  - Bcx NTD, legionella neg  - c/w O2 supplementation as above  - trend fever curve, WBC - c/w ceftriaxone and azithromycin for CAP  - Bcx NTD, legionella neg  - c/w O2 supplementation as above  - trend fever curve, WBC, procal  - once weaned off O2, consider switching to PO abx

## 2022-09-01 NOTE — PROGRESS NOTE ADULT - PROBLEM SELECTOR PLAN 3
treatment as above Headache since coming to ED, no red flag symptoms, no temporal tenderness, no nuchal rigidity  - acetaminophen prn

## 2022-09-01 NOTE — PROGRESS NOTE ADULT - SUBJECTIVE AND OBJECTIVE BOX
Patient is a 67y old  Female who presents with a chief complaint of tachycardia (31 Aug 2022 12:42)      SUBJECTIVE / OVERNIGHT EVENTS:    MEDICATIONS  (STANDING):  anastrozole 1 milliGRAM(s) Oral daily  azithromycin  IVPB 500 milliGRAM(s) IV Intermittent every 24 hours  cefTRIAXone   IVPB 1000 milliGRAM(s) IV Intermittent every 24 hours  enoxaparin Injectable 40 milliGRAM(s) SubCutaneous every 24 hours  lactated ringers. 1000 milliLiter(s) (50 mL/Hr) IV Continuous <Continuous>  lidocaine   4% Patch 1 Patch Transdermal daily  mesalamine DR (24-Hour) Tablet 2.4 Gram(s) Oral daily  nicotine -   7 mG/24Hr(s) Patch 1 Patch Transdermal once    MEDICATIONS  (PRN):  acetaminophen     Tablet .. 650 milliGRAM(s) Oral every 6 hours PRN Temp greater or equal to 38C (100.4F), Mild Pain (1 - 3)  aluminum hydroxide/magnesium hydroxide/simethicone Suspension 30 milliLiter(s) Oral every 4 hours PRN Dyspepsia  diazepam    Tablet 10 milliGRAM(s) Oral daily PRN anxiety  ibuprofen  Tablet. 400 milliGRAM(s) Oral every 8 hours PRN Moderate Pain (4 - 6), Severe Pain (7 - 10)  melatonin 3 milliGRAM(s) Oral at bedtime PRN Insomnia  ondansetron Injectable 4 milliGRAM(s) IV Push every 8 hours PRN Nausea and/or Vomiting      Vital Signs Last 24 Hrs  T(C): 36.9 (01 Sep 2022 09:44), Max: 37.8 (31 Aug 2022 12:02)  T(F): 98.4 (01 Sep 2022 09:44), Max: 100 (31 Aug 2022 12:02)  HR: 87 (01 Sep 2022 09:44) (83 - 109)  BP: 113/81 (01 Sep 2022 09:44) (111/75 - 166/99)  BP(mean): 100 (31 Aug 2022 18:21) (87 - 100)  RR: 18 (01 Sep 2022 09:44) (17 - 18)  SpO2: 97% (01 Sep 2022 09:44) (95% - 98%)    Parameters below as of 01 Sep 2022 09:44  Patient On (Oxygen Delivery Method): nasal cannula  O2 Flow (L/min): 2    CAPILLARY BLOOD GLUCOSE        I&O's Summary    01 Sep 2022 07:01  -  01 Sep 2022 10:45  --------------------------------------------------------  IN: 480 mL / OUT: 0 mL / NET: 480 mL        PHYSICAL EXAM:  GENERAL: NAD, well-developed  HEAD:  Atraumatic, Normocephalic  EYES: EOMI, PERRLA, conjunctiva and sclera clear  NECK: Supple, No JVD  CHEST/LUNG: Clear to auscultation bilaterally; No wheeze  HEART: Regular rate and rhythm; No murmurs, rubs, or gallops  ABDOMEN: Soft, Nontender, Nondistended; Bowel sounds present  EXTREMITIES:  2+ Peripheral Pulses, No clubbing, cyanosis, or edema  PSYCH: AAOx3  NEUROLOGY: non-focal  SKIN: No rashes or lesions    LABS:                        13.1   6.39  )-----------( 162      ( 01 Sep 2022 07:24 )             38.8         133<L>  |  96  |  14  ----------------------------<  93  3.9   |  24  |  0.88    Ca    9.1      01 Sep 2022 07:24    TPro  7.1  /  Alb  3.4  /  TBili  1.3<H>  /  DBili  x   /  AST  36  /  ALT  20  /  AlkPhos  65  08-31    PT/INR - ( 30 Aug 2022 18:54 )   PT: 12.5 sec;   INR: 1.09 ratio         PTT - ( 30 Aug 2022 18:54 )  PTT:32.1 sec      Urinalysis Basic - ( 30 Aug 2022 21:14 )    Color: Yellow / Appearance: Clear / S.013 / pH: x  Gluc: x / Ketone: Trace  / Bili: Negative / Urobili: Negative   Blood: x / Protein: 100 / Nitrite: Negative   Leuk Esterase: Negative / RBC: 1 /hpf / WBC 3 /HPF   Sq Epi: x / Non Sq Epi: 2 /hpf / Bacteria: Negative        RADIOLOGY & ADDITIONAL TESTS:    Imaging Personally Reviewed:    Consultant(s) Notes Reviewed:      Care Discussed with Consultants/Other Providers:   Patient is a 67y old  Female who presents with a chief complaint of tachycardia (31 Aug 2022 12:42)      SUBJECTIVE / OVERNIGHT EVENTS:  GRIFFIN. Examined in AM at bedside. Patient anxious to go home once safe as patient has event planned . Complaining of pain at IV site only during abx infusion. Also complaining of headache since coming to ED, location moves, no photo/phonophobia, no n/v or vision changes    MEDICATIONS  (STANDING):  anastrozole 1 milliGRAM(s) Oral daily  azithromycin  IVPB 500 milliGRAM(s) IV Intermittent every 24 hours  cefTRIAXone   IVPB 1000 milliGRAM(s) IV Intermittent every 24 hours  enoxaparin Injectable 40 milliGRAM(s) SubCutaneous every 24 hours  lactated ringers. 1000 milliLiter(s) (50 mL/Hr) IV Continuous <Continuous>  lidocaine   4% Patch 1 Patch Transdermal daily  mesalamine DR (24-Hour) Tablet 2.4 Gram(s) Oral daily  nicotine -   7 mG/24Hr(s) Patch 1 Patch Transdermal once    MEDICATIONS  (PRN):  acetaminophen     Tablet .. 650 milliGRAM(s) Oral every 6 hours PRN Temp greater or equal to 38C (100.4F), Mild Pain (1 - 3)  aluminum hydroxide/magnesium hydroxide/simethicone Suspension 30 milliLiter(s) Oral every 4 hours PRN Dyspepsia  diazepam    Tablet 10 milliGRAM(s) Oral daily PRN anxiety  ibuprofen  Tablet. 400 milliGRAM(s) Oral every 8 hours PRN Moderate Pain (4 - 6), Severe Pain (7 - 10)  melatonin 3 milliGRAM(s) Oral at bedtime PRN Insomnia  ondansetron Injectable 4 milliGRAM(s) IV Push every 8 hours PRN Nausea and/or Vomiting      Vital Signs Last 24 Hrs  T(C): 36.9 (01 Sep 2022 09:44), Max: 37.8 (31 Aug 2022 12:02)  T(F): 98.4 (01 Sep 2022 09:44), Max: 100 (31 Aug 2022 12:02)  HR: 87 (01 Sep 2022 09:44) (83 - 109)  BP: 113/81 (01 Sep 2022 09:44) (111/75 - 166/99)  BP(mean): 100 (31 Aug 2022 18:21) (87 - 100)  RR: 18 (01 Sep 2022 09:44) (17 - 18)  SpO2: 97% (01 Sep 2022 09:44) (95% - 98%)    Parameters below as of 01 Sep 2022 09:44  Patient On (Oxygen Delivery Method): nasal cannula  O2 Flow (L/min): 2    CAPILLARY BLOOD GLUCOSE        I&O's Summary    01 Sep 2022 07:01  -  01 Sep 2022 10:45  --------------------------------------------------------  IN: 480 mL / OUT: 0 mL / NET: 480 mL        PHYSICAL EXAM:  GENERAL: NAD, well-developed  HEAD:  Atraumatic, Normocephalic. no temporal tenderness  EYES: EOMI, PERRLA, conjunctiva and sclera clear  NECK: Supple, No JVD  CHEST/LUNG: LLL Decreased BS  HEART: Regular rate and rhythm; No murmurs, rubs, or gallops  ABDOMEN: Soft, Nontender, Nondistended; Bowel sounds present  EXTREMITIES:  2+ Peripheral Pulses, No clubbing, cyanosis, or edema  PSYCH: AAOx3  NEUROLOGY: non-focal  SKIN: No rashes or lesions    LABS:                        13.1   6.39  )-----------( 162      ( 01 Sep 2022 07:24 )             38.8     09-01    133<L>  |  96  |  14  ----------------------------<  93  3.9   |  24  |  0.88    Ca    9.1      01 Sep 2022 07:24    TPro  7.1  /  Alb  3.4  /  TBili  1.3<H>  /  DBili  x   /  AST  36  /  ALT  20  /  AlkPhos  65  08-31    PT/INR - ( 30 Aug 2022 18:54 )   PT: 12.5 sec;   INR: 1.09 ratio         PTT - ( 30 Aug 2022 18:54 )  PTT:32.1 sec      Urinalysis Basic - ( 30 Aug 2022 21:14 )    Color: Yellow / Appearance: Clear / S.013 / pH: x  Gluc: x / Ketone: Trace  / Bili: Negative / Urobili: Negative   Blood: x / Protein: 100 / Nitrite: Negative   Leuk Esterase: Negative / RBC: 1 /hpf / WBC 3 /HPF   Sq Epi: x / Non Sq Epi: 2 /hpf / Bacteria: Negative        RADIOLOGY & ADDITIONAL TESTS:    Imaging Personally Reviewed:    Consultant(s) Notes Reviewed:      Care Discussed with Consultants/Other Providers:

## 2022-09-01 NOTE — PROGRESS NOTE ADULT - ASSESSMENT
67F with PMH breast cancer (L sided, in 2000 and 2019, s/p lumpectomies x 2), UC on mesalamine, current smoker, coming in for 4 days of generalized weakness/fatigue, found to be tachycardic at outpt office; in ED noted to be febrile, tachycardic on hypoxic with CXR showing LLL opacity, a/w sepsis and hypoxic respiratory failure 2/2 LLL PNA.  67F with PMH breast cancer (L sided, in 2000 and 2019, s/p lumpectomies x 2), UC on mesalamine, current smoker, sent to ED from pulm office for tachycardia, found to have LLL consolidation, admitted for hypoxic respiratory failure 2/2 PNA.

## 2022-09-01 NOTE — PROGRESS NOTE ADULT - PROBLEM SELECTOR PLAN 7
lovenox for VTE ppx  fall, aspiration precautions   regular diet pt with fever and tachycardia on admission, with CXR showing LL opacity c/w PNA. NOW RESOLVED

## 2022-09-01 NOTE — PROGRESS NOTE ADULT - PROBLEM SELECTOR PLAN 1
hypoxic to 89%RA overnight, now saturating >95% on 2LNC; 2/2 LLL PNA  - treat PNA as below   - c/w supplemental O2 and wean as tolerated   - if hypoxia not improving, can consider CTA to r/o PE hypoxic to 89%RA in ED 2/2 LLL PNA  - treat PNA as below   - c/w supplemental O2 and wean as tolerated - currently on 2L NC   - if hypoxia not improving, can consider CTA to r/o PE

## 2022-09-02 LAB
ANION GAP SERPL CALC-SCNC: 12 MMOL/L — SIGNIFICANT CHANGE UP (ref 5–17)
APPEARANCE UR: CLEAR — SIGNIFICANT CHANGE UP
BACTERIA # UR AUTO: NEGATIVE — SIGNIFICANT CHANGE UP
BILIRUB SERPL-MCNC: 0.7 MG/DL — SIGNIFICANT CHANGE UP (ref 0.2–1.2)
BILIRUB UR-MCNC: NEGATIVE — SIGNIFICANT CHANGE UP
BUN SERPL-MCNC: 15 MG/DL — SIGNIFICANT CHANGE UP (ref 7–23)
CALCIUM SERPL-MCNC: 9.1 MG/DL — SIGNIFICANT CHANGE UP (ref 8.4–10.5)
CHLORIDE SERPL-SCNC: 95 MMOL/L — LOW (ref 96–108)
CO2 SERPL-SCNC: 25 MMOL/L — SIGNIFICANT CHANGE UP (ref 22–31)
COLOR SPEC: SIGNIFICANT CHANGE UP
CREAT SERPL-MCNC: 0.9 MG/DL — SIGNIFICANT CHANGE UP (ref 0.5–1.3)
CREAT SERPL-MCNC: 0.91 MG/DL — SIGNIFICANT CHANGE UP (ref 0.5–1.3)
DIFF PNL FLD: NEGATIVE — SIGNIFICANT CHANGE UP
EGFR: 69 ML/MIN/1.73M2 — SIGNIFICANT CHANGE UP
EGFR: 70 ML/MIN/1.73M2 — SIGNIFICANT CHANGE UP
EPI CELLS # UR: 14 /HPF — HIGH
GLUCOSE SERPL-MCNC: 92 MG/DL — SIGNIFICANT CHANGE UP (ref 70–99)
GLUCOSE UR QL: NEGATIVE — SIGNIFICANT CHANGE UP
GRAM STN FLD: SIGNIFICANT CHANGE UP
HCT VFR BLD CALC: 38.4 % — SIGNIFICANT CHANGE UP (ref 34.5–45)
HGB BLD-MCNC: 12.7 G/DL — SIGNIFICANT CHANGE UP (ref 11.5–15.5)
HYALINE CASTS # UR AUTO: 0 /LPF — SIGNIFICANT CHANGE UP (ref 0–2)
INR BLD: 1.06 RATIO — SIGNIFICANT CHANGE UP (ref 0.88–1.16)
KETONES UR-MCNC: SIGNIFICANT CHANGE UP
LEUKOCYTE ESTERASE UR-ACNC: NEGATIVE — SIGNIFICANT CHANGE UP
MCHC RBC-ENTMCNC: 31.2 PG — SIGNIFICANT CHANGE UP (ref 27–34)
MCHC RBC-ENTMCNC: 33.1 GM/DL — SIGNIFICANT CHANGE UP (ref 32–36)
MCV RBC AUTO: 94.3 FL — SIGNIFICANT CHANGE UP (ref 80–100)
MELD SCORE WITH DIALYSIS: 24 POINTS — SIGNIFICANT CHANGE UP
MELD SCORE WITHOUT DIALYSIS: 7 POINTS — SIGNIFICANT CHANGE UP
NITRITE UR-MCNC: NEGATIVE — SIGNIFICANT CHANGE UP
NRBC # BLD: 0 /100 WBCS — SIGNIFICANT CHANGE UP (ref 0–0)
PH UR: 6.5 — SIGNIFICANT CHANGE UP (ref 5–8)
PLATELET # BLD AUTO: 175 K/UL — SIGNIFICANT CHANGE UP (ref 150–400)
POTASSIUM SERPL-MCNC: 3.9 MMOL/L — SIGNIFICANT CHANGE UP (ref 3.5–5.3)
POTASSIUM SERPL-SCNC: 3.9 MMOL/L — SIGNIFICANT CHANGE UP (ref 3.5–5.3)
PROCALCITONIN SERPL-MCNC: 1.65 NG/ML — HIGH (ref 0.02–0.1)
PROT UR-MCNC: ABNORMAL
PROTHROM AB SERPL-ACNC: 12.3 SEC — SIGNIFICANT CHANGE UP (ref 10.5–13.4)
RAPID RVP RESULT: SIGNIFICANT CHANGE UP
RBC # BLD: 4.07 M/UL — SIGNIFICANT CHANGE UP (ref 3.8–5.2)
RBC # FLD: 15.3 % — HIGH (ref 10.3–14.5)
RBC CASTS # UR COMP ASSIST: 3 /HPF — SIGNIFICANT CHANGE UP (ref 0–4)
SARS-COV-2 RNA SPEC QL NAA+PROBE: SIGNIFICANT CHANGE UP
SODIUM SERPL-SCNC: 131 MMOL/L — LOW (ref 135–145)
SODIUM SERPL-SCNC: 132 MMOL/L — LOW (ref 135–145)
SP GR SPEC: 1.02 — SIGNIFICANT CHANGE UP (ref 1.01–1.02)
SPECIMEN SOURCE: SIGNIFICANT CHANGE UP
UROBILINOGEN FLD QL: NEGATIVE — SIGNIFICANT CHANGE UP
WBC # BLD: 7.74 K/UL — SIGNIFICANT CHANGE UP (ref 3.8–10.5)
WBC # FLD AUTO: 7.74 K/UL — SIGNIFICANT CHANGE UP (ref 3.8–10.5)
WBC UR QL: 2 /HPF — SIGNIFICANT CHANGE UP (ref 0–5)

## 2022-09-02 PROCEDURE — 71275 CT ANGIOGRAPHY CHEST: CPT | Mod: 26

## 2022-09-02 PROCEDURE — 93306 TTE W/DOPPLER COMPLETE: CPT | Mod: 26

## 2022-09-02 PROCEDURE — 99233 SBSQ HOSP IP/OBS HIGH 50: CPT

## 2022-09-02 PROCEDURE — 99223 1ST HOSP IP/OBS HIGH 75: CPT

## 2022-09-02 RX ORDER — VANCOMYCIN HCL 1 G
VIAL (EA) INTRAVENOUS
Refills: 0 | Status: DISCONTINUED | OUTPATIENT
Start: 2022-09-02 | End: 2022-09-02

## 2022-09-02 RX ORDER — ACETAMINOPHEN 500 MG
975 TABLET ORAL EVERY 8 HOURS
Refills: 0 | Status: DISCONTINUED | OUTPATIENT
Start: 2022-09-02 | End: 2022-09-05

## 2022-09-02 RX ORDER — CEFEPIME 1 G/1
INJECTION, POWDER, FOR SOLUTION INTRAMUSCULAR; INTRAVENOUS
Refills: 0 | Status: DISCONTINUED | OUTPATIENT
Start: 2022-09-02 | End: 2022-09-05

## 2022-09-02 RX ORDER — IPRATROPIUM/ALBUTEROL SULFATE 18-103MCG
3 AEROSOL WITH ADAPTER (GRAM) INHALATION EVERY 6 HOURS
Refills: 0 | Status: DISCONTINUED | OUTPATIENT
Start: 2022-09-02 | End: 2022-09-05

## 2022-09-02 RX ORDER — OXYCODONE AND ACETAMINOPHEN 5; 325 MG/1; MG/1
1 TABLET ORAL EVERY 4 HOURS
Refills: 0 | Status: DISCONTINUED | OUTPATIENT
Start: 2022-09-02 | End: 2022-09-02

## 2022-09-02 RX ORDER — SODIUM CHLORIDE 9 MG/ML
1000 INJECTION INTRAMUSCULAR; INTRAVENOUS; SUBCUTANEOUS
Refills: 0 | Status: DISCONTINUED | OUTPATIENT
Start: 2022-09-02 | End: 2022-09-02

## 2022-09-02 RX ORDER — CEFEPIME 1 G/1
1000 INJECTION, POWDER, FOR SOLUTION INTRAMUSCULAR; INTRAVENOUS EVERY 8 HOURS
Refills: 0 | Status: DISCONTINUED | OUTPATIENT
Start: 2022-09-02 | End: 2022-09-05

## 2022-09-02 RX ORDER — OXYCODONE HYDROCHLORIDE 5 MG/1
5 TABLET ORAL EVERY 4 HOURS
Refills: 0 | Status: DISCONTINUED | OUTPATIENT
Start: 2022-09-02 | End: 2022-09-05

## 2022-09-02 RX ORDER — NICOTINE POLACRILEX 2 MG
1 GUM BUCCAL DAILY
Refills: 0 | Status: DISCONTINUED | OUTPATIENT
Start: 2022-09-02 | End: 2022-09-05

## 2022-09-02 RX ORDER — ACETAMINOPHEN 500 MG
1000 TABLET ORAL ONCE
Refills: 0 | Status: COMPLETED | OUTPATIENT
Start: 2022-09-02 | End: 2022-09-02

## 2022-09-02 RX ORDER — OXYCODONE AND ACETAMINOPHEN 5; 325 MG/1; MG/1
2 TABLET ORAL EVERY 4 HOURS
Refills: 0 | Status: DISCONTINUED | OUTPATIENT
Start: 2022-09-02 | End: 2022-09-02

## 2022-09-02 RX ORDER — CEFEPIME 1 G/1
1000 INJECTION, POWDER, FOR SOLUTION INTRAMUSCULAR; INTRAVENOUS ONCE
Refills: 0 | Status: COMPLETED | OUTPATIENT
Start: 2022-09-02 | End: 2022-09-02

## 2022-09-02 RX ORDER — VANCOMYCIN HCL 1 G
1000 VIAL (EA) INTRAVENOUS EVERY 12 HOURS
Refills: 0 | Status: DISCONTINUED | OUTPATIENT
Start: 2022-09-02 | End: 2022-09-02

## 2022-09-02 RX ORDER — VANCOMYCIN HCL 1 G
1000 VIAL (EA) INTRAVENOUS ONCE
Refills: 0 | Status: COMPLETED | OUTPATIENT
Start: 2022-09-02 | End: 2022-09-02

## 2022-09-02 RX ADMIN — SODIUM CHLORIDE 75 MILLILITER(S): 9 INJECTION INTRAMUSCULAR; INTRAVENOUS; SUBCUTANEOUS at 06:01

## 2022-09-02 RX ADMIN — Medication 3 MILLILITER(S): at 18:21

## 2022-09-02 RX ADMIN — Medication 3 MILLILITER(S): at 13:00

## 2022-09-02 RX ADMIN — Medication 400 MILLIGRAM(S): at 05:28

## 2022-09-02 RX ADMIN — CEFEPIME 100 MILLIGRAM(S): 1 INJECTION, POWDER, FOR SOLUTION INTRAMUSCULAR; INTRAVENOUS at 08:56

## 2022-09-02 RX ADMIN — Medication 1200 MILLIGRAM(S): at 18:21

## 2022-09-02 RX ADMIN — CEFEPIME 100 MILLIGRAM(S): 1 INJECTION, POWDER, FOR SOLUTION INTRAMUSCULAR; INTRAVENOUS at 13:03

## 2022-09-02 RX ADMIN — Medication 975 MILLIGRAM(S): at 21:49

## 2022-09-02 RX ADMIN — Medication 1 PATCH: at 10:46

## 2022-09-02 RX ADMIN — Medication 1 PATCH: at 18:23

## 2022-09-02 RX ADMIN — Medication 250 MILLIGRAM(S): at 10:43

## 2022-09-02 RX ADMIN — Medication 1000 MILLIGRAM(S): at 06:55

## 2022-09-02 RX ADMIN — Medication 1200 MILLIGRAM(S): at 10:43

## 2022-09-02 RX ADMIN — CEFEPIME 100 MILLIGRAM(S): 1 INJECTION, POWDER, FOR SOLUTION INTRAMUSCULAR; INTRAVENOUS at 21:11

## 2022-09-02 RX ADMIN — Medication 975 MILLIGRAM(S): at 13:00

## 2022-09-02 RX ADMIN — Medication 975 MILLIGRAM(S): at 15:13

## 2022-09-02 RX ADMIN — Medication 975 MILLIGRAM(S): at 22:19

## 2022-09-02 RX ADMIN — OXYCODONE HYDROCHLORIDE 5 MILLIGRAM(S): 5 TABLET ORAL at 20:47

## 2022-09-02 RX ADMIN — OXYCODONE HYDROCHLORIDE 5 MILLIGRAM(S): 5 TABLET ORAL at 20:17

## 2022-09-02 NOTE — PROGRESS NOTE ADULT - PROBLEM SELECTOR PLAN 3
Headache since coming to ED, no red flag symptoms, no temporal tenderness, no nuchal rigidity  - acetaminophen prn  - oxy 5 for severe pain  - if worsens, consider nonconCTH

## 2022-09-02 NOTE — PROGRESS NOTE ADULT - ASSESSMENT
67F with PMH breast cancer (L sided, in 2000 and 2019, s/p lumpectomies x 2), UC on mesalamine, current smoker, sent to ED from pulm office for tachycardia, found to have LLL consolidation, admitted for hypoxic respiratory failure 2/2 PNA.

## 2022-09-02 NOTE — CONSULT NOTE ADULT - CONSULT REASON
hypoxemia; left lower lobe pneumonia; COPD; abnormal chest CT with multiple lung nodules; fever/chills hypoxemia; left lower lobe pneumonia; pleurisy; COPD; abnormal chest CT with multiple lung nodules; fever/chills hypoxemia; left lower lobe pneumonia; pleurisy; COPD; abnormal chest CT with multiple lung nodules; fever/chills; pericardial effusion

## 2022-09-02 NOTE — PROGRESS NOTE ADULT - PROBLEM SELECTOR PLAN 2
- broadened to vanc, cefepime (9/2-  - c/w O2 supplementation as above  - trend fever curve, WBC, procal  - ID and pulm following, apprec recs

## 2022-09-02 NOTE — CONSULT NOTE ADULT - ASSESSMENT
67F with COPD, lung nodules,  breast cancer (L sided, in 2000 and 2019, s/p lumpectomies x 2), UC on mesalamine, current smoker p/w tachycardia admitted 8/31/22 with LLL pneumonia. She has had some symptomatic improvement, resolution of modest leukocytosis and >50% decline in serial ProCalcitonin levels but has persistent fevers. Chest exam consistent with the dense consolidation on chest ct with absent breath sounds left lower posterior lung fields.  Clinical presentation unlikely to represent MRSA pneumonia    Antibiotics  Ceftriaxone 8/30  Azithromycin 8/30 --> 9/1  Vanco 9/2-->  Cefepime 9/2-->    Suggest  STOP Vanco  Continue Cefepime  bronchodilator, acappella device, vigorous chest PT  May consider change to po Levofloxacin 750 mg po daily in a few days  CONTINUE Antibiotics through 9/8/22    message sent to primary attending  ID service will follow over weekend

## 2022-09-02 NOTE — PROGRESS NOTE ADULT - ATTENDING COMMENTS
LLL pneumonia with sepsis present on admission. Patient was febrile overnight Tm>103. Did take 3 doses of Ceftriaxone IV.   Consulted ID. Anbx changed to cefepime.   If remains clinically stable for the next 2 days, consider transition to PO anbx and discharge to home.    Pericardial effusion - small, present since TTE of 8/2. Asymptomatic. No obvious etiology. TTE findings discussed with the patient - will need outpatient f/up with cardiology.
Pt seen and examined. No acute events overnight. She denies cp, sob. Does c/o nonproductive cough and headache.  On exam, sating 95% on 2L NC ; LLL crackles on auscultation.  Labs reviewed ; Bld cx NGTD ( prelim), urine Legionella negative.  d/c Azithromycin ; c/w Ceftriaxone. Robitussin prn for cough.  Wean off O2 as tolerated ; anticipate early discharge on PO abx.    d/w ACP Yadira

## 2022-09-02 NOTE — PROGRESS NOTE ADULT - SUBJECTIVE AND OBJECTIVE BOX
Patient is a 67y old  Female who presents with a chief complaint of tachycardia (01 Sep 2022 10:44)      SUBJECTIVE / OVERNIGHT EVENTS:  Overnight patient febrile to 103, CTPE neg but with larger pleural effusion, abx broadened. Patient feeling well, frustrated to be hospitalized.    MEDICATIONS  (STANDING):  albuterol/ipratropium for Nebulization 3 milliLiter(s) Nebulizer every 6 hours  anastrozole 1 milliGRAM(s) Oral daily  cefepime   IVPB      cefepime   IVPB 1000 milliGRAM(s) IV Intermittent every 8 hours  enoxaparin Injectable 40 milliGRAM(s) SubCutaneous every 24 hours  guaiFENesin ER 1200 milliGRAM(s) Oral every 12 hours  lidocaine   4% Patch 1 Patch Transdermal daily  mesalamine DR (24-Hour) Tablet 2.4 Gram(s) Oral daily  nicotine - 21 mG/24Hr(s) Patch 1 Patch Transdermal daily  vancomycin  IVPB 1000 milliGRAM(s) IV Intermittent every 12 hours  vancomycin  IVPB        MEDICATIONS  (PRN):  acetaminophen     Tablet .. 975 milliGRAM(s) Oral every 8 hours PRN Temp greater or equal to 38C (100.4F), Mild Pain (1 - 3), Moderate Pain (4 - 6)  aluminum hydroxide/magnesium hydroxide/simethicone Suspension 30 milliLiter(s) Oral every 4 hours PRN Dyspepsia  diazepam    Tablet 10 milliGRAM(s) Oral daily PRN anxiety  melatonin 3 milliGRAM(s) Oral at bedtime PRN Insomnia  ondansetron Injectable 4 milliGRAM(s) IV Push every 8 hours PRN Nausea and/or Vomiting  oxyCODONE    IR 5 milliGRAM(s) Oral every 4 hours PRN Severe Pain (7 - 10)      Vital Signs Last 24 Hrs  T(C): 37.3 (02 Sep 2022 08:58), Max: 39.5 (02 Sep 2022 04:41)  T(F): 99.2 (02 Sep 2022 08:58), Max: 103.1 (02 Sep 2022 04:41)  HR: 83 (02 Sep 2022 08:58) (83 - 108)  BP: 117/83 (02 Sep 2022 08:58) (117/83 - 147/99)  BP(mean): --  RR: 18 (02 Sep 2022 08:58) (18 - 18)  SpO2: 96% (02 Sep 2022 08:58) (91% - 96%)    Parameters below as of 02 Sep 2022 08:58  Patient On (Oxygen Delivery Method): nasal cannula  O2 Flow (L/min): 2    CAPILLARY BLOOD GLUCOSE        I&O's Summary    01 Sep 2022 07:01  -  02 Sep 2022 07:00  --------------------------------------------------------  IN: 960 mL / OUT: 0 mL / NET: 960 mL    02 Sep 2022 07:01  -  02 Sep 2022 11:50  --------------------------------------------------------  IN: 0 mL / OUT: 1 mL / NET: -1 mL        PHYSICAL EXAM:  GENERAL: NAD, well-developed  HEAD:  Atraumatic, Normocephalic  EYES: EOMI, PERRLA, conjunctiva and sclera clear  NECK: Supple, No JVD  CHEST/LUNG: Decreased BS to mid Left Lung, R side CTA  HEART: Regular rate and rhythm; No murmurs, rubs, or gallops  ABDOMEN: Soft, Nontender, Nondistended; Bowel sounds present  EXTREMITIES:  2+ Peripheral Pulses, No clubbing, cyanosis, or edema  PSYCH: AAOx3  NEUROLOGY: non-focal  SKIN: No rashes or lesions    LABS:                        12.7   7.74  )-----------( 175      ( 02 Sep 2022 09:29 )             38.4     0902    132<L>  |  95<L>  |  15  ----------------------------<  92  3.9   |  25  |  0.91    Ca    9.1      02 Sep 2022 09:29    TPro  x   /  Alb  x   /  TBili  0.7  /  DBili  x   /  AST  x   /  ALT  x   /  AlkPhos  x       PT/INR - ( 02 Sep 2022 06:35 )   PT: 12.3 sec;   INR: 1.06 ratio               Urinalysis Basic - ( 02 Sep 2022 07:55 )    Color: Light Yellow / Appearance: Clear / S.018 / pH: x  Gluc: x / Ketone: Trace  / Bili: Negative / Urobili: Negative   Blood: x / Protein: Trace / Nitrite: Negative   Leuk Esterase: Negative / RBC: 3 /hpf / WBC 2 /HPF   Sq Epi: x / Non Sq Epi: 14 /hpf / Bacteria: Negative        RADIOLOGY & ADDITIONAL TESTS:    Imaging Personally Reviewed:    Consultant(s) Notes Reviewed:      Care Discussed with Consultants/Other Providers:

## 2022-09-02 NOTE — PROVIDER CONTACT NOTE (OTHER) - ASSESSMENT
101.1F temp and 104 HR. all other VSS. pt resting comfortably in bed. pt denies distress/pain/chills/headache.

## 2022-09-02 NOTE — CONSULT NOTE ADULT - ASSESSMENT
ASSESSMENT:    67 year old gentlewoman, current smoker, followed by Dr. Juan M Quintero of our practice for an abnormal chest CT with multiple lung nodules and COPD. Findings on a previous PET scan were non-specific. She has a history of breast cancer s/p 2 lumpectomies of the left breast in 2000 and 2019 with XRT. She is hyperthyroid due to a goiter. She developed fatigue, malaise weakness, anorexia and an unsteady gait ~ 1 week ago. She had a negative COVID test on Monday, August 29th. She was seen in our office the following day for a routine PFT study. She was sent to the ER looking unwell with tachycardia. While in the hospital the patient has had high fevers associated with chills and sweats. She has mild shortness of breath with stable oxygenation on a 2lpm nasal canula. She has a cough but has been unable to expectorate. She has no chest congestion and wheeze. She has left sided chest pain exacerbated by deep inspiration. Symptoms continue despite treatment with ceftriaxone and azithromycin    The patient has near complete consolidation of the left lower lobe with "air bronchograms" on CT scan - there is a minimal parapneumonic effusion - of concern are the ongoing fevers despite treatment for typical community acquired pneumonia with ceftriaxone and azithromycin - there was no evidence of an endobronchial lesion on a recent lung cancer screening CT scan    PLAN/RECOMMENDATIONS:    oxygen supplementation to keep saturation greater than 92% - currently on a 2lpm nasal canula  MRSA nasal swab  sputum culture - collection cup placed at bedside  would add vancomycin and agree with switching ceftriaxone to cefepime  azithromycin has been appropriately discontinued in the setting of a negative RVP and urine Legionella antigen  albuterol/atrovent nebs q6h  mucinex 1200mg 2 times daily   would cut back on IV fluids as the patient is eating fairly well and her hands and feet are swelling  analgesics for ongoing headache and pleuritic chest pain  nicotine patch  watch for alcohol withdrawal    Thank you for the courtesy of this referral. Plan of care discussed with the patient at bedside and the housestaff    Jorge Trujillo MD, [facility]  [phone]  Pulmonary Medicine           ASSESSMENT:    67 year old gentlewoman, current smoker, followed by Dr. Juan M Quintero of our practice for an abnormal chest CT with multiple lung nodules and COPD. Findings on a previous PET scan were non-specific. She has a history of breast cancer s/p 2 lumpectomies of the left breast in 2000 and 2019 with XRT. She is hyperthyroid due to a goiter. She developed fatigue, malaise weakness, anorexia and an unsteady gait ~ 1 week ago. She had a negative COVID test on Monday, August 29th. She was seen in our office the following day for a routine PFT study. She was sent to the ER looking unwell with tachycardia. While in the hospital the patient has had high fevers associated with chills and sweats. She has mild shortness of breath with stable oxygenation on a 2lpm nasal canula. She has a cough but has been unable to expectorate. She has no chest congestion and wheeze. She has left sided chest pain exacerbated by deep inspiration. Symptoms continue despite treatment with ceftriaxone and azithromycin    The patient has near complete consolidation of the left lower lobe with "air bronchograms" on CT scan - there is no evidence of significant mucous plugging or an endobronchial lesion to prevent resolution of the infection - there is a minimal parapneumonic effusion - of concern are the ongoing fevers despite treatment for typical community acquired pneumonia with ceftriaxone and azithromycin     PLAN/RECOMMENDATIONS:    oxygen supplementation to keep saturation greater than 92% - currently on a 2lpm nasal canula  MRSA nasal swab  sputum culture - collection cup placed at bedside  would add vancomycin and agree with switching ceftriaxone to cefepime  azithromycin has been appropriately discontinued in the setting of a negative RVP and urine Legionella antigen  albuterol/atrovent nebs q6h  mucinex 1200mg 2 times daily   would cut back on IV fluids as the patient is eating fairly well and her hands and feet are swelling  analgesics for ongoing headache and pleuritic chest pain  nicotine patch  watch for alcohol withdrawal    Thank you for the courtesy of this referral. Plan of care discussed with the patient at bedside and the housestaff    Jorge Trujillo MD, [facility]  [phone]  Pulmonary Medicine           ASSESSMENT:    67 year old gentlewoman, current smoker, followed by Dr. Juan M Quintero of our practice for an abnormal chest CT with multiple lung nodules and COPD. Findings on a previous PET scan were non-specific. She has a history of breast cancer s/p 2 lumpectomies of the left breast in 2000 and 2019 with XRT. She is hyperthyroid due to a goiter. She developed fatigue, malaise weakness, anorexia and an unsteady gait ~ 1 week ago. She had a negative COVID test on Monday, August 29th. She was seen in our office the following day for a routine PFT study. She was sent to the ER looking unwell with tachycardia. While in the hospital the patient has had high fevers associated with chills and sweats. She has mild shortness of breath with stable oxygenation on a 2lpm nasal canula. She has a cough but has been unable to expectorate. She has no chest congestion and wheeze. She has left sided chest pain exacerbated by deep inspiration. Symptoms continue despite treatment with ceftriaxone and azithromycin    The patient has near complete consolidation of the left lower lobe with "air bronchograms" on CT scan - there is no evidence of significant mucous plugging or an endobronchial lesion to prevent resolution of the infection - there is a minimal parapneumonic effusion - of concern are the ongoing fevers despite treatment for typical community acquired pneumonia with ceftriaxone and azithromycin     PLAN/RECOMMENDATIONS:    oxygen supplementation to keep saturation greater than 92% - currently on a 2lpm nasal canula  MRSA nasal swab  sputum culture - collection cup placed at bedside  would add vancomycin and agree with switching ceftriaxone to cefepime  azithromycin has been appropriately discontinued in the setting of a negative RVP and urine Legionella antigen  albuterol/atrovent nebs q6h  mucinex 1200mg 2 times daily   would cut back on IV fluids as the patient is eating fairly well and her hands and feet are swelling  analgesics for ongoing headache and pleuritic chest pain  nicotine patch  watch for alcohol withdrawal  Dr. Juan M Quintero of our office with follow the pulmonary nodules in the outpatient setting and obtain tissue as indicated    Thank you for the courtesy of this referral. Plan of care discussed with the patient at bedside and the housestaff    Jorge rTujillo MD, [facility]  [phone]  Pulmonary Medicine

## 2022-09-02 NOTE — CONSULT NOTE ADULT - SUBJECTIVE AND OBJECTIVE BOX
[facility] - [facility] - CONSULT NOTE    HPI: 67 year old gentlewoman, current smoker, followed by Dr. Anju Patricia of our practice for an abnormal chest CT with multiple lung nodules and likely COPD. Findings on a previous PET scan findings were non-specific. She was having a routine PFT study in our office on Tuesday and was sent to the ER with tachycardia, fatigue and malaise, lightheadedness and shortness of breath. She has a history of breast cancer s/p 2 lumpectomies of the left breast in  and  with XRT. She is hyperthyroid due to a goiter    PMHX:  Breast cancer - left  Ulcerative colitis  Goiter -> hyperthyroidism  Anxiety    PSHX:  Lumpectomy x 2 -  - 2019  Tonsillectomy    FAMILY HISTORY:  mother - lung cancer - CAD  father - CAD  sister - asthma    SOCIAL HISTORY:  current smoker; daily EtOH    Pulmonary Medications:       Antimicrobials:  cefepime   IVPB      cefepime   IVPB 1000 milliGRAM(s) IV Intermittent every 8 hours    Cardiology:      Other:  anastrozole 1 milliGRAM(s) Oral daily  enoxaparin Injectable 40 milliGRAM(s) SubCutaneous every 24 hours  lidocaine   4% Patch 1 Patch Transdermal daily  mesalamine DR (24-Hour) Tablet 2.4 Gram(s) Oral daily  sodium chloride 0.9%. 1000 milliLiter(s) IV Continuous <Continuous>      Prn:  MEDICATIONS  (PRN):  acetaminophen     Tablet .. 975 milliGRAM(s) Oral every 8 hours PRN Temp greater or equal to 38C (100.4F), Mild Pain (1 - 3), Moderate Pain (4 - 6), Severe Pain (7 - 10)  aluminum hydroxide/magnesium hydroxide/simethicone Suspension 30 milliLiter(s) Oral every 4 hours PRN Dyspepsia  diazepam    Tablet 10 milliGRAM(s) Oral daily PRN anxiety  guaiFENesin Oral Liquid (Sugar-Free) 100 milliGRAM(s) Oral every 6 hours PRN Cough  melatonin 3 milliGRAM(s) Oral at bedtime PRN Insomnia  ondansetron Injectable 4 milliGRAM(s) IV Push every 8 hours PRN Nausea and/or Vomiting      Allergies    No Known Allergies    HOME MEDICATIONS: see  H & P    REVIEW OF SYSTEMS:  Constitutional: As per HPI  HEENT: Within normal limits  CV: As per HPI  Resp: As per HPI  GI: Within normal limits   : Within normal limits  Musculoskeletal: Within normal limits  Skin: Within normal limits  Neurological: Within normal limits  Psychiatric: Within normal limits  Endocrine: Within normal limits  Hematologic/Lymphatic: Within normal limits  Allergic/Immunologic: Within normal limits    [x] All other systems negative    OBJECTIVE:    PHYSICAL EXAM:  ICU Vital Signs Last 24 Hrs  T(C): 37.3 (02 Sep 2022 08:58), Max: 39.5 (02 Sep 2022 04:41)  T(F): 99.2 (02 Sep 2022 08:58), Max: 103.1 (02 Sep 2022 04:41)  HR: 83 (02 Sep 2022 08:58) (83 - 108)  BP: 117/83 (02 Sep 2022 08:58) (113/81 - 147/99)  BP(mean): --  ABP: --  ABP(mean): --  RR: 18 (02 Sep 2022 08:58) (18 - 18)  SpO2: 96% (02 Sep 2022 08:58) (91% - 97%) on 2lpm nasal canula    General: Awake. Alert. Cooperative. No distress. Appears stated age 	  HEENT:   Atraumatic. Normocephalic. Anicteric. Normal oral mucosa. PERRL. EOMI.  Neck: Supple. Trachea midline. Thyroid without enlargement/tenderness/nodules. No carotid bruit. No JVD.	  Cardiovascular: Regular rate and rhythm. S1 S2 normal. No murmurs, rubs or gallops.  Respiratory: Respirations unlabored. Clear to auscultation and percussion bilaterally. No curvature.  Abdomen: Soft. Non-tender. Non-distended. No organomegaly. No masses. Normal bowel sounds.  Extremities: Warm to touch. No clubbing or cyanosis. No pedal edema.  Pulses: 2+ peripheral pulses all extremities.	  Skin: Normal skin color. No rashes or lesions. No ecchymoses. No cyanosis. Warm to touch.  Lymph Nodes: Cervical, supraclavicular and axillary nodes normal  Neurological: Motor and sensory examination equal and normal. A and O x 3  Psychiatry: Appropriate mood and affect.      LABS:                          12.3   6.02  )-----------( 166      ( 01 Sep 2022 22:38 )             35.7     CBC    WBC  6.02 <==, 6.39 <==, 8.61 <==, 11.70 <==    Hemoglobin  12.3 <<==, 13.1 <<==, 13.4 <<==, 14.0 <<==    Hematocrit  35.7 <==, 38.8 <==, 39.9 <==, 40.7 <==    Platelets  166 <==, 162 <==, 170 <==, 196 <==      131<L>  |  x   |  x   ----------------------------<  x     09-02  x    |  x   |  0.90    LYTES    sodium  131 <==, 133 <==, 137 <==, 130 <==    potassium   3.9 <==, 3.9 <==, 3.8 <==    chloride  96 <==, 100 <==, 91 <==    carbon dioxide  24 <==, 25 <==, 23 <==    =============================================================================================  RENAL FUNCTION:    Creatinine:   0.90  <<==, 0.88  <<==, 1.28  <<==, 1.20  <<==    BUN:   14 <==, 20 <==, 17 <==    ============================================================================================    calcium   9.1 <==, 9.4 <==, 9.8 <==    ===========================================================================================  LFTs    AST:   36 <== , 40 <==     ALT:  20  <== , 21  <==     AP:  65  <=, 78  <=    Bili:  0.7  <=, 1.3  <=, 2.0  <=    PT/INR - ( 02 Sep 2022 06:35 )   PT: 12.3 sec;   INR: 1.06 ratio      Procalcitonin, Serum: 4.15 ng/mL ( @ 05:43)  Procalcitonin, Serum: 3.35 ng/mL ( @ 18:54)    MICROBIOLOGY:     Respiratory Viral Panel with COVID-19 by GENOVEVA (22 @ 22:51)   Rapid RVP Result: [facility]   SARS-CoV-2: NotDete: This Respiratory Panel uses polymerase chain reaction (PCR) to detect for   adenovirus; coronavirus (HKU1, NL63, 229E, OC43); human metapneumovirus   (hMPV); human enterovirus/rhinovirus (Entero/RV); influenza A; influenza   A/H1; influenza A/H3; influenza A/H1-2009; influenza B; parainfluenza   viruses 1, 2, 3, 4; respiratory syncytial virus; Mycoplasma pneumoniae;   Chlamydophila pneumoniae; and SARS-CoV-2.     Urinalysis Basic - ( 02 Sep 2022 07:55 )    Color: Light Yellow / Appearance: Clear / S.018 / pH: x  Gluc: x / Ketone: Trace  / Bili: Negative / Urobili: Negative   Blood: x / Protein: Trace / Nitrite: Negative   Leuk Esterase: Negative / RBC: 3 /hpf / WBC 2 /HPF   Sq Epi: x / Non Sq Epi: 14 /hpf / Bacteria: Negative    Culture - Urine (22 @ 21:14)   Specimen Source: Clean Catch Clean Catch (Midstream)   Culture Results:   <10,000 CFU/mL Normal Urogenital Jossy     Culture - Blood (22 @ 18:37)   Specimen Source: .Blood Blood-Peripheral   Culture Results:   No growth to date.     Culture - Blood (22 @ 18:20)   Specimen Source: .Blood Blood-Peripheral   Culture Results:   No growth to date.     Legionella pneumophila Antigen, Urine (22 @ 21:00)   Legionella Antigen, Urine: Negative      RADIOLOGY:  [x ] Chest radiographs reviewed and interpreted by me    EXAM:  XR CHEST PA LAT 2V                          PROCEDURE DATE:  2022      FINDINGS:    LUNGS: Left lower lobe patchy opacity.    PLEURA: No pleural effusion or pneumothorax.    HEART AND MEDIASTINUM: Heart size is within normal limits.    SKELETON: Unremarkable skeletal structures.      IMPRESSION:    Left lower lobe patchyopacity, concerning for pneumonia.    SHAY NELSON MD; Resident Radiologist  This document has been electronically signed.  TIFFANY PADILLA MD; Attending Radiologist  This document has been electronically signed. Aug 31 2022  9:49AM  ---------------------------------------------------------------------------------------------------------------  CT LDCT LUNG CA SHORT TERM F U  - ORDERED BY: ANJU PATRICIA    PROCEDURE DATE:  2022      FINDINGS:    LUNGS/AIRWAYS/PLEURA: Slightly smaller ill-defined 1.2 cm right apex   nodule (4-34) casey region of scarring, compared to 3/7/2022. Resolved   previously new left upper lobe nodule compared to 3/7/2022. Stable 0.5 cm   nodules in the right apex (4-21) and the left apex (4-26). New 0.9 cm   nodule in the posterior left lower lobe (4-84).No pleural effusion.    LYMPH NODES/MEDIASTINUM: No enlarged lymph nodes. Unchanged diffuse   thyromegaly.    HEART/VASCULATURE: Normal heart size. Small pericardial effusion. Normal   caliber aorta.    UPPER ABDOMEN: Hepatic cysts and partially included hypodensity in the   left hepatic lobe that cannot be further characterized.    BONES/SOFT TISSUES: Degenerative changes of the spine.      IMPRESSION:    New 0.9 cm left lower lobe nodule may be inflammatory. Follow-up in 4-6   weeks is recommended.    Resolved previously new right upper lobe nodule since PET/CT 3/21/2022.    Slightly decreased 1.2 cm right apical nodule compared to CT chest   3/7/2022.    Other unchanged bilateral 0.5 cm nodules.    Lung RADS 2S    OLMAN BOB M.D., ATTENDING RADIOGIST   This document has been electronically signed. Aug  5 2022  3:09PM  ---------------------------------------------------------------------------------------------------------------  EXAM:  US ABDOMEN RT UPR QUADRANT                          PROCEDURE DATE:  2022      FINDINGS:  Liver: 2.4 cm left and 4.3 cm right hepatic lobe cysts.  Bile ducts: Normal caliber. Common bile duct measures 4 mm.  Gallbladder: Contracted. No cholelithiasis. No pericholecystic fluid.   Negative sonographic Fitch sign.  Pancreas: Visualized portions are within normal limits.  Right kidney: 10.5 cm. No hydronephrosis. 1.1 cm interpolar cyst.  Ascites: None.  IVC: Visualized portions are within normal limits.    IMPRESSION:  Bilobar hepatic cysts.     SALLY DE LEON MD; Resident Radiologist  This document has been electronically signed.  KYREE THORNTON MD; Attending Radiologist  This document has been electronically signed. Aug 30 2022 11:14PM  ---------------------------------------------------------------------------------------------------------------             [facility] - [facility] - CONSULT NOTE    HPI: 67 year old gentlewoman, current smoker, followed by Dr. Anju Patricia of our practice for an abnormal chest CT with multiple lung nodules and COPD. Findings on a previous PET scan were non-specific. She has a history of breast cancer s/p 2 lumpectomies of the left breast in  and  with XRT. She is hyperthyroid due to a goiter. She developed fatigue, malaise weakness, anorexia and an unsteady gait ~ 1 week ago. She had a negative COVID test on Monday, . She was seen in our office the following day for a routine PFT study. She was sent to the ER looking unwell with tachycardia. While in the hospital the patient has had high fevers associated with chills and sweats. She has mild shortness of breath with stable oxygenation on a 2lpm nasal canula. She has a cough but has been unable to expectorate. She has no chest congestion and wheeze. She has left sided chest pain exacerbated by deep inspiration. Symptoms continue despite treatment with ceftriaxone and azithromycin. Asked to evaluate.    PMHX:  Breast cancer - left  Ulcerative colitis  Goiter -> hyperthyroidism  Anxiety    PSHX:  Lumpectomy x 2 -  - 2019  Tonsillectomy    FAMILY HISTORY:  mother - lung cancer - CAD  father - CAD  sister - asthma    SOCIAL HISTORY:  current smoker; daily EtOH;  - about to celebrate an anniversary next weekend;    Pulmonary Medications:       Antimicrobials:  cefepime   IVPB      cefepime   IVPB 1000 milliGRAM(s) IV Intermittent every 8 hours    Cardiology:      Other:  anastrozole 1 milliGRAM(s) Oral daily  enoxaparin Injectable 40 milliGRAM(s) SubCutaneous every 24 hours  lidocaine   4% Patch 1 Patch Transdermal daily  mesalamine DR (24-Hour) Tablet 2.4 Gram(s) Oral daily  sodium chloride 0.9%. 1000 milliLiter(s) IV Continuous <Continuous>      Prn:  MEDICATIONS  (PRN):  acetaminophen     Tablet .. 975 milliGRAM(s) Oral every 8 hours PRN Temp greater or equal to 38C (100.4F), Mild Pain (1 - 3), Moderate Pain (4 - 6), Severe Pain (7 - 10)  aluminum hydroxide/magnesium hydroxide/simethicone Suspension 30 milliLiter(s) Oral every 4 hours PRN Dyspepsia  diazepam    Tablet 10 milliGRAM(s) Oral daily PRN anxiety  guaiFENesin Oral Liquid (Sugar-Free) 100 milliGRAM(s) Oral every 6 hours PRN Cough  melatonin 3 milliGRAM(s) Oral at bedtime PRN Insomnia  ondansetron Injectable 4 milliGRAM(s) IV Push every 8 hours PRN Nausea and/or Vomiting      Allergies    No Known Allergies    HOME MEDICATIONS: see  H & P    REVIEW OF SYSTEMS:  Constitutional: As per HPI  HEENT: Within normal limits  CV: As per HPI  Resp: As per HPI  GI: Within normal limits   : Within normal limits  Musculoskeletal: left pleuritic chest pain  Skin: Within normal limits  Neurological: Within normal limits  Psychiatric: Within normal limits  Endocrine: Within normal limits  Hematologic/Lymphatic: Within normal limits  Allergic/Immunologic: Within normal limits    [x] All other systems negative    OBJECTIVE:    PHYSICAL EXAM:  ICU Vital Signs Last 24 Hrs  T(C): 37.3 (02 Sep 2022 08:58), Max: 39.5 (02 Sep 2022 04:41)  T(F): 99.2 (02 Sep 2022 08:58), Max: 103.1 (02 Sep 2022 04:41)  HR: 83 (02 Sep 2022 08:58) (83 - 108)  BP: 117/83 (02 Sep 2022 08:58) (113/81 - 147/99)  BP(mean): --  ABP: --  ABP(mean): --  RR: 18 (02 Sep 2022 08:58) (18 - 18)  SpO2: 96% (02 Sep 2022 08:58) (91% - 97%) on 2lpm nasal canula    General: Awake. Alert. Cooperative. Non toxic appearing. Appears stated age 	  HEENT:  Atraumatic. Normocephalic. Anicteric. Normal oral mucosa. PERRL. EOMI.  Neck: Supple. Trachea midline. Thyroid without enlargement/tenderness/nodules. No carotid bruit. No JVD.	  Cardiovascular: Tachycardic rate and rhythm. S1 S2 normal. No murmurs, rubs or gallops.  Respiratory: Respirations unlabored. Left basilar rales ~ 1/3 up. Pain with deep inspiration. No curvature.  Abdomen: Soft. Non-tender. Non-distended. No organomegaly. No masses. Normal bowel sounds.  Extremities: Warm to touch. No clubbing or cyanosis. No pedal edema.  Pulses: 2+ peripheral pulses all extremities.	  Skin: Normal skin color. No rashes or lesions. No ecchymoses. No cyanosis. Warm to touch.  Lymph Nodes: Cervical, supraclavicular and axillary nodes normal  Neurological: Motor and sensory examination equal and normal. A and O x 3  Psychiatry: Appropriate mood and affect.      LABS:                          12.3   6.02  )-----------( 166      ( 01 Sep 2022 22:38 )             35.7     CBC    WBC  6.02 <==, 6.39 <==, 8.61 <==, 11.70 <==    Hemoglobin  12.3 <<==, 13.1 <<==, 13.4 <<==, 14.0 <<==    Hematocrit  35.7 <==, 38.8 <==, 39.9 <==, 40.7 <==    Platelets  166 <==, 162 <==, 170 <==, 196 <==      131<L>  |  x   |  x   ----------------------------<  x     09-02  x    |  x   |  0.90    LYTES    sodium  131 <==, 133 <==, 137 <==, 130 <==    potassium   3.9 <==, 3.9 <==, 3.8 <==    chloride  96 <==, 100 <==, 91 <==    carbon dioxide  24 <==, 25 <==, 23 <==    =============================================================================================  RENAL FUNCTION:    Creatinine:   0.90  <<==, 0.88  <<==, 1.28  <<==, 1.20  <<==    BUN:   14 <==, 20 <==, 17 <==    ============================================================================================    calcium   9.1 <==, 9.4 <==, 9.8 <==    ===========================================================================================  LFTs    AST:   36 <== , 40 <==     ALT:  20  <== , 21  <==     AP:  65  <=, 78  <=    Bili:  0.7  <=, 1.3  <=, 2.0  <=    PT/INR - ( 02 Sep 2022 06:35 )   PT: 12.3 sec;   INR: 1.06 ratio      Procalcitonin, Serum: 4.15 ng/mL ( @ 05:43)  Procalcitonin, Serum: 3.35 ng/mL ( @ 18:54)    MICROBIOLOGY:     Respiratory Viral Panel with COVID-19 by GENOVEVA (22 @ 22:51)   Rapid RVP Result: NotDetec   SARS-CoV-2: NotDete: This Respiratory Panel uses polymerase chain reaction (PCR) to detect for   adenovirus; coronavirus (HKU1, NL63, 229E, OC43); human metapneumovirus   (hMPV); human enterovirus/rhinovirus (Entero/RV); influenza A; influenza   A/H1; influenza A/H3; influenza A/H1-2009; influenza B; parainfluenza   viruses 1, 2, 3, 4; respiratory syncytial virus; Mycoplasma pneumoniae;   Chlamydophila pneumoniae; and SARS-CoV-2.     Urinalysis Basic - ( 02 Sep 2022 07:55 )    Color: Light Yellow / Appearance: Clear / S.018 / pH: x  Gluc: x / Ketone: Trace  / Bili: Negative / Urobili: Negative   Blood: x / Protein: Trace / Nitrite: Negative   Leuk Esterase: Negative / RBC: 3 /hpf / WBC 2 /HPF   Sq Epi: x / Non Sq Epi: 14 /hpf / Bacteria: Negative    Culture - Urine (22 @ 21:14)   Specimen Source: Clean Catch Clean Catch (Midstream)   Culture Results:   <10,000 CFU/mL Normal Urogenital Jossy     Culture - Blood (22 @ 18:37)   Specimen Source: .Blood Blood-Peripheral   Culture Results:   No growth to date.     Culture - Blood (22 @ 18:20)   Specimen Source: .Blood Blood-Peripheral   Culture Results:   No growth to date.     Legionella pneumophila Antigen, Urine (22 @ 21:00)   Legionella Antigen, Urine: Negative      RADIOLOGY:  [x ] Chest radiographs reviewed and interpreted by me    EXAM:  XR CHEST PA LAT 2V                          PROCEDURE DATE:  2022      FINDINGS:    LUNGS: Left lower lobe patchy opacity.    PLEURA: No pleural effusion or pneumothorax.    HEART AND MEDIASTINUM: Heart size is within normal limits.    SKELETON: Unremarkable skeletal structures.      IMPRESSION:    Left lower lobe patchyopacity, concerning for pneumonia.    SHAY NELSON MD; Resident Radiologist  This document has been electronically signed.  TIFFANY PADILLA MD; Attending Radiologist  This document has been electronically signed. Aug 31 2022  9:49AM  ---------------------------------------------------------------------------------------------------------------  CT LDCT LUNG CA SHORT TERM F U  - ORDERED BY: ANJU PATRICIA    PROCEDURE DATE:  2022      FINDINGS:    LUNGS/AIRWAYS/PLEURA: Slightly smaller ill-defined 1.2 cm right apex   nodule (4-34) casey region of scarring, compared to 3/7/2022. Resolved   previously new left upper lobe nodule compared to 3/7/2022. Stable 0.5 cm   nodules in the right apex (4-21) and the left apex (4-26). New 0.9 cm   nodule in the posterior left lower lobe (4-84).No pleural effusion.    LYMPH NODES/MEDIASTINUM: No enlarged lymph nodes. Unchanged diffuse   thyromegaly.    HEART/VASCULATURE: Normal heart size. Small pericardial effusion. Normal   caliber aorta.    UPPER ABDOMEN: Hepatic cysts and partially included hypodensity in the   left hepatic lobe that cannot be further characterized.    BONES/SOFT TISSUES: Degenerative changes of the spine.      IMPRESSION:    New 0.9 cm left lower lobe nodule may be inflammatory. Follow-up in 4-6   weeks is recommended.    Resolved previously new right upper lobe nodule since PET/CT 3/21/2022.    Slightly decreased 1.2 cm right apical nodule compared to CT chest   3/7/2022.    Other unchanged bilateral 0.5 cm nodules.    Lung RADS 2S    OLMAN BOB M.D., ATTENDING RADIOGIST   This document has been electronically signed. Aug  5 2022  3:09PM  ---------------------------------------------------------------------------------------------------------------  EXAM:  US ABDOMEN RT UPR QUADRANT                          PROCEDURE DATE:  2022      FINDINGS:  Liver: 2.4 cm left and 4.3 cm right hepatic lobe cysts.  Bile ducts: Normal caliber. Common bile duct measures 4 mm.  Gallbladder: Contracted. No cholelithiasis. No pericholecystic fluid.   Negative sonographic Fitch sign.  Pancreas: Visualized portions are within normal limits.  Right kidney: 10.5 cm. No hydronephrosis. 1.1 cm interpolar cyst.  Ascites: None.  IVC: Visualized portions are within normal limits.    IMPRESSION:  Bilobar hepatic cysts.     SALLY DE LEON MD; Resident Radiologist  This document has been electronically signed.  KYREE THORNTON MD; Attending Radiologist  This document has been electronically signed. Aug 30 2022 11:14PM  ---------------------------------------------------------------------------------------------------------------             [facility] - [facility] - CONSULT NOTE    HPI: 67 year old gentlewoman, current smoker, followed by Dr. Anju Patricia of our practice for an abnormal chest CT with multiple lung nodules and COPD. Findings on a previous PET scan were non-specific. She has a history of breast cancer s/p 2 lumpectomies of the left breast in  and  with XRT. She is hyperthyroid due to a goiter. She developed fatigue, malaise weakness, anorexia and an unsteady gait ~ 1 week ago. She had a negative COVID test on Monday, . She was seen in our office the following day for a routine PFT study. She was sent to the ER looking unwell with tachycardia. While in the hospital the patient has had high fevers associated with chills and sweats. She has mild shortness of breath with stable oxygenation on a 2lpm nasal canula. She has a cough but has been unable to expectorate. She has no chest congestion and wheeze. She has left sided chest pain exacerbated by deep inspiration. Symptoms continue despite treatment with ceftriaxone and azithromycin. Asked to evaluate.    PMHX:  Breast cancer - left  Ulcerative colitis  Goiter -> hyperthyroidism  Anxiety    PSHX:  Lumpectomy x 2 -  - 2019  Tonsillectomy    FAMILY HISTORY:  mother - lung cancer - CAD  father - CAD  sister - asthma    SOCIAL HISTORY:  current smoker; daily EtOH;  - about to celebrate an anniversary next weekend;    Pulmonary Medications:       Antimicrobials:  cefepime   IVPB      cefepime   IVPB 1000 milliGRAM(s) IV Intermittent every 8 hours    Cardiology:      Other:  anastrozole 1 milliGRAM(s) Oral daily  enoxaparin Injectable 40 milliGRAM(s) SubCutaneous every 24 hours  lidocaine   4% Patch 1 Patch Transdermal daily  mesalamine DR (24-Hour) Tablet 2.4 Gram(s) Oral daily  sodium chloride 0.9%. 1000 milliLiter(s) IV Continuous <Continuous>      Prn:  MEDICATIONS  (PRN):  acetaminophen     Tablet .. 975 milliGRAM(s) Oral every 8 hours PRN Temp greater or equal to 38C (100.4F), Mild Pain (1 - 3), Moderate Pain (4 - 6), Severe Pain (7 - 10)  aluminum hydroxide/magnesium hydroxide/simethicone Suspension 30 milliLiter(s) Oral every 4 hours PRN Dyspepsia  diazepam    Tablet 10 milliGRAM(s) Oral daily PRN anxiety  guaiFENesin Oral Liquid (Sugar-Free) 100 milliGRAM(s) Oral every 6 hours PRN Cough  melatonin 3 milliGRAM(s) Oral at bedtime PRN Insomnia  ondansetron Injectable 4 milliGRAM(s) IV Push every 8 hours PRN Nausea and/or Vomiting      Allergies    No Known Allergies    HOME MEDICATIONS: see  H & P    REVIEW OF SYSTEMS:  Constitutional: As per HPI  HEENT: Within normal limits  CV: As per HPI  Resp: As per HPI  GI: Within normal limits   : Within normal limits  Musculoskeletal: left pleuritic chest pain  Skin: Within normal limits  Neurological: Within normal limits  Psychiatric: Within normal limits  Endocrine: Within normal limits  Hematologic/Lymphatic: Within normal limits  Allergic/Immunologic: Within normal limits    [x] All other systems negative    OBJECTIVE:    PHYSICAL EXAM:  ICU Vital Signs Last 24 Hrs  T(C): 37.3 (02 Sep 2022 08:58), Max: 39.5 (02 Sep 2022 04:41)  T(F): 99.2 (02 Sep 2022 08:58), Max: 103.1 (02 Sep 2022 04:41)  HR: 83 (02 Sep 2022 08:58) (83 - 108)  BP: 117/83 (02 Sep 2022 08:58) (113/81 - 147/99)  BP(mean): --  ABP: --  ABP(mean): --  RR: 18 (02 Sep 2022 08:58) (18 - 18)  SpO2: 96% (02 Sep 2022 08:58) (91% - 97%) on 2lpm nasal canula    General: Awake. Alert. Cooperative. Non toxic appearing. Appears stated age 	  HEENT:  Atraumatic. Normocephalic. Anicteric. Normal oral mucosa. PERRL. EOMI.  Neck: Supple. Trachea midline. Thyroid without enlargement/tenderness/nodules. No carotid bruit. No JVD.	  Cardiovascular: Tachycardic rate and rhythm. S1 S2 normal. No murmurs, rubs or gallops.  Respiratory: Respirations unlabored. Left basilar rales ~ 1/3 up. Pain with deep inspiration. No curvature.  Abdomen: Soft. Non-tender. Non-distended. No organomegaly. No masses. Normal bowel sounds.  Extremities: Warm to touch. No clubbing or cyanosis. No pedal edema.  Pulses: 2+ peripheral pulses all extremities.	  Skin: Normal skin color. No rashes or lesions. No ecchymoses. No cyanosis. Warm to touch.  Lymph Nodes: Cervical, supraclavicular and axillary nodes normal  Neurological: Motor and sensory examination equal and normal. A and O x 3  Psychiatry: Appropriate mood and affect.      LABS:                          12.3   6.02  )-----------( 166      ( 01 Sep 2022 22:38 )             35.7     CBC    WBC  6.02 <==, 6.39 <==, 8.61 <==, 11.70 <==    Hemoglobin  12.3 <<==, 13.1 <<==, 13.4 <<==, 14.0 <<==    Hematocrit  35.7 <==, 38.8 <==, 39.9 <==, 40.7 <==    Platelets  166 <==, 162 <==, 170 <==, 196 <==      131<L>  |  x   |  x   ----------------------------<  x     09-02  x    |  x   |  0.90    LYTES    sodium  131 <==, 133 <==, 137 <==, 130 <==    potassium   3.9 <==, 3.9 <==, 3.8 <==    chloride  96 <==, 100 <==, 91 <==    carbon dioxide  24 <==, 25 <==, 23 <==    =============================================================================================  RENAL FUNCTION:    Creatinine:   0.90  <<==, 0.88  <<==, 1.28  <<==, 1.20  <<==    BUN:   14 <==, 20 <==, 17 <==    ============================================================================================    calcium   9.1 <==, 9.4 <==, 9.8 <==    ===========================================================================================  LFTs    AST:   36 <== , 40 <==     ALT:  20  <== , 21  <==     AP:  65  <=, 78  <=    Bili:  0.7  <=, 1.3  <=, 2.0  <=    PT/INR - ( 02 Sep 2022 06:35 )   PT: 12.3 sec;   INR: 1.06 ratio      Procalcitonin, Serum: 4.15 ng/mL ( @ 05:43)  Procalcitonin, Serum: 3.35 ng/mL ( @ 18:54)    MICROBIOLOGY:     Respiratory Viral Panel with COVID-19 by GENOVEVA (22 @ 22:51)   Rapid RVP Result: NotDetec   SARS-CoV-2: NotDete: This Respiratory Panel uses polymerase chain reaction (PCR) to detect for   adenovirus; coronavirus (HKU1, NL63, 229E, OC43); human metapneumovirus   (hMPV); human enterovirus/rhinovirus (Entero/RV); influenza A; influenza   A/H1; influenza A/H3; influenza A/H1-2009; influenza B; parainfluenza   viruses 1, 2, 3, 4; respiratory syncytial virus; Mycoplasma pneumoniae;   Chlamydophila pneumoniae; and SARS-CoV-2.     Urinalysis Basic - ( 02 Sep 2022 07:55 )    Color: Light Yellow / Appearance: Clear / S.018 / pH: x  Gluc: x / Ketone: Trace  / Bili: Negative / Urobili: Negative   Blood: x / Protein: Trace / Nitrite: Negative   Leuk Esterase: Negative / RBC: 3 /hpf / WBC 2 /HPF   Sq Epi: x / Non Sq Epi: 14 /hpf / Bacteria: Negative    Culture - Urine (22 @ 21:14)   Specimen Source: Clean Catch Clean Catch (Midstream)   Culture Results:   <10,000 CFU/mL Normal Urogenital Jossy     Culture - Blood (22 @ 18:37)   Specimen Source: .Blood Blood-Peripheral   Culture Results:   No growth to date.     Culture - Blood (22 @ 18:20)   Specimen Source: .Blood Blood-Peripheral   Culture Results:   No growth to date.     Legionella pneumophila Antigen, Urine (22 @ 21:00)   Legionella Antigen, Urine: Negative      RADIOLOGY:  [x ] Chest radiographs reviewed and interpreted by me    EXAM:  XR CHEST PA LAT 2V                          PROCEDURE DATE:  2022      FINDINGS:    LUNGS: Left lower lobe patchy opacity.    PLEURA: No pleural effusion or pneumothorax.    HEART AND MEDIASTINUM: Heart size is within normal limits.    SKELETON: Unremarkable skeletal structures.      IMPRESSION:    Left lower lobe patchyopacity, concerning for pneumonia.    SHAY NELSON MD; Resident Radiologist  This document has been electronically signed.  TIFFANY PADILLA MD; Attending Radiologist  This document has been electronically signed. Aug 31 2022  9:49AM  ---------------------------------------------------------------------------------------------------------------  < from: CT Angio Chest PE Protocol w/ IV Cont (22 @ 07:38) >    ACC: [id number] [facility]                          PROCEDURE DATE:  2022          INTERPRETATION:  CLINICAL INFORMATION: Left lower lobe pneumonia on   antibiotics with recurrent fever. New oxygen desaturation. Concern for   pulmonary embolus.    COMPARISON: Chest radiograph dated 2022. Chest CT dated 2022.    CONTRAST/COMPLICATIONS:  IV Contrast: Omnipaque 350  80 cc administered   20 cc discarded  Oral Contrast: NONE  Complications: None reported at time of study completion    PROCEDURE:  CT Angiogram of the chest was obtained with intravenous contrast. Three   dimensional maximum intensity projection (MIP) images were generated.    FINDINGS:    PULMONARY ANGIOGRAM: No pulmonary embolus. The main pulmonary artery is   dilated, measuring up to 3.8 cm.    LYMPH NODES: Scattered prominent subcentimeter lymph nodes throughout the   mediastinum, likely reactive in nature.    HEART/VASCULATURE: The heart is normal in size. Small pericardial   effusion, increased from 2022. The thoracic aorta is normal in   caliber.    AIRWAYS/LUNGS/PLEURA: No endobronchial lesion. Large hypoattenuating left   lower lobe consolidation almost entirely filling the lobe. There is   subsegmental atelectasis of the lingula. There is also linear atelectasis   of the right upper lobe and mild right lower lobe dependent atelectasis.   Stable nodules up to 1.2 cm in the right apex. Previously new left lower   lobe nodule is now obscured by consolidation. No pleural effusion or   pneumothorax.    UPPER ABDOMEN: Hepatic cysts, incompletely imaged.    BONES/SOFT TISSUES: Mild degenerative changes of the spine.    IMPRESSION:    No pulmonary embolus.    Large left lower lobe consolidation consistent with known pneumonia.    Small pericardial effusion, increased from 2022.    Dilated main pulmonary artery. Correlate clinically for pulmonary   hypertension.    GERBER ALFARO MD; Resident Radiologist  This document has been electronically signed.  OLMAN BOB M.D., ATTENDING RADIOGIST  This document has been electronically signed. Sep  2 2022 10:13AM  ---------------------------------------------------------------------------------------------------------------    CT LDCT LUNG CA SHORT TERM F U  - ORDERED BY: ANJU PATRICIA    PROCEDURE DATE:  2022      FINDINGS:    LUNGS/AIRWAYS/PLEURA: Slightly smaller ill-defined 1.2 cm right apex   nodule (4-34) casey region of scarring, compared to 3/7/2022. Resolved   previously new left upper lobe nodule compared to 3/7/2022. Stable 0.5 cm   nodules in the right apex (4-21) and the left apex (4-26). New 0.9 cm   nodule in the posterior left lower lobe (4-84).No pleural effusion.    LYMPH NODES/MEDIASTINUM: No enlarged lymph nodes. Unchanged diffuse   thyromegaly.    HEART/VASCULATURE: Normal heart size. Small pericardial effusion. Normal   caliber aorta.    UPPER ABDOMEN: Hepatic cysts and partially included hypodensity in the   left hepatic lobe that cannot be further characterized.    BONES/SOFT TISSUES: Degenerative changes of the spine.      IMPRESSION:    New 0.9 cm left lower lobe nodule may be inflammatory. Follow-up in 4-6   weeks is recommended.    Resolved previously new right upper lobe nodule since PET/CT 3/21/2022.    Slightly decreased 1.2 cm right apical nodule compared to CT chest   3/7/2022.    Other unchanged bilateral 0.5 cm nodules.    Lung RADS 2S    OLMAN BOB M.D., ATTENDING RADIOGIST   This document has been electronically signed. Aug  5 2022  3:09PM  ---------------------------------------------------------------------------------------------------------------  EXAM:  US ABDOMEN RT UPR QUADRANT                          PROCEDURE DATE:  2022      FINDINGS:  Liver: 2.4 cm left and 4.3 cm right hepatic lobe cysts.  Bile ducts: Normal caliber. Common bile duct measures 4 mm.  Gallbladder: Contracted. No cholelithiasis. No pericholecystic fluid.   Negative sonographic Fitch sign.  Pancreas: Visualized portions are within normal limits.  Right kidney: 10.5 cm. No hydronephrosis. 1.1 cm interpolar cyst.  Ascites: None.  IVC: Visualized portions are within normal limits.    IMPRESSION:  Bilobar hepatic cysts.     SALLY DE LEON MD; Resident Radiologist  This document has been electronically signed.  KYREE THORNTON MD; Attending Radiologist  This document has been electronically signed. Aug 30 2022 11:14PM  ---------------------------------------------------------------------------------------------------------------

## 2022-09-02 NOTE — CONSULT NOTE ADULT - SUBJECTIVE AND OBJECTIVE BOX
Patient is a 67y old  Female who presents with a chief complaint of tachycardia (02 Sep 2022 11:50)    HPI:  67F with PMH breast cancer (L sided, in 2000 and 2019, s/p lumpectomies x 2), UC on mesalamine, current smoker p/w tachycardia noted at outpt office. Per pt, started feeling unwell mid-day Sat 8/27, feeling very weak and aftigued and noted some mid  back pain. Pt states she spent most of the weekend sleeping and in bed, endorses generalized chills but no fevers at home, endorsing very poor PO intake, only taking fluids; denies any syncope, ad pain, no n/v, no diarrhea, no dysuria or hematuria.. On Monday she tested negative for COVID. On Tuesday, she had routine pulmonology appt and was found to be tachycardic to 120s and referred to ED. In the ED, pt started noting dry cough and started having fevers and chills.     Pt is long time smoker, currently 5-7 cigarettes per day  Drinks 2 large mixed liquor drinks every day - last drink Friday prior to onset of symptoms, denies any tremors, anxiety, n/v/abd pain.     In ED, found with fevers, tachycardia and hypoxia; s/p ceftriaxone and azithromycin Admitted to CDU for PNA. In CDU, pt noted with ongoing hypoxia and admitted to medicine.  (31 Aug 2022 12:42)    no recent travel  denies ill contacts  states she received "pneumonia shot" but vague on timing and specifics  generally very active, walks, has  in gym    She finds continued hospitalization very stressful as she is preparing a large celebration for anniversary 9/10    pleuritic pain improved  has continued cough - unable to bring up phlegm    she has been undergoing serial CT scans to evaluate pulmonary nodules    PAST MEDICAL & SURGICAL HISTORY:  Breast cancer  History of ulcerative colitis  H/O lumpectomy  2/22 left elbow injury after fall from roller blade ing  recent Physical Therapy for back pain    Social history:  +tob, + etoh, , 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 No nausea, vomiting, or hematemesis; No diarrhea or constipation. No melena or hematochezia.  GENITOURINARY: No dysuria, frequency or hematuria  NEUROLOGICAL: No numbness or weakness  SKIN: No itching, burning, rashes, or lesions   All other review of systems is negative unless indicated above    Allergies  No Known Allergies      Antimicrobials:  cefepime   IVPB      cefepime   IVPB 1000 milliGRAM(s) IV Intermittent every 8 hours  vancomycin  IVPB 1000 milliGRAM(s) IV Intermittent every 12 hours  vancomycin  IVPB          Vital Signs Last 24 Hrs  T(C): 36.8 (02 Sep 2022 12:12), Max: 39.5 (02 Sep 2022 04:41)  T(F): 98.3 (02 Sep 2022 12:12), Max: 103.1 (02 Sep 2022 04:41)  HR: 90 (02 Sep 2022 12:12) (83 - 108)  BP: 150/96 (02 Sep 2022 12:12) (117/83 - 150/96)  BP(mean): --  RR: 18 (02 Sep 2022 12:12) (18 - 18)  SpO2: 96% (02 Sep 2022 12:12) (91% - 96%)    Parameters below as of 02 Sep 2022 12:12  Patient On (Oxygen Delivery Method): room air    PHYSICAL EXAM:  General: WN/WD NAD, Non-toxic  Neurology: A&Ox3, nonfocal  Respiratory: Clear to auscultation  - decreased breath sounds left base  CV: RRR, S1S2, no murmurs, rubs or gallops  Abdominal: Soft, Non-tender, non-distended, normal bowel sounds  Extremities: No edema, + peripheral pulses  Line Sites: Clear  Skin: No rash                        12.7   7.74  )-----------( 175      ( 02 Sep 2022 09:29 )             38.4   WBC Count: 7.74 (09-02 @ 09:29)  WBC Count: 6.02 (09-01 @ 22:38)  WBC Count: 6.39 (09-01 @ 07:24)  WBC Count: 8.61 (08-31 @ 05:43)  WBC Count: 11.70 (08-30 @ 18:54)      09-02    132<L>  |  95<L>  |  15  ----------------------------<  92  3.9   |  25  |  0.91    Ca    9.1      02 Sep 2022 09:29    TPro  x   /  Alb  x   /  TBili  0.7  /  DBili  x   /  AST  x   /  ALT  x   /  AlkPhos  x   09-02      MICROBIOLOGY:  Clean Catch Clean Catch (Midstream)  08-30-22   <10,000 CFU/mL Normal Urogenital Jossy  --  --      .Blood Blood-Peripheral  08-30-22   No growth to date.  --  --      .Blood Blood-Peripheral  08-30-22   No growth to date.  --  --      (08.31.22 @ 21:00) Legionella Antigen, Urine: Negative  (09.02.22 @ 09:29) Procalcitonin, Serum: 1.65:  (40% of peak value)   (08.31.22 @ 05:43) Procalcitonin, Serum: 4.15:  (08.30.22 @ 18:54) Procalcitonin, Serum: 3.35: T      Radiology:  images independently viewed  < from: CT Angio Chest PE Protocol w/ IV Cont (09.02.22 @ 07:38) >  IMPRESSION:    No pulmonary embolus.    Large left lower lobe consolidation consistent with known pneumonia.    Small pericardial effusion, increased from 8/2/2022.    Dilated main pulmonary artery. Correlate clinically for pulmonary   hypertension.    < end of copied text >  < from: US Abdomen Upper Quadrant Right (08.30.22 @ 21:48) >  FINDINGS:  Liver: 2.4 cm left and 4.3 cm right hepatic lobe cysts.  Bile ducts: Normal caliber. Common bile duct measures 4 mm.  Gallbladder: Contracted. No cholelithiasis. No pericholecystic fluid.   Negative sonographic Fitch sign.  Pancreas: Visualized portions are within normal limits.  Right kidney: 10.5 cm. No hydronephrosis. 1.1 cm interpolar cyst.  Ascites: None.  IVC: Visualized portions are within normal limits.    IMPRESSION:  Bilobar hepatic cysts.    < end of copied text >  < from: Xray Chest 2 Views PA/Lat (08.30.22 @ 18:56) >  IMPRESSION:    Left lower lobe patchyopacity, concerning for pneumonia.    < end of copied text >      Haim Huang MD; Division of Infectious Disease; Pager: [phone]; nights and weekends: [phone]

## 2022-09-03 LAB
ANION GAP SERPL CALC-SCNC: 10 MMOL/L — SIGNIFICANT CHANGE UP (ref 5–17)
BUN SERPL-MCNC: 11 MG/DL — SIGNIFICANT CHANGE UP (ref 7–23)
CALCIUM SERPL-MCNC: 9 MG/DL — SIGNIFICANT CHANGE UP (ref 8.4–10.5)
CHLORIDE SERPL-SCNC: 97 MMOL/L — SIGNIFICANT CHANGE UP (ref 96–108)
CO2 SERPL-SCNC: 28 MMOL/L — SIGNIFICANT CHANGE UP (ref 22–31)
CREAT SERPL-MCNC: 0.91 MG/DL — SIGNIFICANT CHANGE UP (ref 0.5–1.3)
EGFR: 69 ML/MIN/1.73M2 — SIGNIFICANT CHANGE UP
GLUCOSE SERPL-MCNC: 92 MG/DL — SIGNIFICANT CHANGE UP (ref 70–99)
MRSA PCR RESULT.: SIGNIFICANT CHANGE UP
POTASSIUM SERPL-MCNC: 3.8 MMOL/L — SIGNIFICANT CHANGE UP (ref 3.5–5.3)
POTASSIUM SERPL-SCNC: 3.8 MMOL/L — SIGNIFICANT CHANGE UP (ref 3.5–5.3)
PROCALCITONIN SERPL-MCNC: 1.1 NG/ML — HIGH (ref 0.02–0.1)
S AUREUS DNA NOSE QL NAA+PROBE: SIGNIFICANT CHANGE UP
SODIUM SERPL-SCNC: 135 MMOL/L — SIGNIFICANT CHANGE UP (ref 135–145)

## 2022-09-03 PROCEDURE — 99232 SBSQ HOSP IP/OBS MODERATE 35: CPT

## 2022-09-03 PROCEDURE — 71046 X-RAY EXAM CHEST 2 VIEWS: CPT | Mod: 26

## 2022-09-03 RX ORDER — BENZOCAINE AND MENTHOL 5; 1 G/100ML; G/100ML
1 LIQUID ORAL EVERY 4 HOURS
Refills: 0 | Status: DISCONTINUED | OUTPATIENT
Start: 2022-09-03 | End: 2022-09-05

## 2022-09-03 RX ADMIN — Medication 1200 MILLIGRAM(S): at 17:33

## 2022-09-03 RX ADMIN — ANASTROZOLE 1 MILLIGRAM(S): 1 TABLET ORAL at 12:44

## 2022-09-03 RX ADMIN — OXYCODONE HYDROCHLORIDE 5 MILLIGRAM(S): 5 TABLET ORAL at 22:00

## 2022-09-03 RX ADMIN — Medication 2.4 GRAM(S): at 12:40

## 2022-09-03 RX ADMIN — Medication 975 MILLIGRAM(S): at 05:48

## 2022-09-03 RX ADMIN — OXYCODONE HYDROCHLORIDE 5 MILLIGRAM(S): 5 TABLET ORAL at 22:30

## 2022-09-03 RX ADMIN — BENZOCAINE AND MENTHOL 1 LOZENGE: 5; 1 LIQUID ORAL at 22:00

## 2022-09-03 RX ADMIN — Medication 3 MILLILITER(S): at 05:11

## 2022-09-03 RX ADMIN — Medication 3 MILLILITER(S): at 17:33

## 2022-09-03 RX ADMIN — Medication 1 PATCH: at 12:19

## 2022-09-03 RX ADMIN — Medication 1 PATCH: at 06:03

## 2022-09-03 RX ADMIN — Medication 3 MILLILITER(S): at 13:21

## 2022-09-03 RX ADMIN — Medication 1 PATCH: at 19:25

## 2022-09-03 RX ADMIN — Medication 975 MILLIGRAM(S): at 13:20

## 2022-09-03 RX ADMIN — CEFEPIME 100 MILLIGRAM(S): 1 INJECTION, POWDER, FOR SOLUTION INTRAMUSCULAR; INTRAVENOUS at 05:10

## 2022-09-03 RX ADMIN — CEFEPIME 100 MILLIGRAM(S): 1 INJECTION, POWDER, FOR SOLUTION INTRAMUSCULAR; INTRAVENOUS at 21:13

## 2022-09-03 RX ADMIN — Medication 1200 MILLIGRAM(S): at 05:14

## 2022-09-03 RX ADMIN — CEFEPIME 100 MILLIGRAM(S): 1 INJECTION, POWDER, FOR SOLUTION INTRAMUSCULAR; INTRAVENOUS at 15:01

## 2022-09-03 NOTE — PROGRESS NOTE ADULT - SUBJECTIVE AND OBJECTIVE BOX
Follow-up Pulm Progress Note - [facility] [place]    Pt feels better.  Fevers are abating.  Cough is becoming dryer    Medications:  MEDICATIONS  (STANDING):  albuterol/ipratropium for Nebulization 3 milliLiter(s) Nebulizer every 6 hours  anastrozole 1 milliGRAM(s) Oral daily  cefepime   IVPB      cefepime   IVPB 1000 milliGRAM(s) IV Intermittent every 8 hours  enoxaparin Injectable 40 milliGRAM(s) SubCutaneous every 24 hours  guaiFENesin ER 1200 milliGRAM(s) Oral every 12 hours  lidocaine   4% Patch 1 Patch Transdermal daily  mesalamine DR (24-Hour) Tablet 2.4 Gram(s) Oral daily  nicotine - 21 mG/24Hr(s) Patch 1 Patch Transdermal daily    MEDICATIONS  (PRN):  acetaminophen     Tablet .. 975 milliGRAM(s) Oral every 8 hours PRN Temp greater or equal to 38C (100.4F), Mild Pain (1 - 3), Moderate Pain (4 - 6)  aluminum hydroxide/magnesium hydroxide/simethicone Suspension 30 milliLiter(s) Oral every 4 hours PRN Dyspepsia  diazepam    Tablet 10 milliGRAM(s) Oral daily PRN anxiety  melatonin 3 milliGRAM(s) Oral at bedtime PRN Insomnia  ondansetron Injectable 4 milliGRAM(s) IV Push every 8 hours PRN Nausea and/or Vomiting  oxyCODONE    IR 5 milliGRAM(s) Oral every 4 hours PRN Severe Pain (7 - 10)        Vital Signs Last 24 Hrs  T(C): 37.2 (03 Sep 2022 09:16), Max: 38.4 (02 Sep 2022 21:46)  T(F): 98.9 (03 Sep 2022 09:16), Max: 101.1 (02 Sep 2022 21:46)  HR: 100 (03 Sep 2022 09:16) (90 - 108)  BP: 114/75 (03 Sep 2022 09:16) (103/70 - 153/94)  BP(mean): --  RR: 18 (03 Sep 2022 09:16) (18 - 18)  SpO2: 93% (03 Sep 2022 09:16) (92% - 98%)    Parameters below as of 03 Sep 2022 09:16  Patient On (Oxygen Delivery Method): nasal cannula  O2 Flow (L/min): 2             @ 07:01  -   @ 07:00  --------------------------------------------------------  IN: 700 mL / OUT: 1 mL / NET: 699 mL          LABS:                        12.7   7.74  )-----------( 175      ( 02 Sep 2022 09:29 )             38.4         135  |  97  |  11  ----------------------------<  92  3.8   |  28  |  0.91    Ca    9.0      03 Sep 2022 07:28    TPro  x   /  Alb  x   /  TBili  0.7  /  DBili  x   /  AST  x   /  ALT  x   /  AlkPhos  x           CAPILLARY BLOOD GLUCOSE        PT/INR - ( 02 Sep 2022 06:35 )   PT: 12.3 sec;   INR: 1.06 ratio           Urinalysis Basic - ( 02 Sep 2022 07:55 )    Color: Light Yellow / Appearance: Clear / S.018 / pH: x  Gluc: x / Ketone: Trace  / Bili: Negative / Urobili: Negative   Blood: x / Protein: Trace / Nitrite: Negative   Leuk Esterase: Negative / RBC: 3 /hpf / WBC 2 /HPF   Sq Epi: x / Non Sq Epi: 14 /hpf / Bacteria: Negative      Procalcitonin, Serum: 1.10 ng/mL (22 @ 07:28)  Procalcitonin, Serum: 1.65 ng/mL (22 @ 09:29)        CULTURES:  Culture Results:   No growth to date. ( @ 22:30)  Culture Results:   No growth to date. ( @ 22:30)  Culture Results:   <10,000 CFU/mL Normal Urogenital Jossy ( @ 21:14)  Culture Results:   No growth to date. ( @ 18:37)  Culture Results:   No growth to date. ( @ 18:20)    Most recent blood culture --  @ 18:24   -- -- .Sputum Sputum  @ 18:24  Most recent blood culture --  @ 22:30   -- -- .Blood Blood-Peripheral  @ 22:30  Most recent blood culture --  @ 21:14   -- -- Clean Catch Clean Catch (Midstream)  @ 21:14  Most recent blood culture --  @ 18:37   -- -- .Blood Blood-Peripheral  @ 18:37  Most recent blood culture --  @ 18:20   -- -- .Blood Blood-Peripheral  @ 18:20      Physical Examination:  Awake and alert  Normocephalic atraumatic  NECK: supple, normal range of motion, no use of accessory muscles  PULM: Clear to auscultation bilaterally, with rales/rhonchi at left base  CVS: Regular rate and rhythm, no murmurs, rubs, or gallops  Abd:  soft, non tender  Extrem: No CCE

## 2022-09-03 NOTE — PROGRESS NOTE ADULT - PROBLEM SELECTOR PLAN 1
hypoxic to 89%RA in ED 2/2 LLL PNA, CTPE neg  - treat PNA as below   - c/w supplemental O2 and wean as tolerated - currently on 2L NC

## 2022-09-03 NOTE — PROGRESS NOTE ADULT - SUBJECTIVE AND OBJECTIVE BOX
67yPatient is a 67y old  Female who presents with a chief complaint of tachycardia (02 Sep 2022 13:48)      Interval history:  Seen in bed. Feeling a bit better. Temp down  No n/v/d  does have some headaches intermittent. none now.       Antimicrobials:    cefepime   IVPB      cefepime   IVPB 1000 milliGRAM(s) IV Intermittent every 8 hours    MEDICATIONS  (STANDING):  acetaminophen     Tablet .. 975 every 8 hours PRN  albuterol/ipratropium for Nebulization 3 every 6 hours  aluminum hydroxide/magnesium hydroxide/simethicone Suspension 30 every 4 hours PRN  anastrozole 1 daily  diazepam    Tablet 10 daily PRN  enoxaparin Injectable 40 every 24 hours  guaiFENesin ER 1200 every 12 hours  melatonin 3 at bedtime PRN  mesalamine DR (24-Hour) Tablet 2.4 daily  ondansetron Injectable 4 every 8 hours PRN  oxyCODONE    IR 5 every 4 hours PRN        Vital Signs Last 24 Hrs  T(C): 37.2 (09-03-22 @ 09:16), Max: 38.4 (09-02-22 @ 21:46)  T(F): 98.9 (09-03-22 @ 09:16), Max: 101.1 (09-02-22 @ 21:46)  HR: 100 (09-03-22 @ 09:16) (90 - 108)  BP: 114/75 (09-03-22 @ 09:16) (103/70 - 153/94)  BP(mean): --  RR: 18 (09-03-22 @ 09:16) (18 - 18)  SpO2: 93% (09-03-22 @ 09:16) (92% - 98%)      PHYSICAL EXAM:  General: WN/WD NAD, Non-toxic  Neurology: A&Ox3, nonfocal  Respiratory:  decreased breath sounds left base  CV: RRR, S1S2, no murmurs, rubs or gallops  Abdominal: Soft, Non-tender, non-distended, normal bowel sounds  Extremities: No edema, + peripheral pulses  Line Sites: Clear  Skin: No rash                          12.7   7.74  )-----------( 175      ( 02 Sep 2022 09:29 )             38.4   09-03    135  |  97  |  11  ----------------------------<  92  3.8   |  28  |  0.91    Ca    9.0      03 Sep 2022 07:28    TPro  x   /  Alb  x   /  TBili  0.7  /  DBili  x   /  AST  x   /  ALT  x   /  AlkPhos  x   09-02          RECENT CULTURES:    Culture - Sputum (collected 02 Sep 2022 18:24)  Source: .Sputum Sputum  Gram Stain (02 Sep 2022 23:31):    Rare polymorphonuclear leukocytes per low power field    Few Squamous epithelial cells per low power field    Rare Gram Negative Rods per oil power field    Rare Gram Variable Cocci per oil power field    Culture - Blood (collected 01 Sep 2022 22:30)  Source: .Blood Blood-Peripheral  Preliminary Report (03 Sep 2022 01:02):    No growth to date.    Culture - Blood (collected 01 Sep 2022 22:30)  Source: .Blood Blood-Peripheral  Preliminary Report (03 Sep 2022 01:02):    No growth to date.          --    --  08-30 @ 18:37  .Blood Blood-Peripheral  --  --  --    No growth to date.  --  08-30 @ 18:20  .Blood Blood-Peripheral  --  --  --    No growth to date.  --      Radiology:    < from: CT Angio Chest PE Protocol w/ IV Cont (09.02.22 @ 07:38) >  IMPRESSION:    No pulmonary embolus.    Large left lower lobe consolidation consistent with known pneumonia.    Small pericardial effusion, increased from 8/2/2022.    Dilated main pulmonary artery. Correlate clinically for pulmonary   hypertension.    < end of copied text >

## 2022-09-03 NOTE — PROGRESS NOTE ADULT - ASSESSMENT
67F with COPD, lung nodules,  breast cancer (L sided, in 2000 and 2019, s/p lumpectomies x 2), UC on mesalamine, current smoker p/w tachycardia admitted 8/31/22 with LLL pneumonia. She has had some symptomatic improvement, resolution of modest leukocytosis and >50% decline in serial ProCalcitonin levels but has persistent fevers. Chest exam consistent with the dense consolidation on chest ct with absent breath sounds left lower posterior lung fields.  Clinical presentation unlikely to represent MRSA pneumonia    Antibiotics  Ceftriaxone 8/30  Azithromycin 8/30 --> 9/1  Vanco 9/2-->  Cefepime 9/2-->    Suggest  Continue Cefepime - fever curve possibly better.   bronchodilator, acappella device, vigorous chest PT  May consider change to po Levofloxacin 750 mg po daily in a few days when afebrile  CONTINUE Antibiotics through 9/8/22    d/w team  ID available this weekend if needed.  call x [phone] with questions    Nancy Wilson MD  [phone] (pager)  [phone] (office)

## 2022-09-03 NOTE — PROGRESS NOTE ADULT - SUBJECTIVE AND OBJECTIVE BOX
[facility] Division of Hospital Medicine  Courtney Alexander DO  Available on Teams Jeri    Patient is a 67y old  Female who presents with a chief complaint of tachycardia (03 Sep 2022 11:08)      SUBJECTIVE / OVERNIGHT EVENTS: none. Patient feels much better. Really wants to go home. No SOB. Has cough if she takes a deep breath in. Last fever yesterday, 101F  ADDITIONAL REVIEW OF SYSTEMS: negative    MEDICATIONS  (STANDING):  albuterol/ipratropium for Nebulization 3 milliLiter(s) Nebulizer every 6 hours  anastrozole 1 milliGRAM(s) Oral daily  cefepime   IVPB      cefepime   IVPB 1000 milliGRAM(s) IV Intermittent every 8 hours  enoxaparin Injectable 40 milliGRAM(s) SubCutaneous every 24 hours  guaiFENesin ER 1200 milliGRAM(s) Oral every 12 hours  lidocaine   4% Patch 1 Patch Transdermal daily  mesalamine DR (24-Hour) Tablet 2.4 Gram(s) Oral daily  nicotine - 21 mG/24Hr(s) Patch 1 Patch Transdermal daily    MEDICATIONS  (PRN):  acetaminophen     Tablet .. 975 milliGRAM(s) Oral every 8 hours PRN Temp greater or equal to 38C (100.4F), Mild Pain (1 - 3), Moderate Pain (4 - 6)  aluminum hydroxide/magnesium hydroxide/simethicone Suspension 30 milliLiter(s) Oral every 4 hours PRN Dyspepsia  diazepam    Tablet 10 milliGRAM(s) Oral daily PRN anxiety  melatonin 3 milliGRAM(s) Oral at bedtime PRN Insomnia  ondansetron Injectable 4 milliGRAM(s) IV Push every 8 hours PRN Nausea and/or Vomiting  oxyCODONE    IR 5 milliGRAM(s) Oral every 4 hours PRN Severe Pain (7 - 10)      CAPILLARY BLOOD GLUCOSE        I&O's Summary    02 Sep 2022 07:01  -  03 Sep 2022 07:00  --------------------------------------------------------  IN: 700 mL / OUT: 1 mL / NET: 699 mL    03 Sep 2022 07:01  -  03 Sep 2022 13:51  --------------------------------------------------------  IN: 960 mL / OUT: 0 mL / NET: 960 mL        PHYSICAL EXAM:  Vital Signs Last 24 Hrs  T(C): 37.2 (03 Sep 2022 09:16), Max: 38.4 (02 Sep 2022 21:46)  T(F): 98.9 (03 Sep 2022 09:16), Max: 101.1 (02 Sep 2022 21:46)  HR: 100 (03 Sep 2022 09:16) (92 - 108)  BP: 114/75 (03 Sep 2022 09:16) (103/70 - 153/94)  BP(mean): --  RR: 18 (03 Sep 2022 09:16) (18 - 18)  SpO2: 93% (03 Sep 2022 09:16) (92% - 98%)    Parameters below as of 03 Sep 2022 09:16  Patient On (Oxygen Delivery Method): nasal cannula  O2 Flow (L/min): 2      CONSTITUTIONAL: Well-groomed, in no apparent distress  EYES: No conjunctival or scleral injection, non-icteric; PERRLA and symmetric  ENMT: No external nasal lesions; no pharyngeal injection or exudates, oral mucosa with moist membranes  NECK: Trachea midline without palpable neck mass; thyroid not enlarged and non-tender  RESPIRATORY: Breathing comfortably; lungs CTA without wheeze/rhonchi/rales except at left lower lobe (decreased sounds)  CARDIOVASCULAR: +S1S2, RRR, no M/G/R; pedal pulses full and symmetric; no lower extremity edema  GASTROINTESTINAL: No palpable masses or tenderness, +BS throughout, no rebound/guarding; no hepatosplenomegaly; no hernia palpated  MUSCULOSKELETAL: no digital clubbing or cyanosis; no paraspinal tenderness; normal strength and tone of extremities  SKIN: No rashes or ulcers noted; no subcutaneous nodules or induration palpable  NEUROLOGIC: CN II-XII intact; sensation intact in LEs b/l to light touch  PSYCHIATRIC: A+O x 3; mood and affect appropriate; appropriate insight and judgment    LABS:                        12.7   7.74  )-----------( 175      ( 02 Sep 2022 09:29 )             38.4     09-03    135  |  97  |  11  ----------------------------<  92  3.8   |  28  |  0.91    Ca    9.0      03 Sep 2022 07:28    TPro  x   /  Alb  x   /  TBili  0.7  /  DBili  x   /  AST  x   /  ALT  x   /  AlkPhos  x       PT/INR - ( 02 Sep 2022 06:35 )   PT: 12.3 sec;   INR: 1.06 ratio               Urinalysis Basic - ( 02 Sep 2022 07:55 )    Color: Light Yellow / Appearance: Clear / S.018 / pH: x  Gluc: x / Ketone: Trace  / Bili: Negative / Urobili: Negative   Blood: x / Protein: Trace / Nitrite: Negative   Leuk Esterase: Negative / RBC: 3 /hpf / WBC 2 /HPF   Sq Epi: x / Non Sq Epi: 14 /hpf / Bacteria: Negative        Culture - Sputum (collected 02 Sep 2022 18:24)  Source: .Sputum Sputum  Gram Stain (02 Sep 2022 23:31):    Rare polymorphonuclear leukocytes per low power field    Few Squamous epithelial cells per low power field    Rare Gram Negative Rods per oil power field    Rare Gram Variable Cocci per oil power field    Culture - Blood (collected 01 Sep 2022 22:30)  Source: .Blood Blood-Peripheral  Preliminary Report (03 Sep 2022 01:02):    No growth to date.    Culture - Blood (collected 01 Sep 2022 22:30)  Source: .Blood Blood-Peripheral  Preliminary Report (03 Sep 2022 01:02):    No growth to date.

## 2022-09-03 NOTE — PROGRESS NOTE ADULT - PROBLEM SELECTOR PLAN 2
- broadened to cefepime 9/2  - c/w O2 supplementation as above  - trend fever curve, WBC, procal  - ID and pulm following, apprec recs - broadened to cefepime 9/2  - c/w O2 supplementation as above  - trend fever curve, WBC, procal  - ID and pulm following, apprec recs  - incentive spirometry

## 2022-09-03 NOTE — PROGRESS NOTE ADULT - ASSESSMENT
ASSESSMENT:    67 year old gentlewoman, current smoker, followed by Dr. Juan M Quintero of our practice for an abnormal chest CT with multiple lung nodules and COPD. Findings on a previous PET scan were non-specific. She has a history of breast cancer s/p 2 lumpectomies of the left breast in 2000 and 2019 with XRT. She is hyperthyroid due to a goiter. She developed fatigue, malaise weakness, anorexia and an unsteady gait ~ 1 week ago. She had a negative COVID test on Monday, August 29th. She was seen in our office the following day for a routine PFT study. She was sent to the ER looking unwell with tachycardia. While in the hospital the patient has had high fevers associated with chills and sweats. She has mild shortness of breath with stable oxygenation on a 2lpm nasal canula. She has a cough but has been unable to expectorate. She has no chest congestion and wheeze. She has left sided chest pain exacerbated by deep inspiration. Symptoms continue despite treatment with ceftriaxone and azithromycin    The patient has near complete consolidation of the left lower lobe with "air bronchograms" on CT scan - there is no evidence of significant mucous plugging or an endobronchial lesion to prevent resolution of the infection - there is a minimal parapneumonic effusion - of concern are the ongoing fevers despite treatment for typical community acquired pneumonia with ceftriaxone and azithromycin     PLAN/RECOMMENDATIONS:    check oxygen level on room air  sputum culture is pending  appreciate ID input, cefepime day #2, she is finally afebrile  continue albuterol/atrovent nebs q6h and mucinex 1200mg 2 times daily   analgesics for ongoing headache and pleuritic chest pain  nicotine patch  watch for alcohol withdrawal  Dr. Juan M Quintero of our office with follow the pulmonary nodules in the outpatient setting and obtain tissue as indicated  will repeat cxr pa/lateral today to follow up on the infiltrate from a chest xray standpoint    Courtney Moss MD, [facility]  [phone]  Pulmonary Medicine

## 2022-09-04 LAB
ANION GAP SERPL CALC-SCNC: 11 MMOL/L — SIGNIFICANT CHANGE UP (ref 5–17)
BUN SERPL-MCNC: 10 MG/DL — SIGNIFICANT CHANGE UP (ref 7–23)
CALCIUM SERPL-MCNC: 8.9 MG/DL — SIGNIFICANT CHANGE UP (ref 8.4–10.5)
CHLORIDE SERPL-SCNC: 100 MMOL/L — SIGNIFICANT CHANGE UP (ref 96–108)
CO2 SERPL-SCNC: 25 MMOL/L — SIGNIFICANT CHANGE UP (ref 22–31)
CREAT SERPL-MCNC: 0.78 MG/DL — SIGNIFICANT CHANGE UP (ref 0.5–1.3)
CULTURE RESULTS: SIGNIFICANT CHANGE UP
EGFR: 83 ML/MIN/1.73M2 — SIGNIFICANT CHANGE UP
GLUCOSE SERPL-MCNC: 88 MG/DL — SIGNIFICANT CHANGE UP (ref 70–99)
HCT VFR BLD CALC: 29.7 % — LOW (ref 34.5–45)
HGB BLD-MCNC: 10 G/DL — LOW (ref 11.5–15.5)
MAGNESIUM SERPL-MCNC: 1.8 MG/DL — SIGNIFICANT CHANGE UP (ref 1.6–2.6)
MCHC RBC-ENTMCNC: 32.4 PG — SIGNIFICANT CHANGE UP (ref 27–34)
MCHC RBC-ENTMCNC: 33.7 GM/DL — SIGNIFICANT CHANGE UP (ref 32–36)
MCV RBC AUTO: 96.1 FL — SIGNIFICANT CHANGE UP (ref 80–100)
NRBC # BLD: 0 /100 WBCS — SIGNIFICANT CHANGE UP (ref 0–0)
PHOSPHATE SERPL-MCNC: 2.4 MG/DL — LOW (ref 2.5–4.5)
PLATELET # BLD AUTO: 219 K/UL — SIGNIFICANT CHANGE UP (ref 150–400)
POTASSIUM SERPL-MCNC: 3.6 MMOL/L — SIGNIFICANT CHANGE UP (ref 3.5–5.3)
POTASSIUM SERPL-SCNC: 3.6 MMOL/L — SIGNIFICANT CHANGE UP (ref 3.5–5.3)
RBC # BLD: 3.09 M/UL — LOW (ref 3.8–5.2)
RBC # FLD: 15.4 % — HIGH (ref 10.3–14.5)
SODIUM SERPL-SCNC: 136 MMOL/L — SIGNIFICANT CHANGE UP (ref 135–145)
SPECIMEN SOURCE: SIGNIFICANT CHANGE UP
WBC # BLD: 7.01 K/UL — SIGNIFICANT CHANGE UP (ref 3.8–10.5)
WBC # FLD AUTO: 7.01 K/UL — SIGNIFICANT CHANGE UP (ref 3.8–10.5)

## 2022-09-04 PROCEDURE — 99232 SBSQ HOSP IP/OBS MODERATE 35: CPT

## 2022-09-04 RX ORDER — SODIUM,POTASSIUM PHOSPHATES 278-250MG
1 POWDER IN PACKET (EA) ORAL
Refills: 0 | Status: DISCONTINUED | OUTPATIENT
Start: 2022-09-04 | End: 2022-09-04

## 2022-09-04 RX ADMIN — Medication 1200 MILLIGRAM(S): at 05:15

## 2022-09-04 RX ADMIN — CEFEPIME 100 MILLIGRAM(S): 1 INJECTION, POWDER, FOR SOLUTION INTRAMUSCULAR; INTRAVENOUS at 05:14

## 2022-09-04 RX ADMIN — Medication 3 MILLILITER(S): at 00:11

## 2022-09-04 RX ADMIN — Medication 975 MILLIGRAM(S): at 13:45

## 2022-09-04 RX ADMIN — CEFEPIME 100 MILLIGRAM(S): 1 INJECTION, POWDER, FOR SOLUTION INTRAMUSCULAR; INTRAVENOUS at 15:19

## 2022-09-04 RX ADMIN — Medication 3 MILLILITER(S): at 12:58

## 2022-09-04 RX ADMIN — ANASTROZOLE 1 MILLIGRAM(S): 1 TABLET ORAL at 12:59

## 2022-09-04 RX ADMIN — Medication 63.75 MILLIMOLE(S): at 12:57

## 2022-09-04 RX ADMIN — Medication 3 MILLILITER(S): at 23:50

## 2022-09-04 RX ADMIN — BENZOCAINE AND MENTHOL 1 LOZENGE: 5; 1 LIQUID ORAL at 13:19

## 2022-09-04 RX ADMIN — Medication 3 MILLILITER(S): at 05:15

## 2022-09-04 RX ADMIN — Medication 975 MILLIGRAM(S): at 22:00

## 2022-09-04 RX ADMIN — Medication 975 MILLIGRAM(S): at 05:54

## 2022-09-04 RX ADMIN — Medication 3 MILLILITER(S): at 18:22

## 2022-09-04 RX ADMIN — Medication 2.4 GRAM(S): at 12:59

## 2022-09-04 RX ADMIN — Medication 975 MILLIGRAM(S): at 21:30

## 2022-09-04 RX ADMIN — Medication 1200 MILLIGRAM(S): at 18:22

## 2022-09-04 RX ADMIN — CEFEPIME 100 MILLIGRAM(S): 1 INJECTION, POWDER, FOR SOLUTION INTRAMUSCULAR; INTRAVENOUS at 21:20

## 2022-09-04 RX ADMIN — Medication 975 MILLIGRAM(S): at 13:15

## 2022-09-04 NOTE — PROGRESS NOTE ADULT - PROBLEM SELECTOR PLAN 8
lovenox for VTE ppx  fall, aspiration precautions   regular diet

## 2022-09-04 NOTE — PROGRESS NOTE ADULT - PROBLEM SELECTOR PLAN 2
- broadened to cefepime 9/2  - c/w O2 supplementation as above  - trend fever curve, WBC, procal  - ID and pulm following, apprec recs  - will keep for at least 72h of IV antibiotics, upon discharge will take levaquin until 9/8  - incentive spirometry

## 2022-09-04 NOTE — PROGRESS NOTE ADULT - ASSESSMENT
ASSESSMENT:    67 year old gentlewoman, current smoker, followed by Dr. Juan M Quintero of our practice for an abnormal chest CT with multiple lung nodules and COPD. Findings on a previous PET scan were non-specific. She has a history of breast cancer s/p 2 lumpectomies of the left breast in 2000 and 2019 with XRT. She is hyperthyroid due to a goiter. She developed fatigue, malaise weakness, anorexia and an unsteady gait ~ 1 week ago. She had a negative COVID test on Monday, August 29th. She was seen in our office the following day for a routine PFT study. She was sent to the ER looking unwell with tachycardia. While in the hospital the patient has had high fevers associated with chills and sweats. She has mild shortness of breath with stable oxygenation on a 2lpm nasal canula. She has a cough but has been unable to expectorate. She has no chest congestion and wheeze. She has left sided chest pain exacerbated by deep inspiration. Symptoms continue despite treatment with ceftriaxone and azithromycin    The patient has near complete consolidation of the left lower lobe with "air bronchograms" on CT scan - there is no evidence of significant mucous plugging or an endobronchial lesion to prevent resolution of the infection - there is a minimal parapneumonic effusion - of concern are the ongoing fevers despite treatment for typical community acquired pneumonia with ceftriaxone and azithromycin     PLAN/RECOMMENDATIONS:    check oxygen level on room air  sputum culture is pending  appreciate ID input, cefepime day #3, she is finally afebrile, plan to change to levaquin po tomorrow as per ID recc  continue albuterol/atrovent nebs q6h and mucinex 1200mg 2 times daily   analgesics for ongoing headache and pleuritic chest pain  nicotine patch  watch for alcohol withdrawal  Dr. Juan M Quintero of our office with follow the pulmonary nodules in the outpatient setting and obtain tissue as indicated  cxr may be slightly worse, but finally now on adequate therapy  agree with checking oxygen level on room air upon ambulation, suspect it will stay above 90%    would prefer patient remain in hospital for a full 72 hours of adequate iv therapy, her pneumonia is quite extensive, and should be observed until tomorrow.  If remains afebrile and oxygen level is good, would place on 4 days of levaquin 750mg daily.    Courtney Moss MD, FCCP  [phone]  Pulmonary Medicine

## 2022-09-04 NOTE — PROGRESS NOTE ADULT - SUBJECTIVE AND OBJECTIVE BOX
[facility] Division of Hospital Medicine  Courtney Alexander DO  Available on Teams Jeri    Patient is a 67y old  Female who presents with a chief complaint of tachycardia (04 Sep 2022 10:25)      SUBJECTIVE / OVERNIGHT EVENTS: none. Patient feels well. Still has a bit of a cough.   ADDITIONAL REVIEW OF SYSTEMS: negative    MEDICATIONS  (STANDING):  albuterol/ipratropium for Nebulization 3 milliLiter(s) Nebulizer every 6 hours  anastrozole 1 milliGRAM(s) Oral daily  cefepime   IVPB      cefepime   IVPB 1000 milliGRAM(s) IV Intermittent every 8 hours  enoxaparin Injectable 40 milliGRAM(s) SubCutaneous every 24 hours  guaiFENesin ER 1200 milliGRAM(s) Oral every 12 hours  lidocaine   4% Patch 1 Patch Transdermal daily  mesalamine DR (24-Hour) Tablet 2.4 Gram(s) Oral daily  nicotine - 21 mG/24Hr(s) Patch 1 Patch Transdermal daily    MEDICATIONS  (PRN):  acetaminophen     Tablet .. 975 milliGRAM(s) Oral every 8 hours PRN Temp greater or equal to 38C (100.4F), Mild Pain (1 - 3), Moderate Pain (4 - 6)  aluminum hydroxide/magnesium hydroxide/simethicone Suspension 30 milliLiter(s) Oral every 4 hours PRN Dyspepsia  benzocaine 15 mG/menthol 3.6 mG Lozenge 1 Lozenge Oral every 4 hours PRN Sore Throat  diazepam    Tablet 10 milliGRAM(s) Oral daily PRN anxiety  melatonin 3 milliGRAM(s) Oral at bedtime PRN Insomnia  ondansetron Injectable 4 milliGRAM(s) IV Push every 8 hours PRN Nausea and/or Vomiting  oxyCODONE    IR 5 milliGRAM(s) Oral every 4 hours PRN Severe Pain (7 - 10)      CAPILLARY BLOOD GLUCOSE        I&O's Summary    03 Sep 2022 07:01  -  04 Sep 2022 07:00  --------------------------------------------------------  IN: 1180 mL / OUT: 0 mL / NET: 1180 mL    04 Sep 2022 07:01  -  04 Sep 2022 14:58  --------------------------------------------------------  IN: 720 mL / OUT: 0 mL / NET: 720 mL        PHYSICAL EXAM:  Vital Signs Last 24 Hrs  T(C): 36.6 (04 Sep 2022 11:23), Max: 37.6 (04 Sep 2022 01:03)  T(F): 97.9 (04 Sep 2022 11:23), Max: 99.7 (04 Sep 2022 01:03)  HR: 84 (04 Sep 2022 11:23) (84 - 99)  BP: 118/82 (04 Sep 2022 11:23) (118/82 - 134/82)  BP(mean): --  RR: 18 (04 Sep 2022 11:23) (18 - 18)  SpO2: 100% (04 Sep 2022 11:23) (92% - 100%)    Parameters below as of 04 Sep 2022 11:23  Patient On (Oxygen Delivery Method): room air    CONSTITUTIONAL: Well-groomed, in no apparent distress  EYES: No conjunctival or scleral injection, non-icteric; PERRLA and symmetric  ENMT: No external nasal lesions; no pharyngeal injection or exudates, oral mucosa with moist membranes  NECK: Trachea midline without palpable neck mass; thyroid not enlarged and non-tender  RESPIRATORY: Breathing comfortably; lungs CTA without wheeze/rhonchi/rales except at left lower lobe (decreased sounds)  CARDIOVASCULAR: +S1S2, RRR, no M/G/R; pedal pulses full and symmetric; no lower extremity edema  GASTROINTESTINAL: No palpable masses or tenderness, +BS throughout, no rebound/guarding; no hepatosplenomegaly; no hernia palpated  MUSCULOSKELETAL: no digital clubbing or cyanosis; no paraspinal tenderness; normal strength and tone of extremities  SKIN: No rashes or ulcers noted; no subcutaneous nodules or induration palpable  NEUROLOGIC: CN II-XII intact; sensation intact in LEs b/l to light touch  PSYCHIATRIC: A+O x 3; mood and affect appropriate; appropriate insight and judgment    LABS:                        10.0   7.01  )-----------( 219      ( 04 Sep 2022 07:15 )             29.7     09-04    136  |  100  |  10  ----------------------------<  88  3.6   |  25  |  0.78    Ca    8.9      04 Sep 2022 07:03  Phos  2.4     09-04  Mg     1.8     09-04                Culture - Sputum (collected 02 Sep 2022 18:24)  Source: .Sputum Sputum  Gram Stain (02 Sep 2022 23:31):    Rare polymorphonuclear leukocytes per low power field    Few Squamous epithelial cells per low power field    Rare Gram Negative Rods per oil power field    Rare Gram Variable Cocci per oil power field  Preliminary Report (03 Sep 2022 14:40):    Normal Respiratory Jossy present    Culture - Blood (collected 01 Sep 2022 22:30)  Source: .Blood Blood-Peripheral  Preliminary Report (03 Sep 2022 01:02):    No growth to date.    Culture - Blood (collected 01 Sep 2022 22:30)  Source: .Blood Blood-Peripheral  Preliminary Report (03 Sep 2022 01:02):    No growth to date.

## 2022-09-04 NOTE — PROGRESS NOTE ADULT - NSPROGADDITIONALINFOA_GEN_ALL_CORE
Plan discussed with ACP ***    Courtneymac Alexander, DO  Available on Teams riaz
Plan discussed with ACP Liliana Alxeander, DO  Available on Teams riaz

## 2022-09-04 NOTE — PROGRESS NOTE ADULT - PROBLEM SELECTOR PLAN 1
hypoxic to 89%RA in ED 2/2 LLL PNA, CTPE neg  - treat PNA as below   - c/w supplemental O2 and wean as tolerated - currently on room air SpO2 90-92%  - ambulate on room air today

## 2022-09-04 NOTE — PROGRESS NOTE ADULT - SUBJECTIVE AND OBJECTIVE BOX
Follow-up Pulm Progress Note - [facility] [place]    No new respiratory events overnight.  Still with cough, but this am is feeling better and stronger    Medications:  MEDICATIONS  (STANDING):  albuterol/ipratropium for Nebulization 3 milliLiter(s) Nebulizer every 6 hours  anastrozole 1 milliGRAM(s) Oral daily  cefepime   IVPB      cefepime   IVPB 1000 milliGRAM(s) IV Intermittent every 8 hours  enoxaparin Injectable 40 milliGRAM(s) SubCutaneous every 24 hours  guaiFENesin ER 1200 milliGRAM(s) Oral every 12 hours  lidocaine   4% Patch 1 Patch Transdermal daily  mesalamine DR (24-Hour) Tablet 2.4 Gram(s) Oral daily  nicotine - 21 mG/24Hr(s) Patch 1 Patch Transdermal daily  sodium phosphate IVPB 15 milliMole(s) IV Intermittent once    MEDICATIONS  (PRN):  acetaminophen     Tablet .. 975 milliGRAM(s) Oral every 8 hours PRN Temp greater or equal to 38C (100.4F), Mild Pain (1 - 3), Moderate Pain (4 - 6)  aluminum hydroxide/magnesium hydroxide/simethicone Suspension 30 milliLiter(s) Oral every 4 hours PRN Dyspepsia  benzocaine 15 mG/menthol 3.6 mG Lozenge 1 Lozenge Oral every 4 hours PRN Sore Throat  diazepam    Tablet 10 milliGRAM(s) Oral daily PRN anxiety  melatonin 3 milliGRAM(s) Oral at bedtime PRN Insomnia  ondansetron Injectable 4 milliGRAM(s) IV Push every 8 hours PRN Nausea and/or Vomiting  oxyCODONE    IR 5 milliGRAM(s) Oral every 4 hours PRN Severe Pain (7 - 10)             Vital Signs Last 24 Hrs  T(C): 37.2 (04 Sep 2022 04:44), Max: 37.6 (04 Sep 2022 01:03)  T(F): 99 (04 Sep 2022 04:44), Max: 99.7 (04 Sep 2022 01:03)  HR: 99 (04 Sep 2022 04:44) (95 - 99)  BP: 129/90 (04 Sep 2022 04:44) (129/90 - 134/82)  BP(mean): --  RR: 18 (04 Sep 2022 04:44) (18 - 18)  SpO2: 95% (04 Sep 2022 04:44) (92% - 95%)    Parameters below as of 04 Sep 2022 04:44  Patient On (Oxygen Delivery Method): nasal cannula  O2 Flow (L/min): 2            09-03 @ 07:01  -  09-04 @ 07:00  --------------------------------------------------------  IN: 1180 mL / OUT: 0 mL / NET: 1180 mL          LABS:                        10.0   7.01  )-----------( 219      ( 04 Sep 2022 07:15 )             29.7     09-04    136  |  100  |  10  ----------------------------<  88  3.6   |  25  |  0.78    Ca    8.9      04 Sep 2022 07:03  Phos  2.4     09-04  Mg     1.8     09-04          CAPILLARY BLOOD GLUCOSE            Procalcitonin, Serum: 1.10 ng/mL (09-03-22 @ 07:28)  Procalcitonin, Serum: 1.65 ng/mL (09-02-22 @ 09:29)        CULTURES:  Culture Results:   Normal Respiratory Jossy present (09-02 @ 18:24)  Culture Results:   No growth to date. (09-01 @ 22:30)  Culture Results:   No growth to date. (09-01 @ 22:30)  Culture Results:   <10,000 CFU/mL Normal Urogenital Jossy (08-30 @ 21:14)  Culture Results:   No growth to date. (08-30 @ 18:37)  Culture Results:   No growth to date. (08-30 @ 18:20)    Most recent blood culture -- 09-02 @ 18:24   -- -- .Sputum Sputum 09-02 @ 18:24  Most recent blood culture -- 09-01 @ 22:30   -- -- .Blood Blood-Peripheral 09-01 @ 22:30  Most recent blood culture -- 08-30 @ 21:14   -- -- Clean Catch Clean Catch (Midstream) 08-30 @ 21:14  Most recent blood culture -- 08-30 @ 18:37   -- -- .Blood Blood-Peripheral 08-30 @ 18:37  Most recent blood culture -- 08-30 @ 18:20   -- -- .Blood Blood-Peripheral 08-30 @ 18:20      Physical Examination:  Awake and alert  Normocephalic atraumatic  NECK: supple, normal range of motion, no use of accessory muscles  PULM: Clear to auscultation bilaterally, with rales/rhonchi left base  CVS: Regular rate and rhythm, no murmurs, rubs, or gallops  Abd:  soft, non tender  Extrem: No CCE    RADIOLOGY REVIEWED  CXR: prelim yesterday, extensive left lower lobe pneumonia, may be slightly worse than original, but unchanged since ct chest

## 2022-09-05 ENCOUNTER — TRANSCRIPTION ENCOUNTER (OUTPATIENT)
Age: 67
End: 2022-09-05

## 2022-09-05 VITALS
DIASTOLIC BLOOD PRESSURE: 88 MMHG | OXYGEN SATURATION: 95 % | SYSTOLIC BLOOD PRESSURE: 130 MMHG | RESPIRATION RATE: 18 BRPM | TEMPERATURE: 98 F | HEART RATE: 91 BPM

## 2022-09-05 LAB
ANION GAP SERPL CALC-SCNC: 13 MMOL/L — SIGNIFICANT CHANGE UP (ref 5–17)
BUN SERPL-MCNC: 18 MG/DL — SIGNIFICANT CHANGE UP (ref 7–23)
CALCIUM SERPL-MCNC: 9 MG/DL — SIGNIFICANT CHANGE UP (ref 8.4–10.5)
CHLORIDE SERPL-SCNC: 102 MMOL/L — SIGNIFICANT CHANGE UP (ref 96–108)
CO2 SERPL-SCNC: 24 MMOL/L — SIGNIFICANT CHANGE UP (ref 22–31)
CREAT SERPL-MCNC: 0.67 MG/DL — SIGNIFICANT CHANGE UP (ref 0.5–1.3)
EGFR: 96 ML/MIN/1.73M2 — SIGNIFICANT CHANGE UP
GLUCOSE SERPL-MCNC: 87 MG/DL — SIGNIFICANT CHANGE UP (ref 70–99)
HCT VFR BLD CALC: 26.6 % — LOW (ref 34.5–45)
HGB BLD-MCNC: 9.1 G/DL — LOW (ref 11.5–15.5)
MAGNESIUM SERPL-MCNC: 1.9 MG/DL — SIGNIFICANT CHANGE UP (ref 1.6–2.6)
MCHC RBC-ENTMCNC: 32.4 PG — SIGNIFICANT CHANGE UP (ref 27–34)
MCHC RBC-ENTMCNC: 34.2 GM/DL — SIGNIFICANT CHANGE UP (ref 32–36)
MCV RBC AUTO: 94.7 FL — SIGNIFICANT CHANGE UP (ref 80–100)
NRBC # BLD: 0 /100 WBCS — SIGNIFICANT CHANGE UP (ref 0–0)
PHOSPHATE SERPL-MCNC: 3 MG/DL — SIGNIFICANT CHANGE UP (ref 2.5–4.5)
PLATELET # BLD AUTO: 332 K/UL — SIGNIFICANT CHANGE UP (ref 150–400)
POTASSIUM SERPL-MCNC: 4.1 MMOL/L — SIGNIFICANT CHANGE UP (ref 3.5–5.3)
POTASSIUM SERPL-SCNC: 4.1 MMOL/L — SIGNIFICANT CHANGE UP (ref 3.5–5.3)
RBC # BLD: 2.81 M/UL — LOW (ref 3.8–5.2)
RBC # FLD: 15.3 % — HIGH (ref 10.3–14.5)
SODIUM SERPL-SCNC: 139 MMOL/L — SIGNIFICANT CHANGE UP (ref 135–145)
WBC # BLD: 6.31 K/UL — SIGNIFICANT CHANGE UP (ref 3.8–10.5)
WBC # FLD AUTO: 6.31 K/UL — SIGNIFICANT CHANGE UP (ref 3.8–10.5)

## 2022-09-05 PROCEDURE — 83735 ASSAY OF MAGNESIUM: CPT

## 2022-09-05 PROCEDURE — 76705 ECHO EXAM OF ABDOMEN: CPT

## 2022-09-05 PROCEDURE — 84100 ASSAY OF PHOSPHORUS: CPT

## 2022-09-05 PROCEDURE — 87641 MR-STAPH DNA AMP PROBE: CPT

## 2022-09-05 PROCEDURE — 87086 URINE CULTURE/COLONY COUNT: CPT

## 2022-09-05 PROCEDURE — 96375 TX/PRO/DX INJ NEW DRUG ADDON: CPT

## 2022-09-05 PROCEDURE — 0225U NFCT DS DNA&RNA 21 SARSCOV2: CPT

## 2022-09-05 PROCEDURE — 85018 HEMOGLOBIN: CPT

## 2022-09-05 PROCEDURE — 84132 ASSAY OF SERUM POTASSIUM: CPT

## 2022-09-05 PROCEDURE — 96374 THER/PROPH/DIAG INJ IV PUSH: CPT

## 2022-09-05 PROCEDURE — 84436 ASSAY OF TOTAL THYROXINE: CPT

## 2022-09-05 PROCEDURE — 87449 NOS EACH ORGANISM AG IA: CPT

## 2022-09-05 PROCEDURE — 85025 COMPLETE CBC W/AUTO DIFF WBC: CPT

## 2022-09-05 PROCEDURE — 87070 CULTURE OTHR SPECIMN AEROBIC: CPT

## 2022-09-05 PROCEDURE — 80048 BASIC METABOLIC PNL TOTAL CA: CPT

## 2022-09-05 PROCEDURE — 82803 BLOOD GASES ANY COMBINATION: CPT

## 2022-09-05 PROCEDURE — 87040 BLOOD CULTURE FOR BACTERIA: CPT

## 2022-09-05 PROCEDURE — 84295 ASSAY OF SERUM SODIUM: CPT

## 2022-09-05 PROCEDURE — 81001 URINALYSIS AUTO W/SCOPE: CPT

## 2022-09-05 PROCEDURE — 86803 HEPATITIS C AB TEST: CPT

## 2022-09-05 PROCEDURE — 36415 COLL VENOUS BLD VENIPUNCTURE: CPT

## 2022-09-05 PROCEDURE — 85014 HEMATOCRIT: CPT

## 2022-09-05 PROCEDURE — 87640 STAPH A DNA AMP PROBE: CPT

## 2022-09-05 PROCEDURE — 85027 COMPLETE CBC AUTOMATED: CPT

## 2022-09-05 PROCEDURE — 82140 ASSAY OF AMMONIA: CPT

## 2022-09-05 PROCEDURE — 85610 PROTHROMBIN TIME: CPT

## 2022-09-05 PROCEDURE — 84145 PROCALCITONIN (PCT): CPT

## 2022-09-05 PROCEDURE — 83605 ASSAY OF LACTIC ACID: CPT

## 2022-09-05 PROCEDURE — 99239 HOSP IP/OBS DSCHRG MGMT >30: CPT

## 2022-09-05 PROCEDURE — 82330 ASSAY OF CALCIUM: CPT

## 2022-09-05 PROCEDURE — 93306 TTE W/DOPPLER COMPLETE: CPT

## 2022-09-05 PROCEDURE — 80053 COMPREHEN METABOLIC PANEL: CPT

## 2022-09-05 PROCEDURE — 84480 ASSAY TRIIODOTHYRONINE (T3): CPT

## 2022-09-05 PROCEDURE — 82435 ASSAY OF BLOOD CHLORIDE: CPT

## 2022-09-05 PROCEDURE — 84443 ASSAY THYROID STIM HORMONE: CPT

## 2022-09-05 PROCEDURE — 71046 X-RAY EXAM CHEST 2 VIEWS: CPT

## 2022-09-05 PROCEDURE — 71275 CT ANGIOGRAPHY CHEST: CPT

## 2022-09-05 PROCEDURE — 94640 AIRWAY INHALATION TREATMENT: CPT

## 2022-09-05 PROCEDURE — 82947 ASSAY GLUCOSE BLOOD QUANT: CPT

## 2022-09-05 PROCEDURE — 85730 THROMBOPLASTIN TIME PARTIAL: CPT

## 2022-09-05 PROCEDURE — 99285 EMERGENCY DEPT VISIT HI MDM: CPT

## 2022-09-05 RX ORDER — CIPROFLOXACIN LACTATE 400MG/40ML
1 VIAL (ML) INTRAVENOUS
Qty: 4 | Refills: 0
Start: 2022-09-05 | End: 2022-09-08

## 2022-09-05 RX ORDER — BENZOCAINE AND MENTHOL 5; 1 G/100ML; G/100ML
1 LIQUID ORAL
Qty: 42 | Refills: 0
Start: 2022-09-05 | End: 2022-09-11

## 2022-09-05 RX ADMIN — Medication 1200 MILLIGRAM(S): at 05:16

## 2022-09-05 RX ADMIN — Medication 2.4 GRAM(S): at 12:20

## 2022-09-05 RX ADMIN — Medication 975 MILLIGRAM(S): at 06:32

## 2022-09-05 RX ADMIN — CEFEPIME 100 MILLIGRAM(S): 1 INJECTION, POWDER, FOR SOLUTION INTRAMUSCULAR; INTRAVENOUS at 05:15

## 2022-09-05 RX ADMIN — ANASTROZOLE 1 MILLIGRAM(S): 1 TABLET ORAL at 12:20

## 2022-09-05 RX ADMIN — Medication 3 MILLILITER(S): at 11:43

## 2022-09-05 RX ADMIN — Medication 3 MILLILITER(S): at 05:18

## 2022-09-05 NOTE — PROGRESS NOTE ADULT - ASSESSMENT
ASSESSMENT:    67 year old gentlewoman, current smoker, followed by Dr. Juan M Quintero of our practice for an abnormal chest CT with multiple lung nodules and COPD. Findings on a previous PET scan were non-specific. She has a history of breast cancer s/p 2 lumpectomies of the left breast in 2000 and 2019 with XRT. She is hyperthyroid due to a goiter. She developed fatigue, malaise weakness, anorexia and an unsteady gait ~ 1 week ago. She had a negative COVID test on Monday, August 29th. She was seen in our office the following day for a routine PFT study. She was sent to the ER looking unwell with tachycardia. While in the hospital the patient has had high fevers associated with chills and sweats. She has mild shortness of breath with stable oxygenation on a 2lpm nasal canula. She has a cough but has been unable to expectorate. She has no chest congestion and wheeze. She has left sided chest pain exacerbated by deep inspiration. Symptoms continue despite treatment with ceftriaxone and azithromycin    The patient has near complete consolidation of the left lower lobe with "air bronchograms" on CT scan - there is no evidence of significant mucous plugging or an endobronchial lesion to prevent resolution of the infection - there is a minimal parapneumonic effusion - of concern are the ongoing fevers despite treatment for typical community acquired pneumonia with ceftriaxone and azithromycin     PLAN/RECOMMENDATIONS:    check oxygen level on room air  sputum culture is pending  appreciate ID input, finished 3 days IV cefepime day, change to levaquin po for 4 more days  mucinex 1200mg 2 times daily, prn  no need for nebulizers at home  nicotine patch  Dr. Juan M Quintero of our office with follow the pulmonary nodules in the outpatient setting and obtain tissue as indicated  her oxygen level has improved, she is okay on room air    She is ready for discharge.  She will follow up with Dr. Quintero in office this week or next for a repeat cxr.  This is an extensive pneumonia and she may feel short of breath for several weeks with a continued cough.  She has been instructed to call our office with any questions or concerns.    Courtney Moss MD, [facility]  [phone]  Pulmonary Medicine

## 2022-09-05 NOTE — CHART NOTE - NSCHARTNOTEFT_GEN_A_CORE
CC: Recurrent Fever      HPI:  Called by RN to relate pt with oral temp 102.6  Pt given 1 gram IV Tylenol, and seen and examined at bedside  She appeared in NAD, denied having cp, sob, dizziness, headache, abd pain, n/v, or dysuria  She felt no chills         ROS:  CONSTITUTIONAL:  + fever,  no chills or rigors  CARDIOVASCULAR:  No chest pain or palpitations  RESPIRATORY:   No SOB, + cough  GASTROINTESTINAL:  No abd pain, N/V, diarrhea/constipation  EXTREMITIES:  No swelling or joint pain  GENITOURINARY:  No burning on urination, increased frequency or urgency.  No flank pain  NEUROLOGIC:  No HA, visual disturbances  SKIN: No rashes        PAST MEDICAL & SURGICAL HISTORY:  Breast cancer  History of ulcerative colitis  Acute respiratory failure with hypoxia  Sepsis  Pneumonia of left lower lobe due to infectious organism  Anxiety  Suspected deep vein thrombosis (DVT)  Suspected pulmonary embolism  Tension headache  H/O lumpectomy  Hyponatremia  Hyperbilirubinemia        Vital Signs Last 24 Hrs  T(C): 39.2 (01 Sep 2022 20:34), Max: 39.2 (01 Sep 2022 20:34)  T(F): 102.6 (01 Sep 2022 20:34), Max: 102.6 (01 Sep 2022 20:34)  HR: 104 (01 Sep 2022 20:34) (85 - 105)  BP: 126/85 (01 Sep 2022 20:34) (113/81 - 126/85)  BP(mean): --  RR: 18 (01 Sep 2022 20:34) (18 - 18)  SpO2: 92% (01 Sep 2022 20:34) (91% - 97%)    Parameters below as of 01 Sep 2022 20:34  Patient On (Oxygen Delivery Method): room air      Physical Exam:  General: WN/WD NAD, AOx3, nontoxic appearing  CV: RRR, S1S2 , no murmurs  Respiratory: Rt basilar rales appreciated, diminished BS  Lt base; nonlabored breathing, good inspiratory effort  Abdominal: (+) bowel sounds x4. Soft, Non tender, non distended  Genitourinary: No Dutton   MSK: No BLLE edema, + peripheral pulses, FROM all 4 extremity  Skin: (+) warm, dry   Psych: Appropriate affect       Labs:                        12.3   6.02  )-----------( 166      ( 01 Sep 2022 22:38 )             35.7     09-01    133<L>  |  96  |  14  ----------------------------<  93  3.9   |  24  |  0.88    Ca    9.1      01 Sep 2022 07:24    TPro  7.1  /  Alb  3.4  /  TBili  1.3<H>  /  DBili  x   /  AST  36  /  ALT  20  /  AlkPhos  65  08-31    Lactate, Blood (09.01.22 @ 22:38)    Lactate, Blood: 1.0 mmol/L      Urinalysis Basic - ( 08-30 @ 21:14 )    Color: Negative / Appearance: Negative / SG: -- / pH: Negative  Gluc: Small / Ketone: Yellow  / Bili: -- / Urobili: 1   Blood: 2 / Protein: -- / Nitrite: Trace   Leuk Esterase: Negative / RBC: 1.013 / WBC --   Sq Epi: 100 / Non Sq Epi: 1 / Bacteria: 6.0      Radiology:  < from: Xray Chest 2 Views PA/Lat (08.30.22 @ 18:56) >    IMPRESSION:    Left lower lobe patchy opacity, concerning for pneumonia.      Assessment & Plan:  HPI:  67F with PMH breast cancer (L sided, in 2000 and 2019, s/p lumpectomies x 2), UC on mesalamine, current smoker, sent to ED from pulm office for tachycardia, found to have LLL consolidation, admitted for hypoxic respiratory failure 2/2 PNA.     Pt seen tonight for high fever of 102.6, and given 1 Gm IV Tylenol  No chills or rigors noted, and pt did not appear toxic, remained hemodynamically stable  WBC remains normal, serum lactate 1.0  Pt remains on 2L NC and O2 sat > 92%; pt was a smoker, and stopped last Saturday, as she stated  Pt has been treated for PNA, currently on Rocephin    PLAN:  >Continue to monitor  >Trend fever curve  >PO/IV Tylenol for fever  >BCx X 2 sent  >U/A pending  >RVP/COVID swab sent  >Continue Rocephin 1 Gm q 24 Hrs. for CAP  >Endorse to day team to follow up
Patient seen and examined. Has a cough but breathing is comfortable. Has been walking around w/o issue, not short of breath. She will take levaquin until 9/8 at home. Ambulatory saturation on room air was high 90s. We discussed that her hgb levels have dropped during the course of the hospitalization - she says she has not had any BMs and doesn't feel dizzy or lightheaded. Have not checked for hemolysis on bloodwork however she is not jaundiced, conjunctiva is white (points away from elevated bilirubin). Her last colonoscopy was earlier this year, she gets them regularly due to her ulcerative colitis diagnosis. Told her I would consult a gastroenterologist to see her here in the hospital who might offer a scope for the hemoglobin drop but she said she did not want it done at this time. Advised her to f/u with her PCP and gastroenterologist within 2 weeks after discharge. Patient medically stable for discharge w/close outpatient f/u. 35 minutes spent coordinating discharge.
Reference #: [id number]    Others' Prescriptions  Patient Name: Gabrielle Hudson Date: 1955  Address: [address] [address]Euc: Female  Rx Written	Rx Dispensed	Drug	Quantity	Days Supply	Prescriber Name	Prescriber Sherron #	Payment Method	Dispenser  07/20/2022	07/20/2022	diazepam 10 mg tablet	120	30	Arely Leonard MD	[id number]	Medicare	[id number]  03/10/2022	03/12/2022	oxycodone hcl (ir) 5 mg tablet	30	7	Arely Leonard MD	[id number]	Medicare	[id number]  01/27/2022	02/08/2022	diazepam 10 mg tablet	120	30	Arely Leonard MD	[id number]	Medicare	[id number]  10/28/2021	10/29/2021	diazepam 10 mg tablet	120	30	Arely Leonard MD	[id number]	Medicare	[id number]  09/10/2021	09/15/2021	diazepam 5 mg tablet	60	20	Arely Leonard MD	[id number]	Medicare	[id number]

## 2022-09-05 NOTE — DISCHARGE NOTE PROVIDER - CARE PROVIDER_API CALL
Juan M Quintero  CRITICAL CARE MEDICINE  [address] [facility], [address]  [place] [address]  Phone: [phone]  Fax: [phone]  Follow Up Time: 1 week    Jose Mckeon (DO)  Cardiovascular Disease; Internal Medicine; Nuclear Cardiology  [address] [facility], [address]  [place] [address]  Phone: [phone]  Fax: [phone]  Follow Up Time: 1 week   Juan M Quintero  CRITICAL CARE MEDICINE  [address] [facility], [address]  [place] [address]  Phone: [phone]  Fax: [phone]  Follow Up Time: 1 week    Jose Mckeon (DO)  Cardiovascular Disease; Internal Medicine; Nuclear Cardiology  [address] [facility], [address]  [place] [address]  Phone: [phone]  Fax: [phone]  Follow Up Time: 1 week    Arely Leonard)  Internal Medicine  [address] [place] [address], [address]  [place] [address]  Phone: [phone]  Fax: [phone]  Follow Up Time: 1 week

## 2022-09-05 NOTE — DISCHARGE NOTE PROVIDER - HOSPITAL COURSE
67F with PMH breast cancer (L sided, in 2000 and 2019, s/p lumpectomies x 2), UC on mesalamine, current smoker, sent to ED from pulm office for tachycardia, found to have LLL consolidation, admitted for hypoxic respiratory failure 2/2 PNA.      Problem/Plan - 1:  ·  Problem: Acute respiratory failure with hypoxia.   ·  Plan: hypoxic to 89%RA in ED 2/2 LLL PNA, CTPE neg  - treat PNA as below   - c/w supplemental O2 and wean as tolerated - currently on room air SpO2 90-92%  - ambulate on room air today. sat 97% on ambulation without oxygen.      Problem/Plan - 2:  ·  Problem: Pneumonia of left lower lobe due to infectious organism.   ·  Plan: - broadened to cefepime 9/2  - trend fever curve, WBC, procal  - ID and pulm following, apprec recs  - will keep for at least 72h of IV antibiotics, upon discharge will take levaquin until 9/8  - incentive spirometry.     Problem/Plan - 3:  ·  Problem: Tension headache.   ·  Plan: Headache since coming to ED, no red flag symptoms, no temporal tenderness, no nuchal rigidity  - acetaminophen prn  - oxy 5 for severe pain  - if worsens, consider nonconCTH.     Problem/Plan - 4:  ·  Problem: Ulcerative colitis.   ·  Plan: c/w home dose mesalamine.     Problem/Plan - 5:  ·  Problem: Breast cancer.   ·  Plan: c/w anastrazole.     Problem/Plan - 6:  ·  Problem: Anxiety.   ·  Plan: c/w valium 10mg prn.     Problem/Plan - 7:  ·  Problem: Sepsis.   ·  Plan: pt with fever and tachycardia on admission, with CXR showing LL opacity c/w PNA. NOW RESOLVED.    Patient is medically cleared and stable for discharge. Discussed with Dr.Song Valdez.

## 2022-09-05 NOTE — PROGRESS NOTE ADULT - SUBJECTIVE AND OBJECTIVE BOX
Follow-up Pulm Progress Note - [facility]    No new respiratory events overnight.  Denies SOB/CP. She feels better, afebrile    Medications:  MEDICATIONS  (STANDING):  albuterol/ipratropium for Nebulization 3 milliLiter(s) Nebulizer every 6 hours  anastrozole 1 milliGRAM(s) Oral daily  cefepime   IVPB      cefepime   IVPB 1000 milliGRAM(s) IV Intermittent every 8 hours  enoxaparin Injectable 40 milliGRAM(s) SubCutaneous every 24 hours  guaiFENesin ER 1200 milliGRAM(s) Oral every 12 hours  lidocaine   4% Patch 1 Patch Transdermal daily  mesalamine DR (24-Hour) Tablet 2.4 Gram(s) Oral daily  nicotine - 21 mG/24Hr(s) Patch 1 Patch Transdermal daily    MEDICATIONS  (PRN):  acetaminophen     Tablet .. 975 milliGRAM(s) Oral every 8 hours PRN Temp greater or equal to 38C (100.4F), Mild Pain (1 - 3), Moderate Pain (4 - 6)  aluminum hydroxide/magnesium hydroxide/simethicone Suspension 30 milliLiter(s) Oral every 4 hours PRN Dyspepsia  benzocaine 15 mG/menthol 3.6 mG Lozenge 1 Lozenge Oral every 4 hours PRN Sore Throat  diazepam    Tablet 10 milliGRAM(s) Oral daily PRN anxiety  melatonin 3 milliGRAM(s) Oral at bedtime PRN Insomnia  ondansetron Injectable 4 milliGRAM(s) IV Push every 8 hours PRN Nausea and/or Vomiting  oxyCODONE    IR 5 milliGRAM(s) Oral every 4 hours PRN Severe Pain (7 - 10)             Vital Signs Last 24 Hrs  T(C): 37 (05 Sep 2022 05:09), Max: 37.2 (04 Sep 2022 21:15)  T(F): 98.6 (05 Sep 2022 05:09), Max: 99 (04 Sep 2022 21:15)  HR: 80 (05 Sep 2022 05:09) (80 - 82)  BP: 126/78 (05 Sep 2022 05:09) (122/80 - 138/82)  BP(mean): --  RR: 18 (05 Sep 2022 05:09) (18 - 18)  SpO2: 97% (05 Sep 2022 09:41) (92% - 98%)    Parameters below as of 05 Sep 2022 09:41  Patient On (Oxygen Delivery Method): room air              09-04 @ 07:01  -  09-05 @ 07:00  --------------------------------------------------------  IN: 1540 mL / OUT: 0 mL / NET: 1540 mL          LABS:                        9.1    6.31  )-----------( 332      ( 05 Sep 2022 06:46 )             26.6     09-05    139  |  102  |  18  ----------------------------<  87  4.1   |  24  |  0.67    Ca    9.0      05 Sep 2022 06:47  Phos  3.0     09-05  Mg     1.9     09-05          CAPILLARY BLOOD GLUCOSE            Procalcitonin, Serum: 1.10 ng/mL (09-03-22 @ 07:28)        CULTURES:  Culture Results:   Normal Respiratory Jossy present (09-02 @ 18:24)  Culture Results:   No growth to date. (09-01 @ 22:30)  Culture Results:   No growth to date. (09-01 @ 22:30)  Culture Results:   <10,000 CFU/mL Normal Urogenital Jossy (08-30 @ 21:14)  Culture Results:   No Growth Final (08-30 @ 18:37)  Culture Results:   No Growth Final (08-30 @ 18:20)    Most recent blood culture -- 09-02 @ 18:24   -- -- .Sputum Sputum 09-02 @ 18:24  Most recent blood culture -- 09-01 @ 22:30   -- -- .Blood Blood-Peripheral 09-01 @ 22:30      Physical Examination:  Awake and alert  Normocephalic atraumatic  NECK: supple, normal range of motion, no use of accessory muscles  PULM: Clear to auscultation bilaterally, with rales 1/2 up on left, at right base  CVS: Regular rate and rhythm, no murmurs, rubs, or gallops  Abd:  soft, non tender  Extrem: No CCE    RADIOLOGY REVIEWED  CXR:  < from: Xray Chest 2 Views PA/Lat (09.03.22 @ 16:57) >  ACC: [id number] EXAM:  XR CHEST PA LAT 2V                          PROCEDURE DATE:  09/03/2022          INTERPRETATION:  XR CHEST PA AND LATERAL    INDICATION: Pneumonia. Abnormal chest CT    COMPARISON: 9/20/2022    FINDINGS:    Small left pleural effusion and consolidation within left lower lobe are   unchanged.    The cardiomediastinal silhouette is normal in size and contour.    IMPRESSION:    Small left pleural effusion and consolidation within left lower lobe are   unchanged.    --- End of Report ---    < end of copied text >    CT chest:  < from: CT Angio Chest PE Protocol w/ IV Cont (09.02.22 @ 07:38) >  ACC: [id number] EXAM:  CT ANGIO CHEST PULM ART [facility]                          PROCEDURE DATE:  09/02/2022          INTERPRETATION:  CLINICAL INFORMATION: Left lower lobe pneumonia on   antibiotics with recurrent fever. New oxygen desaturation. Concern for   pulmonary embolus.    COMPARISON: Chest radiograph dated 8/30/2022. Chest CT dated 8/2/2022.    CONTRAST/COMPLICATIONS:  IV Contrast: Omnipaque 350  80 cc administered   20 cc discarded  Oral Contrast: NONE  Complications: None reported at time of study completion    PROCEDURE:  CT Angiogram of the chest was obtained with intravenous contrast. Three   dimensional maximum intensity projection (MIP) images were generated.    FINDINGS:    PULMONARY ANGIOGRAM: No pulmonary embolus. The main pulmonary artery is   dilated, measuring up to 3.8 cm.    LYMPH NODES: Scattered prominent subcentimeter lymph nodes throughout the   mediastinum, likely reactive in nature.    HEART/VASCULATURE: The heart is normal in size. Small pericardial   effusion, increased from 8/2/2022. The thoracic aorta is normal in   caliber.    AIRWAYS/LUNGS/PLEURA: No endobronchial lesion. Large hypoattenuating left   lower lobe consolidation almost entirely filling the lobe. There is   subsegmental atelectasis of the lingula. There is also linear atelectasis   of the right upper lobe and mild right lower lobe dependent atelectasis.   Stable nodules up to 1.2 cm in the right apex. Previously new left lower   lobe nodule is now obscured by consolidation. No pleural effusion or   pneumothorax.    UPPER ABDOMEN: Hepatic cysts, incompletely imaged.    BONES/SOFT TISSUES: Mild degenerative changes of the spine.    IMPRESSION:    No pulmonary embolus.    Large left lower lobe consolidation consistent with known pneumonia.    Small pericardial effusion, increased from 8/2/2022.    Dilated main pulmonary artery. Correlate clinically for pulmonary   hypertension.    --- End of Report ---    < end of copied text >

## 2022-09-05 NOTE — DISCHARGE NOTE PROVIDER - NSDCCPCAREPLAN_GEN_ALL_CORE_FT
PRINCIPAL DISCHARGE DIAGNOSIS  Diagnosis: Pneumonia  Assessment and Plan of Treatment: Pneumonia of left lower lobe due to infectious organism.   You were on IV antibiotics- cefepime from 9/2. Now ypu will take levaquin until 9/8.  - You no longer require oxygen.  *** If you experience worsening cough, short of breath, chest pain please return to ED.         SECONDARY DISCHARGE DIAGNOSES  Diagnosis: Hyponatremia  Assessment and Plan of Treatment: Resolved.    Diagnosis: Pericardial effusion  Assessment and Plan of Treatment: Pericardial effusion - small, present since TTE of 8/2. Asymptomatic. No obvious etiology. TTE findings discussed with the patient - will need outpatient follow up with cardiology.        PRINCIPAL DISCHARGE DIAGNOSIS  Diagnosis: Pneumonia  Assessment and Plan of Treatment: Pneumonia of left lower lobe due to infectious organism.   You were on IV antibiotics- cefepime from 9/2. Now you will take levaquin until 9/8.  - You no longer require oxygen.  *** If you experience worsening cough, short of breath, chest pain please return to ED.         SECONDARY DISCHARGE DIAGNOSES  Diagnosis: Hyponatremia  Assessment and Plan of Treatment: Resolved.    Diagnosis: Pericardial effusion  Assessment and Plan of Treatment: Pericardial effusion - small, present since TTE of 8/2. Asymptomatic. No obvious etiology. TTE findings discussed with the patient - will need outpatient follow up with cardiology.

## 2022-09-05 NOTE — DISCHARGE NOTE NURSING/CASE MANAGEMENT/SOCIAL WORK - NSDCPEFALRISK_GEN_ALL_CORE
For information on Fall & Injury Prevention, visit: [email or web address] OR  [email or web address] OR  https://www.cdc.gov/steadi/patient.html

## 2022-09-05 NOTE — DISCHARGE NOTE PROVIDER - NSDCFUSCHEDAPPT_GEN_ALL_CORE_FT
Jennifer Zavaleta  [facility] Physician [address]  Scheduled Appointment: 09/15/2022

## 2022-09-05 NOTE — DISCHARGE NOTE NURSING/CASE MANAGEMENT/SOCIAL WORK - PATIENT PORTAL LINK FT
You can access the FollowMyHealth Patient Portal offered by [facility] by registering at the following website: [email or web address]. By joining [employer]’s FollowMyHealth portal, you will also be able to view your health information using other applications (apps) compatible with our system.

## 2022-09-05 NOTE — DISCHARGE NOTE PROVIDER - CARE PROVIDERS DIRECT ADDRESSES
,DirectAddress_Unknown,DirectAddress_Unknown ,DirectAddress_Unknown,DirectAddress_Unknown,[email or web address]

## 2022-09-05 NOTE — DISCHARGE NOTE PROVIDER - NSDCMRMEDTOKEN_GEN_ALL_CORE_FT
anastrozole 1 mg oral tablet: 1 tab(s) orally once a day  mesalamine 1.2 g oral delayed release tablet: 2 tab(s) orally once a day  Valium 10 mg oral tablet: 1 tab(s) orally , As Needed   anastrozole 1 mg oral tablet: 1 tab(s) orally once a day  guaiFENesin 1200 mg oral tablet, extended release: 1 tab(s) orally every 12 hours  levoFLOXacin 750 mg oral tablet: 1 tab(s) orally once a day   menthol-benzocaine 3.6 mg-15 mg mucous membrane lozenge: 1 each orally every 4 hours, As Needed   mesalamine 1.2 g oral delayed release tablet: 2 tab(s) orally once a day  Valium 10 mg oral tablet: 1 tab(s) orally , As Needed

## 2022-09-05 NOTE — DISCHARGE NOTE PROVIDER - PROVIDER TOKENS
PROVIDER:[TOKEN:[[id number]],FOLLOWUP:[1 week]],PROVIDER:[TOKEN:[[id number]],FOLLOWUP:[1 week]] PROVIDER:[TOKEN:[[id number]],FOLLOWUP:[1 week]],PROVIDER:[TOKEN:[[id number]],FOLLOWUP:[1 week]],PROVIDER:[TOKEN:[[id number]],FOLLOWUP:[1 week]]

## 2022-09-05 NOTE — PROGRESS NOTE ADULT - PROVIDER SPECIALTY LIST ADULT
Internal Medicine
Pulmonology
Pulmonology
Infectious Disease
Pulmonology
Internal Medicine
Geriatrics
Geriatrics

## 2022-09-06 ENCOUNTER — NON-APPOINTMENT (OUTPATIENT)
Age: 67
End: 2022-09-06

## 2022-09-06 PROBLEM — C50.919 MALIGNANT NEOPLASM OF UNSPECIFIED SITE OF UNSPECIFIED FEMALE BREAST: Chronic | Status: ACTIVE | Noted: 2022-08-30

## 2022-09-06 PROBLEM — Z87.19 PERSONAL HISTORY OF OTHER DISEASES OF THE DIGESTIVE SYSTEM: Chronic | Status: ACTIVE | Noted: 2022-08-30

## 2022-09-07 ENCOUNTER — LABORATORY RESULT (OUTPATIENT)
Age: 67
End: 2022-09-07

## 2022-09-07 ENCOUNTER — APPOINTMENT (OUTPATIENT)
Dept: INTERNAL MEDICINE | Facility: CLINIC | Age: 67
End: 2022-09-07

## 2022-09-07 VITALS
OXYGEN SATURATION: 96 % | SYSTOLIC BLOOD PRESSURE: 146 MMHG | WEIGHT: 127 LBS | DIASTOLIC BLOOD PRESSURE: 80 MMHG | HEIGHT: 68 IN | BODY MASS INDEX: 19.25 KG/M2 | HEART RATE: 94 BPM

## 2022-09-07 VITALS
SYSTOLIC BLOOD PRESSURE: 145 MMHG | OXYGEN SATURATION: 98 % | BODY MASS INDEX: 19.25 KG/M2 | RESPIRATION RATE: 16 BRPM | HEART RATE: 94 BPM | DIASTOLIC BLOOD PRESSURE: 80 MMHG | TEMPERATURE: 97.7 F | HEIGHT: 68 IN | WEIGHT: 127 LBS

## 2022-09-07 DIAGNOSIS — Z01.84 ENCOUNTER FOR ANTIBODY RESPONSE EXAMINATION: ICD-10-CM

## 2022-09-07 DIAGNOSIS — J18.9 PNEUMONIA, UNSPECIFIED ORGANISM: ICD-10-CM

## 2022-09-07 LAB
CULTURE RESULTS: SIGNIFICANT CHANGE UP
CULTURE RESULTS: SIGNIFICANT CHANGE UP
SPECIMEN SOURCE: SIGNIFICANT CHANGE UP
SPECIMEN SOURCE: SIGNIFICANT CHANGE UP

## 2022-09-07 PROCEDURE — 99496 TRANSJ CARE MGMT HIGH F2F 7D: CPT | Mod: 25

## 2022-09-07 PROCEDURE — 36415 COLL VENOUS BLD VENIPUNCTURE: CPT

## 2022-09-07 RX ORDER — CELECOXIB 200 MG/1
200 CAPSULE ORAL
Qty: 60 | Refills: 0 | Status: COMPLETED | COMMUNITY
Start: 2021-10-30 | End: 2022-09-07

## 2022-09-07 RX ORDER — OXYCODONE 5 MG/1
5 TABLET ORAL
Qty: 30 | Refills: 0 | Status: COMPLETED | COMMUNITY
Start: 2022-03-10 | End: 2022-09-07

## 2022-09-07 RX ORDER — MESALAMINE 4 G/60ML
4 SUSPENSION RECTAL
Qty: 1 | Refills: 3 | Status: COMPLETED | COMMUNITY
Start: 2020-01-14 | End: 2022-09-07

## 2022-09-07 RX ORDER — DICLOFENAC SODIUM 75 MG/1
75 TABLET, DELAYED RELEASE ORAL
Qty: 60 | Refills: 0 | Status: COMPLETED | COMMUNITY
Start: 2021-11-03 | End: 2022-09-07

## 2022-09-08 LAB
BASOPHILS # BLD AUTO: 0.04 K/UL
BASOPHILS NFR BLD AUTO: 0.6 %
COVID-19 NUCLEOCAPSID  GAM ANTIBODY INTERPRETATION: NEGATIVE
EOSINOPHIL # BLD AUTO: 0.11 K/UL
EOSINOPHIL NFR BLD AUTO: 1.7 %
FERRITIN SERPL-MCNC: 612 NG/ML
FOLATE SERPL-MCNC: 6.9 NG/ML
HCT VFR BLD CALC: 31.4 %
HGB BLD-MCNC: 10.3 G/DL
IMM GRANULOCYTES NFR BLD AUTO: 4.1 %
IRON SATN MFR SERPL: 23 %
IRON SERPL-MCNC: 53 UG/DL
LYMPHOCYTES # BLD AUTO: 0.95 K/UL
LYMPHOCYTES NFR BLD AUTO: 14.5 %
MAN DIFF?: NORMAL
MCHC RBC-ENTMCNC: 32.1 PG
MCHC RBC-ENTMCNC: 32.8 GM/DL
MCV RBC AUTO: 97.8 FL
MONOCYTES # BLD AUTO: 0.27 K/UL
MONOCYTES NFR BLD AUTO: 4.1 %
NEUTROPHILS # BLD AUTO: 4.93 K/UL
NEUTROPHILS NFR BLD AUTO: 75 %
PLATELET # BLD AUTO: 632 K/UL
RBC # BLD: 3.21 M/UL
RBC # BLD: 3.21 M/UL
RBC # FLD: 15.5 %
RETICS # AUTO: 1.7 %
RETICS AGGREG/RBC NFR: 55.9 K/UL
SARS-COV-2 AB SERPL QL IA: 0.08 INDEX
T3 SERPL-MCNC: 75 NG/DL
T3FREE SERPL-MCNC: 2.01 PG/ML
T3RU NFR SERPL: 0.7 TBI
T4 FREE SERPL-MCNC: 0.7 NG/DL
T4 SERPL-MCNC: 4.9 UG/DL
TIBC SERPL-MCNC: 232 UG/DL
TSH SERPL-ACNC: 8.7 UIU/ML
UIBC SERPL-MCNC: 179 UG/DL
VIT B12 SERPL-MCNC: 665 PG/ML
WBC # FLD AUTO: 6.57 K/UL

## 2022-09-08 NOTE — REVIEW OF SYSTEMS
[Fatigue] : fatigue [Recent Change In Weight] : ~T recent weight change [Cough] : cough [Dyspnea on Exertion] : dyspnea on exertion [Chest Pain] : no chest pain [Palpitations] : no palpitations [FreeTextEntry2] : weight loss

## 2022-09-08 NOTE — HISTORY OF PRESENT ILLNESS
[Post-hospitalization from ___ Hospital] : Post-hospitalization from [unfilled] Hospital [Admitted on: ___] : The patient was admitted on [unfilled] [Discharged on ___] : discharged on [unfilled] [Discharge Summary] : discharge summary [FreeTextEntry2] : 67 year old gentlewoman, current smoker, followed by Dr. Juan M Quintero of\par our practice for an abnormal chest CT with multiple lung nodules and COPD.\par Findings on a previous PET scan were non-specific. She has a history of breast\par cancer s/p 2 lumpectomies of the left breast in 2000 and 2019 with XRT. She is\par hyperthyroid due to a goiter. She developed fatigue, malaise weakness, anorexia\par and an unsteady gait ~ 1 week ago. She had a negative COVID test on Monday,\par August 29th. She was seen in our office the following day for a routine PFT\par study. She was sent to the ER looking unwell with tachycardia. [facility] fevers associated with chills and sweats. She\par has mild shortness of breath with stable oxygenation on a 2lpm nasal canula.\par She has a cough but has been unable to expectorate. She has no chest congestion\par and wheeze. She has left sided chest pain exacerbated by deep inspiration.\par Symptoms slowly improved with ceftriaxone and azithromycin\par -was discharged on Levaquin po.\par \par She had a  CT angiogram that revealed no evidence of pulmonary embolus but did reveal a large left lower lobe consolidation as well as a small pericardial effusion.\par \par The patient has near complete consolidation of the left lower lobe with "air\par bronchograms" on CT scan - there is no evidence of significant mucous plugging\par or an endobronchial lesion to prevent resolution of the infection - there is a\par minimal parapneumonic effusion -\par \par \par Of note on her last hospital day her hematocrit had dropped from a baseline of 36-38  to a HCT of 29 and a repeat was 26.6.  Patient did not want to stay in the hospital for GI work-up.  She denies any aspirin or NSAID intake recently and no GERD\par She was constipated while in the hospital and had a smally dark black/brown  bowel movement this morning but denies any abdominal pain or dyspepsia.\par \par Since discharge from the hospital she reports no more fevers and her breathing status is slowly improving.  She has severe malaise and fatigue and some mild exertional dyspnea but her heart rate overall has come down to the high 90s and she denies any palpitations or chest pain\par treatment for typical community acquired pneumonia with ceftriaxone and\par azithromycin\par \par PLAN/RECOMMENDATIONS:\par \par check oxygen level on room air\par sputum culture is pending\par appreciate ID input, finished 3 days IV cefepime day, change to levaquin po for\par 4 more days\par mucinex 1200mg 2 times daily, prn\par no need for nebulizers at home\par nicotine patch\par Dr. Juan M Quintero of our office with follow the pulmonary nodules in the\par outpatient setting and obtain tissue as indicated\par her oxygen level has improved, she is okay on room air\par \par She will follow up with Dr. Quintero in office on 9/21/22\par this week or next for a repeat cxr. This is an extensive pneumonia and she may\par feel short of breath for several weeks with a continued cough. She has been\par \par she also has f/u with a cardiologist at [facility] Dr. Jose Mckeon on 09/22/2022\par instructed to call our office with any questions or concerns.

## 2022-09-08 NOTE — PHYSICAL EXAM
[Normal rectal exam] : was normal [None] : there was no rectal mass  [Stool Sample Taken] : a stool sample was obtained [Normal] : affect was normal and insight and judgment were intact [Stool Occult Blood] : stool negative for occult blood [Occult Blood Positive] : was negative for occult blood [Gross Blood] : no gross blood [de-identified] : thin pleasant woman appearing fatigued/ alert [de-identified] : decreased breath sounds in the LLL with some rales and occasional rhonchi in LLL/ no wheeze

## 2022-09-15 ENCOUNTER — APPOINTMENT (OUTPATIENT)
Dept: SURGERY | Facility: CLINIC | Age: 67
End: 2022-09-15

## 2022-09-15 PROCEDURE — 99213 OFFICE O/P EST LOW 20 MIN: CPT

## 2022-09-15 NOTE — PHYSICAL EXAM
[de-identified] : no cervical or supraclavicular adenopathy, trachea midline, thyroid with bilateral firm enlargement without discrete mass [Normal] : no neck adenopathy [de-identified] : Skin:  normal appearance.  no rash, nodules, vesicles, or erythema,\par Musculoskeletal:  full range of motion and no deformities appreciated\par Neurological:  grossly intact\par Psychiatric:  oriented to person, place and time with appropriate affect

## 2022-09-15 NOTE — HISTORY OF PRESENT ILLNESS
[de-identified] : Patient referred by Dr. Leonard for evaluation of enlarging thyroid goiter.  Patient was diagnosed with Graves' disease in the 1980s treated with PTU for 2 years with resolution.  Patient has been observed for a gradually enlarging thyroid goiter.  Thyroid ultrasound December 2020: Right lobe 7.3 x 3.6 x 3.4 cm, left lobe 7.4 x 3.9 x 3.5 cm, no discrete nodules or enlarged lymph nodes.  Blood work January 2022: TSH 2.9, free T4 1, total T3 75.  Patient denies dysphagia, shortness of breath or change in voice.  Patient has been treated with radiation for breast cancer twice.  Follow-up ultrasound April 2022 with slight decrease in thyroid lobes, no discrete nodules.  Patient diagnosed with pneumonia 2 weeks ago has completed antibiotics.  Reports cough and decreased appetite.  Is following up with pulmonologist next week.  I have reviewed all old and new data and available images.

## 2022-09-15 NOTE — ASSESSMENT
[FreeTextEntry1] : Patient with gradually enlarging thyroid goiter.  Patient is considering surgical excision.  Goiter stable on recent ultrasound.. I have reviewed the pathophysiology of the disease process, the area anatomy and the rationale for surgery.  I discussed the risks, benefits and alternative treatments which include but are not limited to bleeding, infection, numbness, hoarseness, hypocalcemia, scarring, and need for reoperation.  I have answered the patient's questions to their satisfaction.  We will continue to observe.  Follow-up ultrasound February 2023, RTO 6 months.  If stable will consider yearly follow-up at that point.  I have answered their questions to the best of my ability.\par

## 2022-09-19 ENCOUNTER — APPOINTMENT (OUTPATIENT)
Dept: CARE COORDINATION | Facility: HOME HEALTH | Age: 67
End: 2022-09-19

## 2022-09-19 DIAGNOSIS — R63.4 ABNORMAL WEIGHT LOSS: ICD-10-CM

## 2022-09-19 DIAGNOSIS — R05.9 COUGH, UNSPECIFIED: ICD-10-CM

## 2022-09-19 PROCEDURE — 99407 BEHAV CHNG SMOKING > 10 MIN: CPT | Mod: 95

## 2022-09-19 PROCEDURE — 99495 TRANSJ CARE MGMT MOD F2F 14D: CPT | Mod: 95

## 2022-09-19 RX ORDER — LEVOFLOXACIN 750 MG/1
750 TABLET, FILM COATED ORAL
Qty: 4 | Refills: 0 | Status: COMPLETED | COMMUNITY
Start: 2022-09-05

## 2022-09-19 RX ORDER — CEFUROXIME AXETIL 500 MG/1
500 TABLET ORAL
Qty: 14 | Refills: 0 | Status: COMPLETED | COMMUNITY
Start: 2022-06-23

## 2022-09-19 NOTE — HISTORY OF PRESENT ILLNESS
[Post-hospitalization from ___ Hospital] : Post-hospitalization from [unfilled] Hospital [Admitted on: ___] : The patient was admitted on [unfilled] [Discharged on ___] : discharged on [unfilled] [Discharge Summary] : discharge summary [Discharge Med List] : discharge medication list [Med Reconciliation] : medication reconciliation has been completed [Patient Contacted By: ____] : and contacted by [unfilled] [Home] : at home, [unfilled] , at the time of the visit. [Other Location: e.g. Home (Enter Location, City,State)___] : at [unfilled] [Verbal consent obtained from patient] : the patient, [unfilled] [FreeTextEntry2] : 67F with PMH breast cancer (L sided, in 2000 and 2019, s/p lumpectomies x 2), UC on mesalamine, current smoker, sent to ED from pulm office for tachycardia, found to have LLL consolidation, admitted for hypoxic respiratory failure 2/2 PNA. \par \par Since dc she continues to feel fatigued and has a cough.  She finished her PO levaquin, but continues to have a cough when she lays flat.  She stopped taking her mucinex last week because she said her appetite has been off and she did not want the mucinex to bother her stomach.  I encouraged her to take the mucinex, and to try and et small snacks with it.  She has lost 10 lbs since 8/31 and states her tastes buds have felt off.  She also says she has been having small bowel movements and she started taking miralax.  I recommended her to try probiotics as well.  TCM pulse ox sent, Refusing HC RN and PT at this time.  She is a smoker but has not smoked since 8/31 6-7 cigarettes a day smoker.  We discussed nicotine replacement, nicotine cessations programs, and she declined at this time but has the resources if needed.  She saw PMD Dr. Leonard on 9/7 and is seeing pulm Dr. Quintero on 9/21.

## 2022-09-19 NOTE — PHYSICAL EXAM
[No Acute Distress] : no acute distress [No Respiratory Distress] : no respiratory distress  [de-identified] : per TH

## 2022-09-19 NOTE — COUNSELING
[Cessation strategies including cessation program discussed] : Cessation strategies including cessation program discussed [Use of nicotine replacement therapies and other medications discussed] : Use of nicotine replacement therapies and other medications discussed [Encouraged to pick a quit date and identify support needed to quit] : Encouraged to pick a quit date and identify support needed to quit [Yes] : Willing to quit smoking [FreeTextEntry3] : 12

## 2022-11-03 ENCOUNTER — APPOINTMENT (OUTPATIENT)
Dept: ORTHOPEDIC SURGERY | Facility: CLINIC | Age: 67
End: 2022-11-03

## 2022-11-03 DIAGNOSIS — S52.132A DISPLACED FRACTURE OF NECK OF LEFT RADIUS, INITIAL ENCOUNTER FOR CLOSED FRACTURE: ICD-10-CM

## 2022-11-03 PROCEDURE — 73080 X-RAY EXAM OF ELBOW: CPT | Mod: LT

## 2022-11-03 PROCEDURE — 99213 OFFICE O/P EST LOW 20 MIN: CPT

## 2022-11-03 NOTE — DISCUSSION/SUMMARY
[de-identified] : 67-year-old woman 8 months out from nonoperative management of left radial head fracture, now with possible rheumatoid arthritis flare.\par -Long discussion was held with the patient regarding the x-ray findings and physical exam findings.  I explained that the sudden onset of vague muscular and joint pain is not consistent with a healed radial head fracture.  Upon further discussion she explained that she had somewhat similar symptoms in the past and had been treated for rheumatoid arthritis.  I recommended that she follow-up with her rheumatologist for further evaluation and discussion of treatment options.  In the meantime we will have her start back up with physical therapy and do home stretches which were demonstrated in the office today.  We will also start a trial of NSAIDs.\par -Mobic for pain\par -Home exercises: stretches of the elbow and upper arm.  These were demonstrated in the office today.\par -Begin Physical Therapy to decrease pain and improve ROM of the left elbow\par -Advised patient to see a Rheumatologist \par -Follow-up prn with x-rays at that time of the elbow\par -All of her questions and concerns were addressed.

## 2022-11-03 NOTE — HISTORY OF PRESENT ILLNESS
[de-identified] : Ms. MATHEUS TIERNEY is a 67 y.o. woman who presents today for follow-up of minimally displaced left radial head fracture that occurred while rolling skating on 2/28/22. She was feeling great until 2 weeks ago when she developed pain and limited ROM of the left elbow without injury or trauma.  She woke up 1 morning and had pain in the left elbow.  She has not been able to reach behind her head/back. She also complains of swelling in her arm. She also complains of pain in her upper arm. She has been taking Advil 800 mg as needed as well as using biofreeze with some relief. She had been doing Physical Therapy, however, stopped in July due to scheduling issues. She states she was diagnosed with rheumatoid arthritis in the 90s and took medication for this for some time. She denies any upper extremity numbness or tingling.

## 2022-11-03 NOTE — PHYSICAL EXAM
[de-identified] : X-rays of the left elbow were taken in the office today, 11/3/22.  AP, oblique, radial head view, lateral.  X-rays show good overall alignment of the elbow.  There is good alignment of the radiocapitellar joint.  Good alignment of the radial head fracture with no displacement. [de-identified] : The patient is sitting comfortably in the exam room. \par Left elbow\par -Skin is intact, very minimal swelling, no ecchymosis\par -Pronation 90, supination 90\par -Range of motion elbow 5-110\par -Elbow is stable to varus valgus stress\par -Vague tenderness over the medial and lateral aspect of the elbow spanning from the mid forearm into the distal aspect of the arm\par -Able to make a fist, no swelling of the fingers\par -Minimal tenderness to palpation over the radial head\par -Sensation is intact median, radial, ulnar, axillary nerves\par -Motor is intact median, radial, ulnar, axillary nerves\par -Hand is warm and well-perfused, Palpable radial and ulnar pulses

## 2022-11-30 ENCOUNTER — OUTPATIENT (OUTPATIENT)
Dept: OUTPATIENT SERVICES | Facility: HOSPITAL | Age: 67
LOS: 1 days | End: 2022-11-30
Payer: MEDICARE

## 2022-11-30 ENCOUNTER — APPOINTMENT (OUTPATIENT)
Dept: CT IMAGING | Facility: IMAGING CENTER | Age: 67
End: 2022-11-30

## 2022-11-30 DIAGNOSIS — J18.9 PNEUMONIA, UNSPECIFIED ORGANISM: ICD-10-CM

## 2022-11-30 DIAGNOSIS — J44.9 CHRONIC OBSTRUCTIVE PULMONARY DISEASE, UNSPECIFIED: ICD-10-CM

## 2022-11-30 DIAGNOSIS — Z98.890 OTHER SPECIFIED POSTPROCEDURAL STATES: Chronic | ICD-10-CM

## 2022-11-30 DIAGNOSIS — R93.89 ABNORMAL FINDINGS ON DIAGNOSTIC IMAGING OF OTHER SPECIFIED BODY STRUCTURES: ICD-10-CM

## 2022-11-30 PROCEDURE — 71250 CT THORAX DX C-: CPT

## 2022-11-30 PROCEDURE — 71250 CT THORAX DX C-: CPT | Mod: 26,MH

## 2022-12-19 ENCOUNTER — NON-APPOINTMENT (OUTPATIENT)
Age: 67
End: 2022-12-19

## 2022-12-21 ENCOUNTER — NON-APPOINTMENT (OUTPATIENT)
Age: 67
End: 2022-12-21

## 2022-12-22 ENCOUNTER — LABORATORY RESULT (OUTPATIENT)
Age: 67
End: 2022-12-22

## 2022-12-22 ENCOUNTER — APPOINTMENT (OUTPATIENT)
Dept: INTERNAL MEDICINE | Facility: CLINIC | Age: 67
End: 2022-12-22
Payer: MEDICARE

## 2022-12-22 VITALS
HEIGHT: 68 IN | BODY MASS INDEX: 20.46 KG/M2 | SYSTOLIC BLOOD PRESSURE: 130 MMHG | WEIGHT: 135 LBS | DIASTOLIC BLOOD PRESSURE: 80 MMHG

## 2022-12-22 DIAGNOSIS — M79.676 PAIN IN UNSPECIFIED FOOT: ICD-10-CM

## 2022-12-22 DIAGNOSIS — K29.70 GASTRITIS, UNSPECIFIED, W/OUT BLEEDING: ICD-10-CM

## 2022-12-22 DIAGNOSIS — M79.673 PAIN IN UNSPECIFIED FOOT: ICD-10-CM

## 2022-12-22 DIAGNOSIS — M25.469 EFFUSION, UNSPECIFIED KNEE: ICD-10-CM

## 2022-12-22 PROCEDURE — 99213 OFFICE O/P EST LOW 20 MIN: CPT | Mod: 25

## 2022-12-22 PROCEDURE — 36415 COLL VENOUS BLD VENIPUNCTURE: CPT

## 2022-12-23 LAB — T3RU NFR SERPL: 0.5 TBI

## 2022-12-23 RX ORDER — MELOXICAM 15 MG/1
15 TABLET ORAL
Qty: 30 | Refills: 0 | Status: COMPLETED | COMMUNITY
Start: 2022-11-03 | End: 2022-12-23

## 2023-01-01 NOTE — ED CDU PROVIDER INITIAL DAY NOTE - CARDIAC, MLM
[facility]  Circumcision Procedure Note    Date of Admission: 2023  Date of Service:  23  Time of Service:  14:58 EST  Patient Name: Michelle Jason  :  2023  MRN:  [id number]    Informed consent:  The parents were informed of the risks, benefits, complications, medications and alternatives of the circumcision with the parent(s)/legal guardian and consent was given.     Time out performed: Yes    Procedure Details:  Informed consent was obtained. Examination of the external anatomical structures was normal. Analgesia was obtained by using 24% Sucrose solution PO and 1% Lidocaine (0.8cc) administered by using a 27 g needle at 9 and 3 o'clock. Penis and surrounding area prepped w/betadine in sterile fashion, fenestrated drape used. Hemostat clamps applied, adhesions released with hemostats.  The Mogan clamp was applied.  Foreskin removed above clamp with scalpel.  The clamp was removed and the skin was retracted to the base of the glans.  Any further adhesions were  from the glans. Hemostasis was obtained. petroleum jelly was applied to the penis.     Complications:  None; patient tolerated the procedure well.    Plan: dress with petroleum jelly for 1 day.    Procedure performed by: MD Hannah Powell MD  2023  14:58 EST    
Normal rate, regular rhythm.  Heart sounds S1, S2.  No murmurs, rubs or gallops.

## 2023-01-02 NOTE — DISCUSSION/SUMMARY
[de-identified] : 67-year-old woman 12 weeks out from nonoperative management of left radial head fracture, doing well.\par \par -Continue Range of motion exercises at home\par -Physical Therapy script provided\par -Meloxicam rx provided\par -Tylenol for pain\par -Follow-up prn with x-rays at that time of the elbow\par -All of her questions and concerns were addressed.

## 2023-01-02 NOTE — PHYSICAL EXAM
[de-identified] : The patient is sitting comfortably in the exam room. \par Left elbow\par -Skin is intact, no swelling, no ecchymosis\par -Pronation 90, supination 90\par -Range of motion elbow \par -Elbow is stable to varus valgus stress\par -Nontender to palpation over the distal radius and ulnar styloid\par -Able to make a fist, no swelling of the fingers\par -Minimal tenderness to palpation over the radial head\par -Sensation is intact median, radial, ulnar, axillary nerves\par -Motor is intact median, radial, ulnar, axillary nerves\par -Hand is warm and well-perfused, Palpable radial and ulnar pulses [de-identified] : X-rays of the left elbow were taken in the office today.

## 2023-01-02 NOTE — HISTORY OF PRESENT ILLNESS
[de-identified] : Ms. MATHEUS TIERNEY is a 67 year woman presents today for follow-up of minimally displaced left radial head fracture that occurred while rolling skating on 2/28/22. She states she is having not much pain but complains of not having full extension of her elbow.

## 2023-01-03 LAB
ALBUMIN SERPL ELPH-MCNC: 4.7 G/DL
ALP BLD-CCNC: 91 U/L
ALT SERPL-CCNC: 12 U/L
ANA SER IF-ACNC: NEGATIVE
ANION GAP SERPL CALC-SCNC: 14 MMOL/L
AST SERPL-CCNC: 17 U/L
BASOPHILS # BLD AUTO: 0.03 K/UL
BASOPHILS NFR BLD AUTO: 0.5 %
BILIRUB SERPL-MCNC: 0.7 MG/DL
BUN SERPL-MCNC: 14 MG/DL
CALCIUM SERPL-MCNC: 10.2 MG/DL
CCP AB SER IA-ACNC: >250 UNITS
CHLORIDE SERPL-SCNC: 100 MMOL/L
CO2 SERPL-SCNC: 25 MMOL/L
COVID-19 NUCLEOCAPSID  GAM ANTIBODY INTERPRETATION: POSITIVE
CREAT SERPL-MCNC: 0.95 MG/DL
CRP SERPL-MCNC: 10 MG/L
EGFR: 66 ML/MIN/1.73M2
EOSINOPHIL # BLD AUTO: 0.07 K/UL
EOSINOPHIL NFR BLD AUTO: 1.1 %
ERYTHROCYTE [SEDIMENTATION RATE] IN BLOOD BY WESTERGREN METHOD: 45 MM/HR
ESTIMATED AVERAGE GLUCOSE: 103 MG/DL
GLUCOSE SERPL-MCNC: 99 MG/DL
HBA1C MFR BLD HPLC: 5.2 %
HCT VFR BLD CALC: 41.4 %
HGB BLD-MCNC: 13.3 G/DL
IMM GRANULOCYTES NFR BLD AUTO: 0.3 %
LYMPHOCYTES # BLD AUTO: 1.42 K/UL
LYMPHOCYTES NFR BLD AUTO: 22.4 %
MAN DIFF?: NORMAL
MCHC RBC-ENTMCNC: 30.3 PG
MCHC RBC-ENTMCNC: 32.1 GM/DL
MCV RBC AUTO: 94.3 FL
MONOCYTES # BLD AUTO: 0.37 K/UL
MONOCYTES NFR BLD AUTO: 5.8 %
NEUTROPHILS # BLD AUTO: 4.43 K/UL
NEUTROPHILS NFR BLD AUTO: 69.9 %
PLATELET # BLD AUTO: 319 K/UL
POTASSIUM SERPL-SCNC: 4.1 MMOL/L
PROT SERPL-MCNC: 8.4 G/DL
RBC # BLD: 4.39 M/UL
RBC # FLD: 15.2 %
RF+CCP IGG SER-IMP: ABNORMAL
RHEUMATOID FACT SER QL: 485 IU/ML
SARS-COV-2 AB SERPL QL IA: 98.2 INDEX
SODIUM SERPL-SCNC: 139 MMOL/L
T3 SERPL-MCNC: 91 NG/DL
T3FREE SERPL-MCNC: 2.72 PG/ML
T4 FREE SERPL-MCNC: 1.1 NG/DL
T4 SERPL-MCNC: 6.5 UG/DL
TSH SERPL-ACNC: 1.87 UIU/ML
URATE SERPL-MCNC: 5.5 MG/DL
WBC # FLD AUTO: 6.34 K/UL

## 2023-01-29 ENCOUNTER — NON-APPOINTMENT (OUTPATIENT)
Age: 68
End: 2023-01-29

## 2023-01-30 ENCOUNTER — APPOINTMENT (OUTPATIENT)
Dept: INTERNAL MEDICINE | Facility: CLINIC | Age: 68
End: 2023-01-30
Payer: MEDICARE

## 2023-01-30 ENCOUNTER — NON-APPOINTMENT (OUTPATIENT)
Age: 68
End: 2023-01-30

## 2023-01-30 ENCOUNTER — RESULT REVIEW (OUTPATIENT)
Age: 68
End: 2023-01-30

## 2023-01-30 VITALS
HEIGHT: 68 IN | BODY MASS INDEX: 21.98 KG/M2 | WEIGHT: 145 LBS | DIASTOLIC BLOOD PRESSURE: 80 MMHG | TEMPERATURE: 97.8 F | SYSTOLIC BLOOD PRESSURE: 120 MMHG

## 2023-01-30 DIAGNOSIS — R06.00 DYSPNEA, UNSPECIFIED: ICD-10-CM

## 2023-01-30 DIAGNOSIS — F32.A DEPRESSION, UNSPECIFIED: ICD-10-CM

## 2023-01-30 DIAGNOSIS — R91.1 SOLITARY PULMONARY NODULE: ICD-10-CM

## 2023-01-30 DIAGNOSIS — J18.1 LOBAR PNEUMONIA, UNSPECIFIED ORGANISM: ICD-10-CM

## 2023-01-30 DIAGNOSIS — E04.9 NONTOXIC GOITER, UNSPECIFIED: ICD-10-CM

## 2023-01-30 PROCEDURE — 99214 OFFICE O/P EST MOD 30 MIN: CPT | Mod: 25

## 2023-01-30 PROCEDURE — 93000 ELECTROCARDIOGRAM COMPLETE: CPT

## 2023-01-30 PROCEDURE — G0439: CPT

## 2023-01-30 RX ORDER — GUAIFENESIN 600 MG/1
600 TABLET, EXTENDED RELEASE ORAL
Refills: 0 | Status: COMPLETED | COMMUNITY
Start: 2022-09-19 | End: 2023-01-30

## 2023-01-30 RX ORDER — PREDNISONE 10 MG/1
10 TABLET ORAL
Qty: 60 | Refills: 0 | Status: COMPLETED | COMMUNITY
Start: 2022-12-23 | End: 2023-01-30

## 2023-02-02 ENCOUNTER — RESULT REVIEW (OUTPATIENT)
Age: 68
End: 2023-02-02

## 2023-02-02 ENCOUNTER — LABORATORY RESULT (OUTPATIENT)
Age: 68
End: 2023-02-02

## 2023-02-02 ENCOUNTER — APPOINTMENT (OUTPATIENT)
Dept: RHEUMATOLOGY | Facility: CLINIC | Age: 68
End: 2023-02-02
Payer: MEDICARE

## 2023-02-02 DIAGNOSIS — Z13.820 ENCOUNTER FOR SCREENING FOR OSTEOPOROSIS: ICD-10-CM

## 2023-02-02 PROCEDURE — 99205 OFFICE O/P NEW HI 60 MIN: CPT

## 2023-02-03 ENCOUNTER — APPOINTMENT (OUTPATIENT)
Dept: RADIOLOGY | Facility: IMAGING CENTER | Age: 68
End: 2023-02-03
Payer: MEDICARE

## 2023-02-03 ENCOUNTER — OUTPATIENT (OUTPATIENT)
Dept: OUTPATIENT SERVICES | Facility: HOSPITAL | Age: 68
LOS: 1 days | End: 2023-02-03
Payer: MEDICARE

## 2023-02-03 DIAGNOSIS — M06.9 RHEUMATOID ARTHRITIS, UNSPECIFIED: ICD-10-CM

## 2023-02-03 PROCEDURE — 73562 X-RAY EXAM OF KNEE 3: CPT

## 2023-02-03 PROCEDURE — 73562 X-RAY EXAM OF KNEE 3: CPT | Mod: 26,50

## 2023-02-03 PROCEDURE — 73620 X-RAY EXAM OF FOOT: CPT | Mod: 26,50

## 2023-02-03 PROCEDURE — 73130 X-RAY EXAM OF HAND: CPT | Mod: 26,50

## 2023-02-03 PROCEDURE — 72052 X-RAY EXAM NECK SPINE 6/>VWS: CPT | Mod: 26

## 2023-02-03 PROCEDURE — 73620 X-RAY EXAM OF FOOT: CPT

## 2023-02-03 PROCEDURE — 72052 X-RAY EXAM NECK SPINE 6/>VWS: CPT

## 2023-02-03 PROCEDURE — 73130 X-RAY EXAM OF HAND: CPT

## 2023-02-14 ENCOUNTER — NON-APPOINTMENT (OUTPATIENT)
Age: 68
End: 2023-02-14

## 2023-02-14 DIAGNOSIS — M53.2X1 SPINAL INSTABILITIES, OCCIPITO-ATLANTO-AXIAL REGION: ICD-10-CM

## 2023-02-14 LAB
CCP AB SER IA-ACNC: >250 UNITS
CRP SERPL-MCNC: 11 MG/L
ERYTHROCYTE [SEDIMENTATION RATE] IN BLOOD BY WESTERGREN METHOD: 24 MM/HR
G6PD SER-CCNC: 10.1 U/G HGB
RF+CCP IGG SER-IMP: ABNORMAL
RHEUMATOID FACT SER QL: 485 IU/ML

## 2023-02-20 NOTE — HISTORY OF PRESENT ILLNESS
[FreeTextEntry8] : pt presents c/o 1-2 months of diffuse arthralgias of knees and of foot/toes that come and go.\par She also notes increased GERD/dyspepsia and has concerns about taking NSAIDS\par

## 2023-02-23 ENCOUNTER — NON-APPOINTMENT (OUTPATIENT)
Age: 68
End: 2023-02-23

## 2023-02-23 PROBLEM — Z13.820 OSTEOPOROSIS SCREENING: Status: ACTIVE | Noted: 2023-01-30

## 2023-02-23 NOTE — PHYSICAL EXAM
[General Appearance - Alert] : alert [General Appearance - In No Acute Distress] : in no acute distress [General Appearance - Well Nourished] : well nourished [Musculoskeletal - Swelling] : no joint swelling seen [FreeTextEntry1] : no RA changes [] : no rash [Sensation] : the sensory exam was normal to light touch and pinprick [Motor Exam] : the motor exam was normal [Oriented To Time, Place, And Person] : oriented to person, place, and time

## 2023-02-23 NOTE — HISTORY OF PRESENT ILLNESS
[FreeTextEntry1] : Referred by PCP Dr. Leonard for RA management.\par \par # RA\par dg with RA in 1990s based on toe and hand pain and swelling, morning stiffness\par follows with rheumatologist, on meds for 2 years, was on Hydroxychloroquine, other meds does not recall\par was a smoker at the time\par went for 1 acupuncture session and her RA symptoms resolved\par \par 2/28/2022 went rollerskating and fell. Left elbow hairline fracture, went to PT till 8/2022 which helped\par 8/2022 had pneumonia\par 10-11/2022 recurrent left elbow swelling and warmth, stared again 11/20232 with OT\par Since 2022 with neck and low george kpain\par 12/2022 left knee pain and swelling, then few days feet pain and swelling\par 12/2022 , CCP > 250\par Rx diclofenac which helped a lot, does not take it every day\par Was rx prednisone but has not wanted to take ti due to fear of side effects, sister and motehr were on it in the past\par \par # Osteoporosis\par DEXA T score -2.7 3/2021\par not on treatments\par \par \par # UC\par dg [id number]\par has been on mesalamine but does not take it regularly\par in remission\par \par # Breast ca\par breast ca 2000\par recurrence in 2019, was on anastrazole till 8/2022\par \par Occupation: retired\par FH: mother UC, sister asthma \par

## 2023-02-23 NOTE — CONSULT LETTER
[Dear  ___] : Dear  [unfilled], [Consult Letter:] : I had the pleasure of evaluating your patient, [unfilled]. [Please see my note below.] : Please see my note below. [Consult Closing:] : Thank you very much for allowing me to participate in the care of this patient.  If you have any questions, please do not hesitate to contact me. [Sincerely,] : Sincerely, [FreeTextEntry2] : Dr. Melissa Leonard [FreeTextEntry3] : Dr. Minerva Abraham\par

## 2023-02-23 NOTE — ASSESSMENT
[FreeTextEntry1] : 68F with seropositive (RF+ CCP+) RA since 1990s coming to establish care for RA. Off of treatments for many years, now with intermittent polyarthritis suggestive of RA flare.\par Currently controlled with diclofenac\par \par Plan:\par # RA\par -check inflammatory arthritis labs\par -check xrays of hands, feet, knee, c-spine xrays to assess for atlantoaxial instability\par -recommend HCQ, patient is amenable to try it\par \par # Osteoporosis\par -DEXA T score -2.7 \par -Spoke today about treatment for osteoporosis to prevent further bone loss and lower risk of fractures\par Risks/benefits of bisphosphonate therapy explained to patient, printed information provided to read\par -Start alendronate weekly. Reviewed instructions: Take on an empty stomach, 30min before meal with a large glass water and sit upright for 30-60 minutes afterwards. Patient was advised that the use of bisphosphonates has been reported to cause in very rare instances osteonecrosis of the jaw (1:10993, advised to do all invasive dental work prior and maintain a good oral hygiene), atypical femoral fractures (with >5 y use, 1:2000).\par -daily calcium intake 1000-1200mg\par -Vit D supplementation based on serum VitD\par \par

## 2023-02-27 ENCOUNTER — NON-APPOINTMENT (OUTPATIENT)
Age: 68
End: 2023-02-27

## 2023-03-09 ENCOUNTER — APPOINTMENT (OUTPATIENT)
Dept: MRI IMAGING | Facility: IMAGING CENTER | Age: 68
End: 2023-03-09
Payer: MEDICARE

## 2023-03-09 ENCOUNTER — OUTPATIENT (OUTPATIENT)
Dept: OUTPATIENT SERVICES | Facility: HOSPITAL | Age: 68
LOS: 1 days | End: 2023-03-09
Payer: MEDICARE

## 2023-03-09 ENCOUNTER — OUTPATIENT (OUTPATIENT)
Dept: OUTPATIENT SERVICES | Facility: HOSPITAL | Age: 68
LOS: 1 days | End: 2023-03-09

## 2023-03-09 ENCOUNTER — APPOINTMENT (OUTPATIENT)
Dept: ULTRASOUND IMAGING | Facility: CLINIC | Age: 68
End: 2023-03-09
Payer: MEDICARE

## 2023-03-09 DIAGNOSIS — Z98.890 OTHER SPECIFIED POSTPROCEDURAL STATES: Chronic | ICD-10-CM

## 2023-03-09 DIAGNOSIS — Z86.39 PERSONAL HISTORY OF OTHER ENDOCRINE, NUTRITIONAL AND METABOLIC DISEASE: ICD-10-CM

## 2023-03-09 DIAGNOSIS — E04.0 NONTOXIC DIFFUSE GOITER: ICD-10-CM

## 2023-03-09 DIAGNOSIS — Z00.8 ENCOUNTER FOR OTHER GENERAL EXAMINATION: ICD-10-CM

## 2023-03-09 PROCEDURE — 72141 MRI NECK SPINE W/O DYE: CPT | Mod: 26,MH

## 2023-03-09 PROCEDURE — 76536 US EXAM OF HEAD AND NECK: CPT

## 2023-03-09 PROCEDURE — 72141 MRI NECK SPINE W/O DYE: CPT

## 2023-03-09 PROCEDURE — 76536 US EXAM OF HEAD AND NECK: CPT | Mod: 26

## 2023-03-11 ENCOUNTER — NON-APPOINTMENT (OUTPATIENT)
Age: 68
End: 2023-03-11

## 2023-03-13 ENCOUNTER — APPOINTMENT (OUTPATIENT)
Dept: ORTHOPEDIC SURGERY | Facility: CLINIC | Age: 68
End: 2023-03-13
Payer: MEDICARE

## 2023-03-13 VITALS — BODY MASS INDEX: 21.98 KG/M2 | WEIGHT: 145 LBS | HEIGHT: 68 IN

## 2023-03-13 DIAGNOSIS — M47.812 SPONDYLOSIS W/OUT MYELOPATHY OR RADICULOPATHY, CERVICAL REGION: ICD-10-CM

## 2023-03-13 DIAGNOSIS — M43.10 SPONDYLOLISTHESIS, SITE UNSPECIFIED: ICD-10-CM

## 2023-03-13 PROCEDURE — 99214 OFFICE O/P EST MOD 30 MIN: CPT

## 2023-03-13 NOTE — HISTORY OF PRESENT ILLNESS
[de-identified] : Ms. MATHEUS TIERNEY  is a 68 year old female who presents to the office for a cervical spine evaluation and review of her cervical studies.  She complains of chronic intermittent neck pain.  Denies any UE radicular symptoms.  Normal bowel and bladder control.   Denies any recent fevers, chills, sweats, weight loss, or infection.\par \par The patients past medical history, past surgical history, medications, allergies, and social history were reviewed by me today with the patient and documented accordingly.  In addition, the patient's family history, which is noncontributory to their visit, was also reviewed.\par

## 2023-03-13 NOTE — DISCUSSION/SUMMARY
[de-identified] : We reviewed her imaging.  We discussed further treatment options.  She has done some physical therapy in the past which she has found beneficial.  At this point she is unsure whether she wants to proceed with this given the fact that she does not believe it is currently covered due to therapy for other regions of her body.  She will look into this and let me know if she would like to proceed with therapy.  She will also let me know if any changes or worsening of her symptoms.

## 2023-03-13 NOTE — PHYSICAL EXAM
[Antalgic] : not antalgic [Ataxic] : not ataxic [de-identified] : Examination of the cervical spine reveals no midline or paraspinal tenderness to palpation. No cervical lymphadenopathy. Decreased range of motion with respect to flexion, extension, rotation, and lateral bending. Negative Spurlings. Negative Lhermitte's. Full range of motion bilateral shoulders without evidence of impingement. No instability of bilateral upper extremities.  Cranial nerves II through XII grossly intact. Intact sensation bilateral upper extremities. 5/5 deltoids biceps triceps wrist extensors wrist flexors finger flexors and hand intrinsics. 1+ biceps triceps and brachioradialis reflexes. Negative Montoya's. 2+ radial pulse. Negative Tinel's over the cubital and carpal tunnel. No skin lesions on the right and left upper extremities. [de-identified] : Review of her cervical spine MRI reveals multilevel spondylosis.  There is some areas of foraminal stenosis but not high-grade.  Radiology report is not yet available.\par \par Review of her previous AP flexion-extension views of the cervical spine does reveal some mobile spondylolisthesis

## 2023-03-16 ENCOUNTER — APPOINTMENT (OUTPATIENT)
Dept: SURGERY | Facility: CLINIC | Age: 68
End: 2023-03-16
Payer: MEDICARE

## 2023-03-16 ENCOUNTER — APPOINTMENT (OUTPATIENT)
Dept: SURGERY | Facility: CLINIC | Age: 68
End: 2023-03-16

## 2023-03-16 DIAGNOSIS — Z86.39 PERSONAL HISTORY OF OTHER ENDOCRINE, NUTRITIONAL AND METABOLIC DISEASE: ICD-10-CM

## 2023-03-16 PROCEDURE — 99213 OFFICE O/P EST LOW 20 MIN: CPT

## 2023-03-16 NOTE — PHYSICAL EXAM
[de-identified] : no cervical or supraclavicular adenopathy, trachea midline, thyroid with bilateral firm enlargement without discrete mass [Normal] : no neck adenopathy [de-identified] : Skin:  normal appearance.  no rash, nodules, vesicles, or erythema,\par Musculoskeletal:  full range of motion and no deformities appreciated\par Neurological:  grossly intact\par Psychiatric:  oriented to person, place and time with appropriate affect

## 2023-03-16 NOTE — HISTORY OF PRESENT ILLNESS
[de-identified] : Patient referred by Dr. Leonard for evaluation of enlarging thyroid goiter.  Patient was diagnosed with Graves' disease in the 1980s treated with PTU for 2 years with resolution.  Patient has been observed for a gradually enlarging thyroid goiter.  Thyroid ultrasound December 2020: Right lobe 7.3 x 3.6 x 3.4 cm, left lobe 7.4 x 3.9 x 3.5 cm, no discrete nodules or enlarged lymph nodes.  Blood work January 2022: TSH 2.9, free T4 1, total T3 75.  Patient denies dysphagia, shortness of breath or change in voice.  Patient has been treated with radiation for breast cancer twice.  Follow-up ultrasound April 2022 with slight decrease in thyroid lobes, no discrete nodules.  Repeat ultrasound March 2023 again slight decrease in thyroid lobes.  No discrete nodules.  Patient diagnosed with rheumatoid arthritis currently being treated.  I have reviewed all old and new data and available images.

## 2023-03-16 NOTE — ASSESSMENT
[FreeTextEntry1] : Patient with prior history of Graves' and longstanding goiter.  Patient denies current symptoms.  If goiter enlarges or symptoms develop, possible surgical excision would be indicated.  I have reviewed the pathophysiology of the disease process, the area anatomy and the rationale for surgery.  I discussed the risks, benefits and alternative treatments which include but are not limited to bleeding, infection, numbness, hoarseness, hypocalcemia, scarring, and need for reoperation.  Patient will continue with observation at this time.

## 2023-03-17 ENCOUNTER — NON-APPOINTMENT (OUTPATIENT)
Age: 68
End: 2023-03-17

## 2023-03-21 ENCOUNTER — NON-APPOINTMENT (OUTPATIENT)
Age: 68
End: 2023-03-21

## 2023-03-23 ENCOUNTER — APPOINTMENT (OUTPATIENT)
Dept: RHEUMATOLOGY | Facility: CLINIC | Age: 68
End: 2023-03-23
Payer: MEDICARE

## 2023-03-23 VITALS
RESPIRATION RATE: 16 BRPM | HEART RATE: 93 BPM | SYSTOLIC BLOOD PRESSURE: 167 MMHG | TEMPERATURE: 97.1 F | WEIGHT: 140 LBS | DIASTOLIC BLOOD PRESSURE: 107 MMHG | HEIGHT: 68 IN | OXYGEN SATURATION: 98 % | BODY MASS INDEX: 21.22 KG/M2

## 2023-03-23 VITALS — DIASTOLIC BLOOD PRESSURE: 90 MMHG | SYSTOLIC BLOOD PRESSURE: 145 MMHG

## 2023-03-23 PROCEDURE — 99214 OFFICE O/P EST MOD 30 MIN: CPT

## 2023-03-23 NOTE — REVIEW OF SYSTEMS
[Recent Change In Weight] : ~T recent weight change [Joint Pain] : joint pain [Negative] : Heme/Lymph [Diarrhea] : diarrhea [FreeTextEntry2] : weight loss of 16 lbs since last  year [FreeTextEntry9] : decreased arthralgias since taking diclofenac

## 2023-03-23 NOTE — PHYSICAL EXAM
[No Acute Distress] : no acute distress [Well Nourished] : well nourished [Well Developed] : well developed [Well-Appearing] : well-appearing [Normal Sclera/Conjunctiva] : normal sclera/conjunctiva [PERRL] : pupils equal round and reactive to light [EOMI] : extraocular movements intact [Normal Outer Ear/Nose] : the outer ears and nose were normal in appearance [No JVD] : no jugular venous distention [Supple] : supple [No Lymphadenopathy] : no lymphadenopathy [No Respiratory Distress] : no respiratory distress  [Clear to Auscultation] : lungs were clear to auscultation bilaterally [No Accessory Muscle Use] : no accessory muscle use [Normal Rate] : normal rate  [Regular Rhythm] : with a regular rhythm [Normal S1, S2] : normal S1 and S2 [No Murmur] : no murmur heard [No Carotid Bruits] : no carotid bruits [No Abdominal Bruit] : a ~M bruit was not heard ~T in the abdomen [No Varicosities] : no varicosities [Pedal Pulses Present] : the pedal pulses are present [No Edema] : there was no peripheral edema [No Extremity Clubbing/Cyanosis] : no extremity clubbing/cyanosis [No Palpable Aorta] : no palpable aorta [Soft] : abdomen soft [Non Tender] : non-tender [Non-distended] : non-distended [No Masses] : no abdominal mass palpated [No HSM] : no HSM [Normal Bowel Sounds] : normal bowel sounds [Normal Posterior Cervical Nodes] : no posterior cervical lymphadenopathy [Normal Anterior Cervical Nodes] : no anterior cervical lymphadenopathy [No CVA Tenderness] : no CVA  tenderness [No Spinal Tenderness] : no spinal tenderness [No Joint Swelling] : no joint swelling [Grossly Normal Strength/Tone] : grossly normal strength/tone [No Rash] : no rash [Normal Gait] : normal gait [Coordination Grossly Intact] : coordination grossly intact [No Focal Deficits] : no focal deficits [Deep Tendon Reflexes (DTR)] : deep tendon reflexes were 2+ and symmetric [Normal Affect] : the affect was normal [Normal Insight/Judgement] : insight and judgment were intact [de-identified] : large goiter with multiple nodules bilaterally [de-identified] : s/p old left lumpectomy scar (from 19 years ago at 7 O'clock position) and new lumpectomy scar along left outer breast- with localized post radiation changes and induration/ s./p left axillary LN dissection scar-c/d/i

## 2023-03-23 NOTE — HISTORY OF PRESENT ILLNESS
[FreeTextEntry1] : Pt. presents for a comprehensive evaluation for multiple medical issues.\par \par  [de-identified] : She had a very difficult past  1-2years.  Her older sister who was 72 yo and lived in [place]  suddenly in 2020 without any warning.  She may have  from a severe asthma attack. Patient is mourning her loss.\par \par She got sick and admitted to hospital  with large LLL consolidated PNA and pericardial effusion -was on IV antibiotics for ~ 1 week and is overall doing better\par \par Pt was diagnosed with invasive ductal carcinoma of the left breast- underwent  left lumpectomy 2019 with Dr. Vivek Mayfield at [facility] and had intra-operative localized XRT. 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 She had f/u with Dr. Mayfield in ~2019.\par \par She is doing well since the surgery and is now on Anastrazole 1mg qd.\par She had f/u with oncologist, Dr. Mavis Wharton in [facility] last month (she sees her q ~6months) .\par \par Last year, her BP was noted to be labile with multiple high readings. Stress level has gone down and she lost weight and reduced salt intake and BP has been in normal range.  She decided not to take the amlodipine and wants to continue to monitor the BP. She denies any chest pressure or palpitations of headaches. Echo revealed mild/ grade1  LVH and moderate MR.\par \par \par

## 2023-03-27 NOTE — ASSESSMENT
[FreeTextEntry1] : 68F with seropositive (RF+ CCP+) RA since 1990s, Ulcerative Colitis - possible RA flare versus enteropathic arthritis\par Off of treatments for many years, now with intermittent polyarthritis suggestive of RA flare.\par \par Plan:\par # RA\par -cw HCQ 200mg bid, needs ophtho follow up and reassess response in 2months\par -short course of prednisone\par -discussed mtx. ACR patient info provided. Will consider it if hcq does not help, in discussion with oncologist\par \par # Neck pain\par -MRI mild degenerative changes\par -no atlantoaxial instability\par -spine specialist referral\par \par # Osteoporosis\par -DEXA T score -2.7  3/2021 - repeat \par -Spoke today about treatment for osteoporosis to prevent further bone loss and lower risk of fractures\par Risks/benefits of bisphosphonate therapy explained to patient, printed information provided to read\par -Start alendronate weekly. Reviewed instructions: Take on an empty stomach, 30min before meal with a large glass water and sit upright for 30-60 minutes afterwards. Patient was advised that the use of bisphosphonates has been reported to cause in very rare instances osteonecrosis of the jaw (1:08629, advised to do all invasive dental work prior and maintain a good oral hygiene), atypical femoral fractures (with >5 y use, 1:2000).\par -daily calcium intake 8==775kr\par -Vit D supplementation based on serum VitD\par \par rtc in 2months\par \par

## 2023-03-27 NOTE — PHYSICAL EXAM
[General Appearance - Alert] : alert [General Appearance - In No Acute Distress] : in no acute distress [General Appearance - Well Nourished] : well nourished [Musculoskeletal - Swelling] : no joint swelling seen [] : no rash [Sensation] : the sensory exam was normal to light touch and pinprick [Motor Exam] : the motor exam was normal [Oriented To Time, Place, And Person] : oriented to person, place, and time [FreeTextEntry1] : no RA changes

## 2023-03-27 NOTE — HISTORY OF PRESENT ILLNESS
[FreeTextEntry1] : Interval hx\par 3/23/2023\par pt has been on hcq for about 3-4 weeks. feel no change in her joint pain. pt has been taking advil, concerned about prednisone side effects.

## 2023-04-06 ENCOUNTER — APPOINTMENT (OUTPATIENT)
Dept: RHEUMATOLOGY | Facility: CLINIC | Age: 68
End: 2023-04-06

## 2023-04-11 ENCOUNTER — NON-APPOINTMENT (OUTPATIENT)
Age: 68
End: 2023-04-11

## 2023-04-12 ENCOUNTER — APPOINTMENT (OUTPATIENT)
Dept: INTERNAL MEDICINE | Facility: CLINIC | Age: 68
End: 2023-04-12
Payer: MEDICARE

## 2023-04-12 VITALS
HEART RATE: 93 BPM | DIASTOLIC BLOOD PRESSURE: 92 MMHG | BODY MASS INDEX: 21.22 KG/M2 | WEIGHT: 140 LBS | OXYGEN SATURATION: 99 % | SYSTOLIC BLOOD PRESSURE: 158 MMHG | HEIGHT: 68 IN

## 2023-04-12 DIAGNOSIS — M54.50 LOW BACK PAIN, UNSPECIFIED: ICD-10-CM

## 2023-04-12 DIAGNOSIS — R53.83 OTHER FATIGUE: ICD-10-CM

## 2023-04-12 PROCEDURE — 99214 OFFICE O/P EST MOD 30 MIN: CPT

## 2023-04-12 NOTE — HISTORY OF PRESENT ILLNESS
[___ Days ago] : [unfilled] days ago [Constant] : constant [Severe] : severe [Heat] : heat [Activity] : with activity [At Night] : at night [Stable] : stable [de-identified] : lower back pain 10/10

## 2023-04-12 NOTE — PHYSICAL EXAM
[Normal] : normal rate, regular rhythm, normal S1 and S2 and no murmur heard [No CVA Tenderness] : no CVA  tenderness [de-identified] : lower back tenderness

## 2023-04-12 NOTE — ASSESSMENT
[FreeTextEntry1] : # lower back pain\par \par # RA\par dg with RA in 1990s based on toe and hand pain and swelling, morning stiffness\par follows with rheumatologist, on meds for 2 years, \par ON Hydroxychloroquine, AND PREDNISONE\par went for 1 acupuncture session (2000) and her RA symptoms resolved\par \par # ulcerative colitis \par Mesalamine 1.2 GM Oral Tablet Delayed Release; TAKE 2 TABLETS BY MOUTH EVERY DAY\par f/u [facility]\par \par \par \par

## 2023-04-13 ENCOUNTER — APPOINTMENT (OUTPATIENT)
Dept: ORTHOPEDIC SURGERY | Facility: CLINIC | Age: 68
End: 2023-04-13
Payer: MEDICARE

## 2023-04-13 VITALS — HEIGHT: 68 IN | BODY MASS INDEX: 20.92 KG/M2 | WEIGHT: 138 LBS

## 2023-04-13 DIAGNOSIS — M51.36 OTHER INTERVERTEBRAL DISC DEGENERATION, LUMBAR REGION: ICD-10-CM

## 2023-04-13 DIAGNOSIS — S39.012A STRAIN OF MUSCLE, FASCIA AND TENDON OF LOWER BACK, INITIAL ENCOUNTER: ICD-10-CM

## 2023-04-13 PROCEDURE — 99214 OFFICE O/P EST MOD 30 MIN: CPT

## 2023-04-13 PROCEDURE — 72100 X-RAY EXAM L-S SPINE 2/3 VWS: CPT

## 2023-04-13 RX ORDER — DICLOFENAC SODIUM 75 MG/1
75 TABLET, DELAYED RELEASE ORAL
Qty: 60 | Refills: 0 | Status: COMPLETED | COMMUNITY
Start: 2023-02-28 | End: 2023-04-13

## 2023-04-17 ENCOUNTER — RESULT REVIEW (OUTPATIENT)
Age: 68
End: 2023-04-17

## 2023-04-17 ENCOUNTER — APPOINTMENT (OUTPATIENT)
Dept: RADIOLOGY | Facility: IMAGING CENTER | Age: 68
End: 2023-04-17
Payer: MEDICARE

## 2023-04-17 ENCOUNTER — OUTPATIENT (OUTPATIENT)
Dept: OUTPATIENT SERVICES | Facility: HOSPITAL | Age: 68
LOS: 1 days | End: 2023-04-17
Payer: MEDICARE

## 2023-04-17 ENCOUNTER — APPOINTMENT (OUTPATIENT)
Dept: MAMMOGRAPHY | Facility: IMAGING CENTER | Age: 68
End: 2023-04-17
Payer: MEDICARE

## 2023-04-17 ENCOUNTER — APPOINTMENT (OUTPATIENT)
Dept: ULTRASOUND IMAGING | Facility: IMAGING CENTER | Age: 68
End: 2023-04-17
Payer: MEDICARE

## 2023-04-17 DIAGNOSIS — Z00.8 ENCOUNTER FOR OTHER GENERAL EXAMINATION: ICD-10-CM

## 2023-04-17 DIAGNOSIS — Z98.890 OTHER SPECIFIED POSTPROCEDURAL STATES: Chronic | ICD-10-CM

## 2023-04-17 PROCEDURE — 77080 DXA BONE DENSITY AXIAL: CPT | Mod: 26

## 2023-04-17 PROCEDURE — G0279: CPT | Mod: 26

## 2023-04-17 PROCEDURE — 77066 DX MAMMO INCL CAD BI: CPT | Mod: 26

## 2023-04-17 PROCEDURE — 76641 ULTRASOUND BREAST COMPLETE: CPT | Mod: 26,50,GA

## 2023-04-17 PROCEDURE — G0279: CPT

## 2023-04-17 PROCEDURE — 77066 DX MAMMO INCL CAD BI: CPT

## 2023-04-17 PROCEDURE — 77080 DXA BONE DENSITY AXIAL: CPT

## 2023-04-17 PROCEDURE — 76641 ULTRASOUND BREAST COMPLETE: CPT

## 2023-04-18 ENCOUNTER — RX RENEWAL (OUTPATIENT)
Age: 68
End: 2023-04-18

## 2023-04-20 ENCOUNTER — NON-APPOINTMENT (OUTPATIENT)
Age: 68
End: 2023-04-20

## 2023-04-21 ENCOUNTER — NON-APPOINTMENT (OUTPATIENT)
Age: 68
End: 2023-04-21

## 2023-04-26 ENCOUNTER — APPOINTMENT (OUTPATIENT)
Dept: INTERNAL MEDICINE | Facility: CLINIC | Age: 68
End: 2023-04-26
Payer: MEDICARE

## 2023-04-26 DIAGNOSIS — J20.9 ACUTE BRONCHITIS, UNSPECIFIED: ICD-10-CM

## 2023-04-26 DIAGNOSIS — F41.9 ANXIETY DISORDER, UNSPECIFIED: ICD-10-CM

## 2023-04-26 PROCEDURE — 99214 OFFICE O/P EST MOD 30 MIN: CPT | Mod: 95

## 2023-04-26 NOTE — HISTORY OF PRESENT ILLNESS
[Home] : at home, [unfilled] , at the time of the visit. [Medical Office: ([facility])___] : at the medical office located in  [Verbal consent obtained from patient] : the patient, [unfilled] [Moderate] : moderate [___ Days ago] : [unfilled] days ago [Constant] : constant [Congestion] : congestion [Cough] : cough [Sore Throat] : sore throat [Anorexia] : anorexia [Fatigue] : fatigue [Headache] : headache [OTC Remedies] : OTC remedies [Worsening] : worsening [Wheezing] : no wheezing [Chills] : no chills [Shortness Of Breath] : no shortness of breath [Earache] : no earache [Fever] : no fever

## 2023-04-26 NOTE — ASSESSMENT
[FreeTextEntry1] : BRONCHITIS  \par Acute URI\par ORDERED a requisition for a COVID PCR test to be done today.\par I started patient empirically on prednisone taper for worsening cough and congestion. \par F/U 1 WK IF NOT BETTER\par If patient develops SOB, patient will call me\par All questions and concerns were discussed. \par Z PACK AND predniSONE RX SENT\par predniSONE DIRECTIONS DISCUSSED AND EXPLAINED WITH PATIENT\par

## 2023-04-28 ENCOUNTER — NON-APPOINTMENT (OUTPATIENT)
Age: 68
End: 2023-04-28

## 2023-05-11 ENCOUNTER — APPOINTMENT (OUTPATIENT)
Dept: RHEUMATOLOGY | Facility: CLINIC | Age: 68
End: 2023-05-11
Payer: MEDICARE

## 2023-05-11 VITALS
RESPIRATION RATE: 16 BRPM | BODY MASS INDEX: 20.46 KG/M2 | WEIGHT: 135 LBS | SYSTOLIC BLOOD PRESSURE: 134 MMHG | HEIGHT: 68 IN | HEART RATE: 94 BPM | OXYGEN SATURATION: 98 % | TEMPERATURE: 97.1 F | DIASTOLIC BLOOD PRESSURE: 87 MMHG

## 2023-05-11 PROCEDURE — 99214 OFFICE O/P EST MOD 30 MIN: CPT

## 2023-05-17 NOTE — PHYSICAL EXAM
[General Appearance - Alert] : alert [General Appearance - In No Acute Distress] : in no acute distress [General Appearance - Well Nourished] : well nourished [Musculoskeletal - Swelling] : no joint swelling seen [] : no rash [Sensation] : the sensory exam was normal to light touch and pinprick [Motor Exam] : the motor exam was normal [Oriented To Time, Place, And Person] : oriented to person, place, and time [FreeTextEntry1] : no RA changes. TTP of right 1st distal toe, no swelling

## 2023-05-17 NOTE — ASSESSMENT
[FreeTextEntry1] : 68F with seropositive (RF+ CCP+) RA since 1990s, Ulcerative Colitis - possible RA flare versus enteropathic arthritis\par Off of treatments for many years, now with intermittent polyarthritis suggestive of RA flare.\par \par Plan:\par # RA\par -cw HCQ 200mg bid, needs ophtho follow up \par -short course of NSAIDS \par -discussed mtx. ACR patient info provided. Will consider it if hcq does not help, in discussion with oncologist\par \par # UC\par -on mesalamine\par \par # Neck pain\par -MRI mild degenerative changes\par -no atlantoaxial instability\par -spine specialist referral\par \par # Osteoporosis\par -DEXA T score -2.7  4/2023\par -Spoke today about treatment for osteoporosis to prevent further bone loss and lower risk of fractures\par Risks/benefits of bisphosphonate therapy explained to patient, printed information provided to read\par -Start alendronate weekly. Reviewed instructions: Take on an empty stomach, 30min before meal with a large glass water and sit upright for 30-60 minutes afterwards. Patient was advised that the use of bisphosphonates has been reported to cause in very rare instances osteonecrosis of the jaw (1:58145, advised to do all invasive dental work prior and maintain a good oral hygiene), atypical femoral fractures (with >5 y use, 1:2000).\par -daily calcium intake 3==039gb\par -Vit D supplementation based on serum VitD\par \par rtc in 2months\par \par

## 2023-05-17 NOTE — HISTORY OF PRESENT ILLNESS
[FreeTextEntry1] : Interval hx\par 5/11/2023\par Pt had respiratory infection in April 2023. Pt finished prednisone taper and has been on HCQ. She has been having some joint pain in right big toe, left elbow.\par \par 3/23/2023\par pt has been on hcq for about 3-4 weeks. feel no change in her joint pain. pt has been taking advil, concerned about prednisone side effects.

## 2023-06-08 ENCOUNTER — NON-APPOINTMENT (OUTPATIENT)
Age: 68
End: 2023-06-08

## 2023-07-03 ENCOUNTER — APPOINTMENT (OUTPATIENT)
Dept: CT IMAGING | Facility: IMAGING CENTER | Age: 68
End: 2023-07-03
Payer: MEDICARE

## 2023-07-03 ENCOUNTER — OUTPATIENT (OUTPATIENT)
Dept: OUTPATIENT SERVICES | Facility: HOSPITAL | Age: 68
LOS: 1 days | End: 2023-07-03
Payer: MEDICARE

## 2023-07-03 DIAGNOSIS — Z00.8 ENCOUNTER FOR OTHER GENERAL EXAMINATION: ICD-10-CM

## 2023-07-03 DIAGNOSIS — Z98.890 OTHER SPECIFIED POSTPROCEDURAL STATES: Chronic | ICD-10-CM

## 2023-07-03 PROCEDURE — 71250 CT THORAX DX C-: CPT | Mod: MH

## 2023-07-03 PROCEDURE — 71250 CT THORAX DX C-: CPT | Mod: 26,MH

## 2023-07-06 ENCOUNTER — APPOINTMENT (OUTPATIENT)
Dept: RHEUMATOLOGY | Facility: CLINIC | Age: 68
End: 2023-07-06
Payer: MEDICARE

## 2023-07-06 VITALS
BODY MASS INDEX: 20.46 KG/M2 | TEMPERATURE: 97.1 F | HEIGHT: 68 IN | DIASTOLIC BLOOD PRESSURE: 99 MMHG | RESPIRATION RATE: 16 BRPM | SYSTOLIC BLOOD PRESSURE: 167 MMHG | HEART RATE: 93 BPM | WEIGHT: 135 LBS | OXYGEN SATURATION: 98 %

## 2023-07-06 DIAGNOSIS — M81.0 AGE-RELATED OSTEOPOROSIS W/OUT CURRENT PATHOLOGICAL FRACTURE: ICD-10-CM

## 2023-07-06 PROCEDURE — 99214 OFFICE O/P EST MOD 30 MIN: CPT

## 2023-07-11 ENCOUNTER — RX RENEWAL (OUTPATIENT)
Age: 68
End: 2023-07-11

## 2023-07-13 PROBLEM — M81.0 OSTEOPOROSIS, POST-MENOPAUSAL: Status: ACTIVE | Noted: 2018-01-18

## 2023-07-13 NOTE — ASSESSMENT
[FreeTextEntry1] : 68F with seropositive (RF+ CCP+) RA since 1990s, Ulcerative Colitis - possible RA flare versus enteropathic arthritis\par Off of treatments for many years, now with intermittent polyarthritis suggestive of RA flare.\par \par Plan:\par # RA\par -cw HCQ 200mg bid since 2/2023, needs ophtho follow up urgently.\par -short course of NSAIDS \par -discussed mtx. ACR patient info provided. Will consider it if hcq does not help, in discussion with oncologist\par -would like to try acupuncture, turmeric as opposed to DMARDS...explained to her that she would develop chroniic joint damage and deformities if she goes untreated.. pt verbalized understanding\par \par # UC\par -on mesalamine\par \par # Neck pain\par -MRI mild degenerative changes\par -no atlantoaxial instability\par -spine specialist referral\par \par # Osteoporosis\par -DEXA T score -2.7  4/2023\par -Spoke today about treatment for osteoporosis to prevent further bone loss and lower risk of fractures\par Risks/benefits of bisphosphonate therapy explained to patient, printed information provided to read\par -Start alendronate weekly. Reviewed instructions: Take on an empty stomach, 30min before meal with a large glass water and sit upright for 30-60 minutes afterwards. Patient was advised that the use of bisphosphonates has been reported to cause in very rare instances osteonecrosis of the jaw (1:79616, advised to do all invasive dental work prior and maintain a good oral hygiene), atypical femoral fractures (with >5 y use, 1:2000).\par -daily calcium intake 7==408vc\par -Vit D supplementation based on serum VitD\par \par rtc in 2months\par \par

## 2023-07-13 NOTE — PHYSICAL EXAM
[General Appearance - Alert] : alert [General Appearance - In No Acute Distress] : in no acute distress [General Appearance - Well Nourished] : well nourished [Musculoskeletal - Swelling] : no joint swelling seen [] : no rash [Sensation] : the sensory exam was normal to light touch and pinprick [Motor Exam] : the motor exam was normal [Oriented To Time, Place, And Person] : oriented to person, place, and time [FreeTextEntry1] : right 2nd and 3rd PIP swelling and limited ROM

## 2023-07-13 NOTE — HISTORY OF PRESENT ILLNESS
[FreeTextEntry1] : Interval hx\par 7/6/2023\par Pt with ongoing intermittent joint pain and swelling, mostly hands. Pt did not take prednisone as rx due to fear of side effects. Advil helped her pain. not interested in DMARDs.\par \par 5/11/2023\par Pt had respiratory infection in April 2023. Pt finished prednisone taper and has been on HCQ. She has been having some joint pain in right big toe, left elbow.\par \par 3/23/2023\par pt has been on hcq for about 3-4 weeks. feel no change in her joint pain. pt has been taking advil, concerned about prednisone side effects.

## 2023-07-14 ENCOUNTER — NON-APPOINTMENT (OUTPATIENT)
Age: 68
End: 2023-07-14

## 2023-07-17 RX ORDER — METHYLPREDNISOLONE 4 MG/1
4 TABLET ORAL
Qty: 1 | Refills: 0 | Status: COMPLETED | COMMUNITY
Start: 2023-04-26 | End: 2023-07-17

## 2023-07-17 RX ORDER — AZITHROMYCIN 250 MG/1
250 TABLET, FILM COATED ORAL
Qty: 1 | Refills: 0 | Status: COMPLETED | COMMUNITY
Start: 2023-04-26 | End: 2023-07-17

## 2023-07-27 ENCOUNTER — OUTPATIENT (OUTPATIENT)
Dept: OUTPATIENT SERVICES | Facility: HOSPITAL | Age: 68
LOS: 1 days | End: 2023-07-27
Payer: MEDICARE

## 2023-07-27 ENCOUNTER — APPOINTMENT (OUTPATIENT)
Dept: CV DIAGNOSITCS | Facility: HOSPITAL | Age: 68
End: 2023-07-27

## 2023-07-27 DIAGNOSIS — Z98.890 OTHER SPECIFIED POSTPROCEDURAL STATES: Chronic | ICD-10-CM

## 2023-07-27 DIAGNOSIS — I31.39 OTHER PERICARDIAL EFFUSION (NONINFLAMMATORY): ICD-10-CM

## 2023-07-27 PROCEDURE — 93306 TTE W/DOPPLER COMPLETE: CPT | Mod: 26

## 2023-07-31 ENCOUNTER — APPOINTMENT (OUTPATIENT)
Dept: CARDIOLOGY | Facility: CLINIC | Age: 68
End: 2023-07-31
Payer: MEDICARE

## 2023-07-31 VITALS
SYSTOLIC BLOOD PRESSURE: 146 MMHG | OXYGEN SATURATION: 99 % | BODY MASS INDEX: 20.83 KG/M2 | WEIGHT: 137 LBS | HEART RATE: 74 BPM | DIASTOLIC BLOOD PRESSURE: 98 MMHG

## 2023-07-31 DIAGNOSIS — I31.39 OTHER PERICARDIAL EFFUSION (NONINFLAMMATORY): ICD-10-CM

## 2023-07-31 PROCEDURE — 93000 ELECTROCARDIOGRAM COMPLETE: CPT

## 2023-07-31 PROCEDURE — 99203 OFFICE O/P NEW LOW 30 MIN: CPT

## 2023-08-02 ENCOUNTER — RX RENEWAL (OUTPATIENT)
Age: 68
End: 2023-08-02

## 2023-08-17 ENCOUNTER — APPOINTMENT (OUTPATIENT)
Dept: CARDIOLOGY | Facility: CLINIC | Age: 68
End: 2023-08-17

## 2023-08-20 NOTE — DISCUSSION/SUMMARY
[EKG obtained to assist in diagnosis and management of assessed problem(s)] : EKG obtained to assist in diagnosis and management of assessed problem(s) [FreeTextEntry1] : In summary, is a 68-year-old female Ms Sandy with a small chronic pericardial effusion.  She is asymptomatic.  Her exam shows a regular rhythm at a rate of about 60, no JVD, clear lungs, and a normal cardiac exam without rub.  A current EKG remains within normal limits.  The effusion is small and chronic and is most likely due to her longstanding rheumatoid arthritis.  This is a stable situation and I do not think she requires any further testing at present.  I plan to see her on an annual basis and repeat her echocardiogram next year.  No change was made in her regimen of hydroxychloroquine.

## 2023-08-20 NOTE — HISTORY OF PRESENT ILLNESS
[FreeTextEntry1] : 68-year-old female being seen in cardiac evaluation after she was noted to have a pericardial effusion on chest CT scan.  She has no prior history of heart disease, but does have a 20 year history of rheumatoid arthritis.  The initial CT scan was taken about a year ago to follow some pulmonary nodules and it showed a "small pericardial effusion".  The effusion was again noted on a CT scan done last month.  An echo was done on 7/27.  It also showed a small pericardial effusion without any evidence of tamponade or any other significant abnormalities.  She is asymptomatic without chest pain, dyspnea, or change in her exercise capacity.  Her rheumatoid arthritis has been more active during the past 6 months and she is on hydroxychloroquine with some improvement at present.

## 2023-08-21 ENCOUNTER — APPOINTMENT (OUTPATIENT)
Dept: INTERNAL MEDICINE | Facility: CLINIC | Age: 68
End: 2023-08-21
Payer: MEDICARE

## 2023-08-21 VITALS
BODY MASS INDEX: 20.61 KG/M2 | DIASTOLIC BLOOD PRESSURE: 99 MMHG | HEIGHT: 68 IN | WEIGHT: 136 LBS | SYSTOLIC BLOOD PRESSURE: 156 MMHG | OXYGEN SATURATION: 99 % | HEART RATE: 76 BPM

## 2023-08-21 DIAGNOSIS — D64.9 ANEMIA, UNSPECIFIED: ICD-10-CM

## 2023-08-21 DIAGNOSIS — Z11.1 ENCOUNTER FOR SCREENING FOR RESPIRATORY TUBERCULOSIS: ICD-10-CM

## 2023-08-21 PROCEDURE — 36415 COLL VENOUS BLD VENIPUNCTURE: CPT

## 2023-08-21 PROCEDURE — 99214 OFFICE O/P EST MOD 30 MIN: CPT | Mod: 25

## 2023-08-21 RX ORDER — PANTOPRAZOLE 40 MG/1
40 TABLET, DELAYED RELEASE ORAL
Qty: 30 | Refills: 0 | Status: COMPLETED | COMMUNITY
Start: 2022-12-22 | End: 2023-08-21

## 2023-08-23 LAB
25(OH)D3 SERPL-MCNC: 19.1 NG/ML
ALBUMIN SERPL ELPH-MCNC: 4.8 G/DL
ALP BLD-CCNC: 90 U/L
ALT SERPL-CCNC: 11 U/L
ANA SER IF-ACNC: NEGATIVE
ANION GAP SERPL CALC-SCNC: 13 MMOL/L
AST SERPL-CCNC: 23 U/L
BILIRUB SERPL-MCNC: 0.9 MG/DL
BUN SERPL-MCNC: 18 MG/DL
CALCIUM SERPL-MCNC: 9.7 MG/DL
CCP AB SER IA-ACNC: >250 UNITS
CHLORIDE SERPL-SCNC: 100 MMOL/L
CO2 SERPL-SCNC: 25 MMOL/L
CREAT SERPL-MCNC: 0.86 MG/DL
CRP SERPL-MCNC: 7 MG/L
EGFR: 74 ML/MIN/1.73M2
ERYTHROCYTE [SEDIMENTATION RATE] IN BLOOD BY WESTERGREN METHOD: 72 MM/HR
ESTIMATED AVERAGE GLUCOSE: 103 MG/DL
FERRITIN SERPL-MCNC: 122 NG/ML
GLUCOSE SERPL-MCNC: 96 MG/DL
HBA1C MFR BLD HPLC: 5.2 %
M TB IFN-G BLD-IMP: NEGATIVE
POTASSIUM SERPL-SCNC: 3.9 MMOL/L
PROT SERPL-MCNC: 9.3 G/DL
QUANTIFERON TB PLUS MITOGEN MINUS NIL: 2.14 IU/ML
QUANTIFERON TB PLUS NIL: 0.01 IU/ML
QUANTIFERON TB PLUS TB1 MINUS NIL: 0 IU/ML
QUANTIFERON TB PLUS TB2 MINUS NIL: 0 IU/ML
RF+CCP IGG SER-IMP: ABNORMAL
RHEUMATOID FACT SER QL: 352 IU/ML
SODIUM SERPL-SCNC: 138 MMOL/L
T3FREE SERPL-MCNC: 2.54 PG/ML
T4 FREE SERPL-MCNC: 0.9 NG/DL
TSH SERPL-ACNC: 4.17 UIU/ML
URATE SERPL-MCNC: 7 MG/DL

## 2023-10-05 ENCOUNTER — APPOINTMENT (OUTPATIENT)
Dept: RHEUMATOLOGY | Facility: CLINIC | Age: 68
End: 2023-10-05

## 2023-10-12 ENCOUNTER — APPOINTMENT (OUTPATIENT)
Dept: RHEUMATOLOGY | Facility: CLINIC | Age: 68
End: 2023-10-12

## 2023-10-26 ENCOUNTER — APPOINTMENT (OUTPATIENT)
Dept: CT IMAGING | Facility: IMAGING CENTER | Age: 68
End: 2023-10-26
Payer: MEDICARE

## 2023-10-26 ENCOUNTER — OUTPATIENT (OUTPATIENT)
Dept: OUTPATIENT SERVICES | Facility: HOSPITAL | Age: 68
LOS: 1 days | End: 2023-10-26
Payer: MEDICARE

## 2023-10-26 DIAGNOSIS — Z98.890 OTHER SPECIFIED POSTPROCEDURAL STATES: Chronic | ICD-10-CM

## 2023-10-26 DIAGNOSIS — Z00.8 ENCOUNTER FOR OTHER GENERAL EXAMINATION: ICD-10-CM

## 2023-10-26 PROCEDURE — 71250 CT THORAX DX C-: CPT | Mod: MH

## 2023-10-26 PROCEDURE — 71250 CT THORAX DX C-: CPT | Mod: 26,MH

## 2023-11-21 ENCOUNTER — APPOINTMENT (OUTPATIENT)
Dept: NUCLEAR MEDICINE | Facility: IMAGING CENTER | Age: 68
End: 2023-11-21
Payer: MEDICARE

## 2023-11-21 ENCOUNTER — OUTPATIENT (OUTPATIENT)
Dept: OUTPATIENT SERVICES | Facility: HOSPITAL | Age: 68
LOS: 1 days | End: 2023-11-21
Payer: MEDICARE

## 2023-11-21 DIAGNOSIS — F17.200 NICOTINE DEPENDENCE, UNSPECIFIED, UNCOMPLICATED: ICD-10-CM

## 2023-11-21 DIAGNOSIS — R93.89 ABNORMAL FINDINGS ON DIAGNOSTIC IMAGING OF OTHER SPECIFIED BODY STRUCTURES: ICD-10-CM

## 2023-11-21 DIAGNOSIS — J43.2 CENTRILOBULAR EMPHYSEMA: ICD-10-CM

## 2023-11-21 PROCEDURE — 78815 PET IMAGE W/CT SKULL-THIGH: CPT | Mod: 26,PI,MH

## 2023-11-21 PROCEDURE — A9552: CPT

## 2023-11-21 PROCEDURE — 78815 PET IMAGE W/CT SKULL-THIGH: CPT | Mod: MH

## 2023-11-27 RX ORDER — HYDROXYCHLOROQUINE SULFATE 200 MG/1
200 TABLET, FILM COATED ORAL
Qty: 60 | Refills: 3 | Status: ACTIVE | COMMUNITY
Start: 2023-02-14 | End: 1900-01-01

## 2023-11-30 DIAGNOSIS — R59.0 LOCALIZED ENLARGED LYMPH NODES: ICD-10-CM

## 2023-12-07 ENCOUNTER — RESULT REVIEW (OUTPATIENT)
Age: 68
End: 2023-12-07

## 2023-12-07 ENCOUNTER — OUTPATIENT (OUTPATIENT)
Dept: OUTPATIENT SERVICES | Facility: HOSPITAL | Age: 68
LOS: 1 days | End: 2023-12-07
Payer: MEDICARE

## 2023-12-07 ENCOUNTER — APPOINTMENT (OUTPATIENT)
Dept: ULTRASOUND IMAGING | Facility: IMAGING CENTER | Age: 68
End: 2023-12-07
Payer: MEDICARE

## 2023-12-07 DIAGNOSIS — Z98.890 OTHER SPECIFIED POSTPROCEDURAL STATES: Chronic | ICD-10-CM

## 2023-12-07 DIAGNOSIS — R59.0 LOCALIZED ENLARGED LYMPH NODES: ICD-10-CM

## 2023-12-07 PROCEDURE — 88305 TISSUE EXAM BY PATHOLOGIST: CPT | Mod: 26

## 2023-12-07 PROCEDURE — A4648: CPT

## 2023-12-07 PROCEDURE — 38505 NEEDLE BIOPSY LYMPH NODES: CPT | Mod: LT

## 2023-12-07 PROCEDURE — 76942 ECHO GUIDE FOR BIOPSY: CPT

## 2023-12-07 PROCEDURE — 76942 ECHO GUIDE FOR BIOPSY: CPT | Mod: 26

## 2023-12-07 PROCEDURE — 88305 TISSUE EXAM BY PATHOLOGIST: CPT

## 2023-12-07 PROCEDURE — 38505 NEEDLE BIOPSY LYMPH NODES: CPT

## 2023-12-11 LAB
SURGICAL PATHOLOGY STUDY: SIGNIFICANT CHANGE UP
SURGICAL PATHOLOGY STUDY: SIGNIFICANT CHANGE UP

## 2023-12-31 PROBLEM — Z23 NEED FOR VACCINATION WITH 13-POLYVALENT PNEUMOCOCCAL CONJUGATE VACCINE: Status: ACTIVE | Noted: 2020-12-17

## 2023-12-31 PROBLEM — Z23 NEED FOR 23-POLYVALENT PNEUMOCOCCAL POLYSACCHARIDE VACCINE: Status: ACTIVE | Noted: 2022-01-27

## 2024-02-01 ENCOUNTER — NON-APPOINTMENT (OUTPATIENT)
Age: 69
End: 2024-02-01

## 2024-02-01 ENCOUNTER — APPOINTMENT (OUTPATIENT)
Dept: INTERNAL MEDICINE | Facility: CLINIC | Age: 69
End: 2024-02-01
Payer: MEDICARE

## 2024-02-01 VITALS
DIASTOLIC BLOOD PRESSURE: 94 MMHG | WEIGHT: 133 LBS | HEART RATE: 77 BPM | SYSTOLIC BLOOD PRESSURE: 153 MMHG | OXYGEN SATURATION: 99 % | TEMPERATURE: 97.3 F | HEIGHT: 68 IN | BODY MASS INDEX: 20.16 KG/M2

## 2024-02-01 DIAGNOSIS — E04.0 NONTOXIC DIFFUSE GOITER: ICD-10-CM

## 2024-02-01 DIAGNOSIS — K51.90 ULCERATIVE COLITIS, UNSPECIFIED, W/OUT COMPLICATIONS: ICD-10-CM

## 2024-02-01 DIAGNOSIS — M81.0 AGE-RELATED OSTEOPOROSIS W/OUT CURRENT PATHOLOGICAL FRACTURE: ICD-10-CM

## 2024-02-01 DIAGNOSIS — I10 ESSENTIAL (PRIMARY) HYPERTENSION: ICD-10-CM

## 2024-02-01 DIAGNOSIS — M06.9 RHEUMATOID ARTHRITIS, UNSPECIFIED: ICD-10-CM

## 2024-02-01 DIAGNOSIS — Z72.0 TOBACCO USE: ICD-10-CM

## 2024-02-01 DIAGNOSIS — R73.09 OTHER ABNORMAL GLUCOSE: ICD-10-CM

## 2024-02-01 DIAGNOSIS — N39.0 URINARY TRACT INFECTION, SITE NOT SPECIFIED: ICD-10-CM

## 2024-02-01 PROCEDURE — 99214 OFFICE O/P EST MOD 30 MIN: CPT | Mod: 25

## 2024-02-01 PROCEDURE — G0439: CPT

## 2024-02-01 PROCEDURE — 36415 COLL VENOUS BLD VENIPUNCTURE: CPT

## 2024-02-01 PROCEDURE — 93000 ELECTROCARDIOGRAM COMPLETE: CPT

## 2024-02-01 RX ORDER — ALENDRONATE SODIUM 70 MG/1
70 TABLET ORAL
Qty: 5 | Refills: 3 | Status: ACTIVE | COMMUNITY
Start: 2023-05-11 | End: 1900-01-01

## 2024-02-01 RX ORDER — PREDNISONE 5 MG/1
5 TABLET ORAL
Qty: 60 | Refills: 0 | Status: COMPLETED | COMMUNITY
Start: 2023-03-23 | End: 2024-02-01

## 2024-02-01 RX ORDER — PREDNISONE 5 MG/1
5 TABLET ORAL
Qty: 60 | Refills: 0 | Status: COMPLETED | COMMUNITY
Start: 2023-06-08 | End: 2024-02-01

## 2024-02-01 RX ORDER — OXYCODONE AND ACETAMINOPHEN 2.5; 325 MG/1; MG/1
2.5-325 TABLET ORAL EVERY 6 HOURS
Qty: 14 | Refills: 0 | Status: COMPLETED | COMMUNITY
Start: 2023-04-12 | End: 2024-02-01

## 2024-02-01 RX ORDER — TIZANIDINE 4 MG/1
4 TABLET ORAL
Qty: 30 | Refills: 1 | Status: COMPLETED | COMMUNITY
Start: 2023-04-13 | End: 2024-02-01

## 2024-02-01 NOTE — HEALTH RISK ASSESSMENT
[Good] : ~his/her~  mood as  good [Yes] : Yes [4 or more  times a week (4 pts)] : 4 or more  times a week (4 points) [1 or 2 (0 pts)] : 1 or 2 (0 points) [Never (0 pts)] : Never (0 points) [No] : In the past 12 months have you used drugs other than those required for medical reasons? No [Any fall with injury in past year] : Patient reported fall with injury in the past year [0] : 2) Feeling down, depressed, or hopeless: Not at all (0) [Patient reported mammogram was abnormal] : Patient reported mammogram was abnormal [Patient reported bone density results were abnormal] : Patient reported bone density results were abnormal [Patient reported colonoscopy was abnormal] : Patient reported colonoscopy was abnormal [HIV test declined] : HIV test declined [Hepatitis C test declined] : Hepatitis C test declined [None] : None [With Significant Other] : lives with significant other [# of Members in Household ___] :  household currently consist of [unfilled] member(s) [Retired] : retired [College] : College [] :  [# Of Children ___] : has [unfilled] children [Sexually Active] : sexually active [Feels Safe at Home] : Feels safe at home [Fully functional (bathing, dressing, toileting, transferring, walking, feeding)] : Fully functional (bathing, dressing, toileting, transferring, walking, feeding) [Fully functional (using the telephone, shopping, preparing meals, housekeeping, doing laundry, using] : Fully functional and needs no help or supervision to perform IADLs (using the telephone, shopping, preparing meals, housekeeping, doing laundry, using transportation, managing medications and managing finances) [Reports normal functional visual acuity (ie: able to read med bottle)] : Reports normal functional visual acuity [Smoke Detector] : smoke detector [Seat Belt] :  uses seat belt [Sunscreen] : uses sunscreen [With Patient/Caregiver] : , with patient/caregiver [Current] : Current [0-4] : 0-4 [> 15 Years] : > 15 Years [de-identified] : none [de-identified] : orthopedic surgeon Dr. Neno Villalpando for left hairline fracture, Dr. Gwyn Murphy for neck pains, Dr. Quintero-.pulmonologist, and Dr. Jennifer Monge-for thyroid issues [de-identified] : [id number] cocktails every night [Audit-CScore] : 4 [de-identified] : walks 4 times a week [de-identified] : overall healthy balanced and varied diet without any exclusions  [de-identified] : fell rollerskating 02/22-fractured left elbow [IEB0Zonms] : 0 [Change in mental status noted] : No change in mental status noted [Language] : denies difficulty with language [Behavior] : denies difficulty with behavior [Learning/Retaining New Information] : denies difficulty learning/retaining new information [Handling Complex Tasks] : denies difficulty handling complex tasks [Reasoning] : denies difficulty with reasoning [Spatial Ability and Orientation] : denies difficulty with spatial ability and orientation [High Risk Behavior] : no high risk behavior [Reports changes in hearing] : Reports no changes in hearing [Reports changes in vision] : Reports no changes in vision [Reports changes in dental health] : Reports no changes in dental health [Guns at Home] : no guns at home [Travel to Developing Areas] : does not  travel to developing areas [TB Exposure] : is not being exposed to tuberculosis [Caregiver Concerns] : does not have caregiver concerns [MammogramDate] : 04/23 [MammogramComments] : was diagnosed with breast cancer-will f/u with Dr. Mayfield 9/2019 [PapSmearDate] : 03/22 [PapSmearComments] : with Dr. Clark [BoneDensityDate] : 04/23 [BoneDensityComments] : T scores were -2.7 and -2.0 [ColonoscopyDate] : 11/20 [ColonoscopyComments] : with MOH-confirmed Ulcerative Colitis-will need repeat colonoscopy-will schedule later [AdvancecareDate] : 01/24 [FreeTextEntry4] : Patient does not have a designated health care proxy.  Patient will discuss with family.  Information and health care proxy forms were given to the pt.\par

## 2024-02-01 NOTE — PHYSICAL EXAM
[No Acute Distress] : no acute distress [Well Nourished] : well nourished [Well Developed] : well developed [Well-Appearing] : well-appearing [Normal Sclera/Conjunctiva] : normal sclera/conjunctiva [PERRL] : pupils equal round and reactive to light [EOMI] : extraocular movements intact [Normal Outer Ear/Nose] : the outer ears and nose were normal in appearance [No JVD] : no jugular venous distention [Supple] : supple [No Lymphadenopathy] : no lymphadenopathy [No Respiratory Distress] : no respiratory distress  [Clear to Auscultation] : lungs were clear to auscultation bilaterally [No Accessory Muscle Use] : no accessory muscle use [Normal Rate] : normal rate  [Regular Rhythm] : with a regular rhythm [Normal S1, S2] : normal S1 and S2 [No Murmur] : no murmur heard [No Carotid Bruits] : no carotid bruits [No Abdominal Bruit] : a ~M bruit was not heard ~T in the abdomen [No Varicosities] : no varicosities [Pedal Pulses Present] : the pedal pulses are present [No Edema] : there was no peripheral edema [No Extremity Clubbing/Cyanosis] : no extremity clubbing/cyanosis [No Palpable Aorta] : no palpable aorta [Soft] : abdomen soft [Non Tender] : non-tender [Non-distended] : non-distended [No Masses] : no abdominal mass palpated [No HSM] : no HSM [Normal Bowel Sounds] : normal bowel sounds [Normal Posterior Cervical Nodes] : no posterior cervical lymphadenopathy [Normal Anterior Cervical Nodes] : no anterior cervical lymphadenopathy [No CVA Tenderness] : no CVA  tenderness [No Spinal Tenderness] : no spinal tenderness [No Joint Swelling] : no joint swelling [Grossly Normal Strength/Tone] : grossly normal strength/tone [No Rash] : no rash [Normal Gait] : normal gait [Coordination Grossly Intact] : coordination grossly intact [No Focal Deficits] : no focal deficits [Deep Tendon Reflexes (DTR)] : deep tendon reflexes were 2+ and symmetric [Normal Affect] : the affect was normal [Normal Insight/Judgement] : insight and judgment were intact [de-identified] : large goiter with multiple nodules bilaterally [de-identified] : s/p old left lumpectomy scar (from 19 years ago at 7 O'clock position) and new lumpectomy scar along left outer breast- with localized post radiation changes and induration/ s./p left axillary LN dissection scar-c/d/i

## 2024-02-01 NOTE — HISTORY OF PRESENT ILLNESS
[FreeTextEntry1] : Pt. presents for a comprehensive evaluation for multiple medical issues.\par  \par   [de-identified] : Patient is now working full-time at [employer].  She is enjoying her job but does find it tiring.  She does continue to follow-up with Dr. Conrad White every 6 months for a right upper lobe lung nodule that is slowly growing.  For the time being they are observing this.  She does admit she continues to smoke approximately 4 to 5 cigarettes daily.  Patient had a routine chest CT scan with her pulmonologist in 8/2023 for follow-up of a right upper lobe nodule.  The nodule did get slightly bigger.  She had a follow-up PET scan that revealed evidence of an active lymph node along the left axilla.  She does have a history of is invasive ductal cancer of the left breast.  She did undergo a core biopsy of the lymph node but this was inconclusive.  She had follow-up with her breast surgeon Dr. Mayfield recently who feels it " is difficult to prove that it is negative or benign" and that patient may ultimately need surgery to remove the lymph node for definitive diagnosis.   Rheumatoid Arthritis--patient is followed by Dr. Lencho Simmons she is maintained on Plaquenil and recently started Humira 40 mg every other week.  She started this in 11/2023 and tolerating the treatment.  Her arthralgia symptoms are stable at this time.  She has not had a major flareup that required prednisone in the past year.  She does take Advil as needed pain but symptoms are manageable.   PMH: Pt was diagnosed with invasive ductal carcinoma of the left breast- underwent  left lumpectomy 2/21/2019 with Dr. Vivek Mayfield at [facility] and had intra-operative localized XRT. 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 She had f/u with Dr. Mayfield in ~9/2019.  She is doing well since the surgery and is now on Anastrazole 1mg qd. She had f/u with oncologist, Dr. Mavis Wharton in [place] [facility] beds [facility] the [facility] last month (she sees her q ~6months) .  Last year, her BP was noted to be labile with multiple high readings. Stress level has gone down and she lost weight and reduced salt intake and BP has been in normal range.  She decided not to take the amlodipine and wants to continue to monitor the BP. She denies any chest pressure or palpitations of headaches. Echo revealed mild/ grade1  LVH and moderate MR.

## 2024-02-02 DIAGNOSIS — E78.00 PURE HYPERCHOLESTEROLEMIA, UNSPECIFIED: ICD-10-CM

## 2024-02-02 LAB — T3 SERPL-MCNC: 79 NG/DL

## 2024-02-03 LAB
25(OH)D3 SERPL-MCNC: 31.2 NG/ML
ALBUMIN SERPL ELPH-MCNC: 4.3 G/DL
ALP BLD-CCNC: 79 U/L
ALT SERPL-CCNC: 8 U/L
ANION GAP SERPL CALC-SCNC: 16 MMOL/L
APPEARANCE: CLEAR
AST SERPL-CCNC: 16 U/L
BASOPHILS # BLD AUTO: 0.03 K/UL
BASOPHILS NFR BLD AUTO: 0.4 %
BILIRUB SERPL-MCNC: 0.7 MG/DL
BILIRUBIN URINE: NEGATIVE
BLOOD URINE: NEGATIVE
BUN SERPL-MCNC: 14 MG/DL
CALCIUM SERPL-MCNC: 9.1 MG/DL
CHLORIDE SERPL-SCNC: 101 MMOL/L
CHOLEST SERPL-MCNC: 153 MG/DL
CO2 SERPL-SCNC: 23 MMOL/L
COLOR: YELLOW
CREAT SERPL-MCNC: 0.86 MG/DL
CRP SERPL-MCNC: 33 MG/L
EGFR: 73 ML/MIN/1.73M2
EOSINOPHIL # BLD AUTO: 0.09 K/UL
EOSINOPHIL NFR BLD AUTO: 1.3 %
ERYTHROCYTE [SEDIMENTATION RATE] IN BLOOD BY WESTERGREN METHOD: 63 MM/HR
ESTIMATED AVERAGE GLUCOSE: 108 MG/DL
FOLATE SERPL-MCNC: 5.1 NG/ML
GLUCOSE QUALITATIVE U: NEGATIVE MG/DL
GLUCOSE SERPL-MCNC: 91 MG/DL
HBA1C MFR BLD HPLC: 5.4 %
HCT VFR BLD CALC: 37 %
HDLC SERPL-MCNC: 76 MG/DL
HGB BLD-MCNC: 12 G/DL
IMM GRANULOCYTES NFR BLD AUTO: 0.1 %
KETONES URINE: NEGATIVE MG/DL
LDLC SERPL CALC-MCNC: 68 MG/DL
LEUKOCYTE ESTERASE URINE: NEGATIVE
LYMPHOCYTES # BLD AUTO: 1.46 K/UL
LYMPHOCYTES NFR BLD AUTO: 21.4 %
MAN DIFF?: NORMAL
MCHC RBC-ENTMCNC: 31.1 PG
MCHC RBC-ENTMCNC: 32.4 GM/DL
MCV RBC AUTO: 95.9 FL
MONOCYTES # BLD AUTO: 0.53 K/UL
MONOCYTES NFR BLD AUTO: 7.8 %
NEUTROPHILS # BLD AUTO: 4.71 K/UL
NEUTROPHILS NFR BLD AUTO: 69 %
NITRITE URINE: NEGATIVE
NONHDLC SERPL-MCNC: 77 MG/DL
PH URINE: 5.5
PLATELET # BLD AUTO: 325 K/UL
POTASSIUM SERPL-SCNC: 3.8 MMOL/L
PROT SERPL-MCNC: 8 G/DL
PROTEIN URINE: NORMAL MG/DL
RBC # BLD: 3.86 M/UL
RBC # FLD: 14 %
RHEUMATOID FACT SER QL: 213 IU/ML
SODIUM SERPL-SCNC: 139 MMOL/L
SPECIFIC GRAVITY URINE: 1.02
T3FREE SERPL-MCNC: 2.58 PG/ML
T4 FREE SERPL-MCNC: 1.2 NG/DL
T4 SERPL-MCNC: 6.3 UG/DL
TRIGL SERPL-MCNC: 40 MG/DL
TSH SERPL-ACNC: 1 UIU/ML
UROBILINOGEN URINE: 1 MG/DL
VIT B12 SERPL-MCNC: 395 PG/ML
WBC # FLD AUTO: 6.83 K/UL

## 2024-05-16 RX ORDER — MESALAMINE 1.2 G/1
1.2 TABLET, DELAYED RELEASE ORAL
Qty: 120 | Refills: 3 | Status: ACTIVE | COMMUNITY
Start: 2021-08-09 | End: 1900-01-01

## 2024-08-06 ENCOUNTER — APPOINTMENT (OUTPATIENT)
Dept: CARDIOLOGY | Facility: CLINIC | Age: 69
End: 2024-08-06

## 2024-08-07 ENCOUNTER — APPOINTMENT (OUTPATIENT)
Dept: INTERNAL MEDICINE | Facility: CLINIC | Age: 69
End: 2024-08-07

## 2024-08-07 PROCEDURE — 36415 COLL VENOUS BLD VENIPUNCTURE: CPT

## 2024-08-07 PROCEDURE — 99214 OFFICE O/P EST MOD 30 MIN: CPT

## 2024-08-07 NOTE — HISTORY OF PRESENT ILLNESS
[FreeTextEntry1] : Patient presents for follow up evaluation for multiple medical concerns including:labile hypertension, h/o breast cancer, rheumatoid arthritis, ? new N vs nodule in her neck [de-identified] : Patient is now working full-time at [employer].  She is enjoying her job but does find it tiring. She works 5 days a week and is on her feet all day.  She does follow-up with Dr. Quintero every 6 months for a right upper lobe lung nodule that is slowly growing.  For the time being they are observing this.  She does admit she continues to smoke approximately 4 to 5 cigarettes daily.  Patient had a routine chest CT scan with her pulmonologist in 8/2023 for follow-up of a right upper lobe nodule.  The nodule did get slightly bigger.  She had a follow-up PET scan that revealed evidence of an active lymph node along the left axilla.  She does have a history of is invasive ductal cancer of the left breast.  She did undergo a core biopsy of the lymph node but this was inconclusive.  She had follow-up with her breast surgeon Dr. Mayfield  who feels it " is difficult to prove that it is negative or benign" and that patient may ultimately need surgery to remove the lymph node for definitive diagnosis.   Rheumatoid Arthritis--patient is followed by Dr. Lencho Simmons she wsa maintained on Plaquenil and then started Humira 40 mg every other week.  She started this in 11/2023 and tolerating the treatment.  Her arthralgia symptoms and blood work recently got worse and she started MTX -5 tablest a week in 4/2024 and stopped the Placquenil..  .   PMH: Pt was diagnosed with invasive ductal carcinoma of the left breast- underwent  left lumpectomy 2/21/2019 with Dr. Vivek Mayfield at [facility] and had intra-operative localized XRT. 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 She had f/u with Dr. Mayfield in ~9/2019.  She is doing well since the surgery and is now on Anastrazole 1mg qd. She had f/u with oncologist, with Dr. Hinton this month--Dr. Simmons noted a ? new LN in her neck -she will discuss this with him.  she sees Dr. Sarah Bartlett - endocrinologist for her goiter  Last year, her BP was noted to be labile with multiple high readings. Stress level has gone down and she lost weight and reduced salt intake and BP has been in normal range.  She decided not to take the amlodipine and wants to continue to monitor the BP. She denies any chest pressure or palpitations of headaches. Echo revealed mild/ grade1  LVH and moderate MR. She did o see her cardiologist Dr. Osorio this year -denies any symptoms of elevated BP but is willing to try medication for a few months.

## 2024-08-07 NOTE — PHYSICAL EXAM
[No Acute Distress] : no acute distress [Well Nourished] : well nourished [Well Developed] : well developed [Well-Appearing] : well-appearing [Normal Sclera/Conjunctiva] : normal sclera/conjunctiva [PERRL] : pupils equal round and reactive to light [EOMI] : extraocular movements intact [Normal Outer Ear/Nose] : the outer ears and nose were normal in appearance [Normal Oropharynx] : the oropharynx was normal [No JVD] : no jugular venous distention [No Lymphadenopathy] : no lymphadenopathy [Supple] : supple [Thyroid Normal, No Nodules] : the thyroid was normal and there were no nodules present [No Respiratory Distress] : no respiratory distress  [No Accessory Muscle Use] : no accessory muscle use [Clear to Auscultation] : lungs were clear to auscultation bilaterally [Normal Rate] : normal rate  [Regular Rhythm] : with a regular rhythm [Normal S1, S2] : normal S1 and S2 [No Carotid Bruits] : no carotid bruits [No Abdominal Bruit] : a ~M bruit was not heard ~T in the abdomen [No Varicosities] : no varicosities [Pedal Pulses Present] : the pedal pulses are present [No Edema] : there was no peripheral edema [No Palpable Aorta] : no palpable aorta [No Extremity Clubbing/Cyanosis] : no extremity clubbing/cyanosis [Soft] : abdomen soft [Non Tender] : non-tender [Non-distended] : non-distended [No Masses] : no abdominal mass palpated [No HSM] : no HSM [Normal Bowel Sounds] : normal bowel sounds [Normal Posterior Cervical Nodes] : no posterior cervical lymphadenopathy [Normal Anterior Cervical Nodes] : no anterior cervical lymphadenopathy [No CVA Tenderness] : no CVA  tenderness [No Spinal Tenderness] : no spinal tenderness [No Joint Swelling] : no joint swelling [Grossly Normal Strength/Tone] : grossly normal strength/tone [No Rash] : no rash [Coordination Grossly Intact] : coordination grossly intact [No Focal Deficits] : no focal deficits [Normal Gait] : normal gait [Deep Tendon Reflexes (DTR)] : deep tendon reflexes were 2+ and symmetric [Normal Affect] : the affect was normal [Normal Insight/Judgement] : insight and judgment were intact [de-identified] : palpable superficial 0.5cm nodule along right neck-? LN [de-identified] : 2/6 MARION

## 2024-08-07 NOTE — ASSESSMENT
[FreeTextEntry1] : Blood pressure was noted to be elevated on multiple readings on bilateral arms today.-Will start valsartan 40mg qd  I recommend checking and recording readings for the next several weeks.  I will contact the patient in 2 to 4 weeks to review the readings obtained at home.  If the readings remain elevated she will come in for a follow-up visit with me in 2 to 3 months.

## 2024-08-09 ENCOUNTER — NON-APPOINTMENT (OUTPATIENT)
Age: 69
End: 2024-08-09

## 2024-08-14 RX ORDER — METHOTREXATE 2.5 MG/1
2.5 TABLET ORAL
Refills: 0 | Status: ACTIVE | COMMUNITY

## 2024-08-20 ENCOUNTER — NON-APPOINTMENT (OUTPATIENT)
Age: 69
End: 2024-08-20

## 2024-12-09 ENCOUNTER — RESULT REVIEW (OUTPATIENT)
Age: 69
End: 2024-12-09

## 2024-12-09 ENCOUNTER — OUTPATIENT (OUTPATIENT)
Dept: OUTPATIENT SERVICES | Facility: HOSPITAL | Age: 69
LOS: 1 days | End: 2024-12-09
Payer: MEDICARE

## 2024-12-09 ENCOUNTER — APPOINTMENT (OUTPATIENT)
Dept: RADIOLOGY | Facility: CLINIC | Age: 69
End: 2024-12-09
Payer: MEDICARE

## 2024-12-09 ENCOUNTER — APPOINTMENT (OUTPATIENT)
Dept: INTERNAL MEDICINE | Facility: CLINIC | Age: 69
End: 2024-12-09
Payer: MEDICARE

## 2024-12-09 VITALS
HEIGHT: 68 IN | TEMPERATURE: 97 F | BODY MASS INDEX: 20 KG/M2 | OXYGEN SATURATION: 99 % | SYSTOLIC BLOOD PRESSURE: 147 MMHG | DIASTOLIC BLOOD PRESSURE: 86 MMHG | WEIGHT: 132 LBS | HEART RATE: 83 BPM

## 2024-12-09 DIAGNOSIS — E78.00 PURE HYPERCHOLESTEROLEMIA, UNSPECIFIED: ICD-10-CM

## 2024-12-09 DIAGNOSIS — I10 ESSENTIAL (PRIMARY) HYPERTENSION: ICD-10-CM

## 2024-12-09 DIAGNOSIS — R07.81 PLEURODYNIA: ICD-10-CM

## 2024-12-09 DIAGNOSIS — K51.90 ULCERATIVE COLITIS, UNSPECIFIED, W/OUT COMPLICATIONS: ICD-10-CM

## 2024-12-09 DIAGNOSIS — R73.09 OTHER ABNORMAL GLUCOSE: ICD-10-CM

## 2024-12-09 DIAGNOSIS — R59.0 LOCALIZED ENLARGED LYMPH NODES: ICD-10-CM

## 2024-12-09 DIAGNOSIS — M06.9 RHEUMATOID ARTHRITIS, UNSPECIFIED: ICD-10-CM

## 2024-12-09 PROCEDURE — 71101 X-RAY EXAM UNILAT RIBS/CHEST: CPT | Mod: 26,LT

## 2024-12-09 PROCEDURE — 71101 X-RAY EXAM UNILAT RIBS/CHEST: CPT

## 2024-12-09 PROCEDURE — 99214 OFFICE O/P EST MOD 30 MIN: CPT

## 2024-12-09 PROCEDURE — 36415 COLL VENOUS BLD VENIPUNCTURE: CPT

## 2024-12-09 PROCEDURE — G2211 COMPLEX E/M VISIT ADD ON: CPT

## 2024-12-10 ENCOUNTER — NON-APPOINTMENT (OUTPATIENT)
Age: 69
End: 2024-12-10

## 2024-12-10 LAB
25(OH)D3 SERPL-MCNC: 15.9 NG/ML
ALBUMIN SERPL ELPH-MCNC: 4.4 G/DL
ALP BLD-CCNC: 64 U/L
ALT SERPL-CCNC: 14 U/L
ANION GAP SERPL CALC-SCNC: 15 MMOL/L
AST SERPL-CCNC: 20 U/L
BASOPHILS # BLD AUTO: 0.02 K/UL
BASOPHILS NFR BLD AUTO: 0.4 %
BILIRUB SERPL-MCNC: 0.8 MG/DL
BUN SERPL-MCNC: 17 MG/DL
CALCIUM SERPL-MCNC: 10 MG/DL
CHLORIDE SERPL-SCNC: 102 MMOL/L
CHOLEST SERPL-MCNC: 219 MG/DL
CO2 SERPL-SCNC: 24 MMOL/L
CREAT SERPL-MCNC: 0.86 MG/DL
CRP SERPL-MCNC: <3 MG/L
EGFR: 73 ML/MIN/1.73M2
EOSINOPHIL # BLD AUTO: 0.06 K/UL
EOSINOPHIL NFR BLD AUTO: 1.2 %
GLUCOSE SERPL-MCNC: 82 MG/DL
HCT VFR BLD CALC: 36.5 %
HDLC SERPL-MCNC: 83 MG/DL
HGB BLD-MCNC: 11.4 G/DL
IMM GRANULOCYTES NFR BLD AUTO: 0.2 %
LDH SERPL-CCNC: 183 U/L
LDLC SERPL CALC-MCNC: 129 MG/DL
LYMPHOCYTES # BLD AUTO: 1.62 K/UL
LYMPHOCYTES NFR BLD AUTO: 33.4 %
MAN DIFF?: NORMAL
MCHC RBC-ENTMCNC: 31.2 G/DL
MCHC RBC-ENTMCNC: 31.5 PG
MCV RBC AUTO: 100.8 FL
MONOCYTES # BLD AUTO: 0.44 K/UL
MONOCYTES NFR BLD AUTO: 9.1 %
NEUTROPHILS # BLD AUTO: 2.7 K/UL
NEUTROPHILS NFR BLD AUTO: 55.7 %
NONHDLC SERPL-MCNC: 136 MG/DL
PLATELET # BLD AUTO: 296 K/UL
POTASSIUM SERPL-SCNC: 4.2 MMOL/L
PROT SERPL-MCNC: 8.3 G/DL
RBC # BLD: 3.62 M/UL
RBC # FLD: 14.7 %
RHEUMATOID FACT SER QL: 93 IU/ML
SODIUM SERPL-SCNC: 140 MMOL/L
T3FREE SERPL-MCNC: 2.62 PG/ML
T4 FREE SERPL-MCNC: 1.2 NG/DL
TRIGL SERPL-MCNC: 38 MG/DL
TSH SERPL-ACNC: 1.05 UIU/ML
WBC # FLD AUTO: 4.85 K/UL

## 2024-12-11 LAB
ERYTHROCYTE [SEDIMENTATION RATE] IN BLOOD BY WESTERGREN METHOD: 18 MM/HR
ESTIMATED AVERAGE GLUCOSE: 105 MG/DL
HBA1C MFR BLD HPLC: 5.3 %

## 2025-01-07 ENCOUNTER — RX RENEWAL (OUTPATIENT)
Age: 70
End: 2025-01-07

## 2025-01-20 ENCOUNTER — RESULT REVIEW (OUTPATIENT)
Age: 70
End: 2025-01-20

## 2025-01-20 ENCOUNTER — APPOINTMENT (OUTPATIENT)
Dept: MAMMOGRAPHY | Facility: IMAGING CENTER | Age: 70
End: 2025-01-20
Payer: MEDICARE

## 2025-01-20 ENCOUNTER — OUTPATIENT (OUTPATIENT)
Dept: OUTPATIENT SERVICES | Facility: HOSPITAL | Age: 70
LOS: 1 days | End: 2025-01-20
Payer: MEDICARE

## 2025-01-20 ENCOUNTER — APPOINTMENT (OUTPATIENT)
Dept: ULTRASOUND IMAGING | Facility: IMAGING CENTER | Age: 70
End: 2025-01-20
Payer: MEDICARE

## 2025-01-20 DIAGNOSIS — Z00.00 ENCOUNTER FOR GENERAL ADULT MEDICAL EXAMINATION WITHOUT ABNORMAL FINDINGS: ICD-10-CM

## 2025-01-20 DIAGNOSIS — Z98.890 OTHER SPECIFIED POSTPROCEDURAL STATES: Chronic | ICD-10-CM

## 2025-01-20 DIAGNOSIS — Z00.8 ENCOUNTER FOR OTHER GENERAL EXAMINATION: ICD-10-CM

## 2025-01-20 DIAGNOSIS — R92.30 DENSE BREASTS, UNSPECIFIED: ICD-10-CM

## 2025-01-20 PROCEDURE — 77067 SCR MAMMO BI INCL CAD: CPT

## 2025-01-20 PROCEDURE — 76641 ULTRASOUND BREAST COMPLETE: CPT | Mod: 26,50

## 2025-01-20 PROCEDURE — 77063 BREAST TOMOSYNTHESIS BI: CPT

## 2025-01-20 PROCEDURE — 77067 SCR MAMMO BI INCL CAD: CPT | Mod: 26

## 2025-01-20 PROCEDURE — 77063 BREAST TOMOSYNTHESIS BI: CPT | Mod: 26

## 2025-01-20 PROCEDURE — 76641 ULTRASOUND BREAST COMPLETE: CPT

## 2025-01-27 ENCOUNTER — APPOINTMENT (OUTPATIENT)
Dept: INTERNAL MEDICINE | Facility: CLINIC | Age: 70
End: 2025-01-27
Payer: MEDICARE

## 2025-01-27 ENCOUNTER — LABORATORY RESULT (OUTPATIENT)
Age: 70
End: 2025-01-27

## 2025-01-27 PROCEDURE — 36415 COLL VENOUS BLD VENIPUNCTURE: CPT

## 2025-01-28 LAB
25(OH)D3 SERPL-MCNC: 13.4 NG/ML
ALBUMIN SERPL ELPH-MCNC: 4.6 G/DL
ALP BLD-CCNC: 67 U/L
ALT SERPL-CCNC: 16 U/L
ANION GAP SERPL CALC-SCNC: 16 MMOL/L
AST SERPL-CCNC: 22 U/L
BASOPHILS # BLD AUTO: 0.02 K/UL
BASOPHILS NFR BLD AUTO: 0.4 %
BILIRUB SERPL-MCNC: 0.9 MG/DL
BUN SERPL-MCNC: 22 MG/DL
CALCIUM SERPL-MCNC: 9.7 MG/DL
CHLORIDE SERPL-SCNC: 101 MMOL/L
CHOLEST SERPL-MCNC: 228 MG/DL
CO2 SERPL-SCNC: 24 MMOL/L
CREAT SERPL-MCNC: 0.94 MG/DL
EGFR: 66 ML/MIN/1.73M2
EOSINOPHIL # BLD AUTO: 0.07 K/UL
EOSINOPHIL NFR BLD AUTO: 1.3 %
ERYTHROCYTE [SEDIMENTATION RATE] IN BLOOD BY WESTERGREN METHOD: 37 MM/HR
ESTIMATED AVERAGE GLUCOSE: 105 MG/DL
GLUCOSE SERPL-MCNC: 94 MG/DL
HBA1C MFR BLD HPLC: 5.3 %
HCT VFR BLD CALC: 38.5 %
HDLC SERPL-MCNC: 81 MG/DL
HGB BLD-MCNC: 12.1 G/DL
IMM GRANULOCYTES NFR BLD AUTO: 0.2 %
LDLC SERPL CALC-MCNC: 142 MG/DL
LYMPHOCYTES # BLD AUTO: 1.32 K/UL
LYMPHOCYTES NFR BLD AUTO: 25.3 %
MAN DIFF?: NORMAL
MCHC RBC-ENTMCNC: 31.4 G/DL
MCHC RBC-ENTMCNC: 31.4 PG
MCV RBC AUTO: 100 FL
MONOCYTES # BLD AUTO: 0.57 K/UL
MONOCYTES NFR BLD AUTO: 10.9 %
NEUTROPHILS # BLD AUTO: 3.22 K/UL
NEUTROPHILS NFR BLD AUTO: 61.9 %
NONHDLC SERPL-MCNC: 147 MG/DL
PLATELET # BLD AUTO: 307 K/UL
POTASSIUM SERPL-SCNC: 4.4 MMOL/L
PROT SERPL-MCNC: 8.5 G/DL
RBC # BLD: 3.85 M/UL
RBC # FLD: 14.5 %
SODIUM SERPL-SCNC: 141 MMOL/L
T3 SERPL-MCNC: 84 NG/DL
T3RU NFR SERPL: 0.7 TBI
TRIGL SERPL-MCNC: 35 MG/DL
TSH SERPL-ACNC: 1.81 UIU/ML
WBC # FLD AUTO: 5.21 K/UL

## 2025-02-03 ENCOUNTER — LABORATORY RESULT (OUTPATIENT)
Age: 70
End: 2025-02-03

## 2025-02-03 ENCOUNTER — APPOINTMENT (OUTPATIENT)
Dept: INTERNAL MEDICINE | Facility: CLINIC | Age: 70
End: 2025-02-03
Payer: MEDICARE

## 2025-02-03 ENCOUNTER — NON-APPOINTMENT (OUTPATIENT)
Age: 70
End: 2025-02-03

## 2025-02-03 VITALS
TEMPERATURE: 97 F | SYSTOLIC BLOOD PRESSURE: 151 MMHG | WEIGHT: 136 LBS | HEIGHT: 68 IN | DIASTOLIC BLOOD PRESSURE: 86 MMHG | BODY MASS INDEX: 20.61 KG/M2 | HEART RATE: 71 BPM | OXYGEN SATURATION: 98 %

## 2025-02-03 DIAGNOSIS — I10 ESSENTIAL (PRIMARY) HYPERTENSION: ICD-10-CM

## 2025-02-03 DIAGNOSIS — E78.00 PURE HYPERCHOLESTEROLEMIA, UNSPECIFIED: ICD-10-CM

## 2025-02-03 DIAGNOSIS — M81.0 AGE-RELATED OSTEOPOROSIS W/OUT CURRENT PATHOLOGICAL FRACTURE: ICD-10-CM

## 2025-02-03 DIAGNOSIS — K51.90 ULCERATIVE COLITIS, UNSPECIFIED, W/OUT COMPLICATIONS: ICD-10-CM

## 2025-02-03 DIAGNOSIS — F41.9 ANXIETY DISORDER, UNSPECIFIED: ICD-10-CM

## 2025-02-03 DIAGNOSIS — M06.9 RHEUMATOID ARTHRITIS, UNSPECIFIED: ICD-10-CM

## 2025-02-03 DIAGNOSIS — M25.50 PAIN IN UNSPECIFIED JOINT: ICD-10-CM

## 2025-02-03 DIAGNOSIS — R07.81 PLEURODYNIA: ICD-10-CM

## 2025-02-03 DIAGNOSIS — R59.0 LOCALIZED ENLARGED LYMPH NODES: ICD-10-CM

## 2025-02-03 DIAGNOSIS — R91.1 SOLITARY PULMONARY NODULE: ICD-10-CM

## 2025-02-03 PROCEDURE — 36415 COLL VENOUS BLD VENIPUNCTURE: CPT

## 2025-02-03 PROCEDURE — G0439: CPT

## 2025-02-03 PROCEDURE — 93000 ELECTROCARDIOGRAM COMPLETE: CPT

## 2025-02-03 PROCEDURE — 99214 OFFICE O/P EST MOD 30 MIN: CPT | Mod: 25

## 2025-02-04 LAB
APPEARANCE: CLEAR
BILIRUBIN URINE: NEGATIVE
BLOOD URINE: NEGATIVE
COLOR: YELLOW
CRP SERPL-MCNC: <3 MG/L
GLUCOSE QUALITATIVE U: NEGATIVE MG/DL
KETONES URINE: NEGATIVE MG/DL
LEUKOCYTE ESTERASE URINE: ABNORMAL
NITRITE URINE: NEGATIVE
PH URINE: 6.5
PROTEIN URINE: NEGATIVE MG/DL
RHEUMATOID FACT SER QL: 106 IU/ML
SPECIFIC GRAVITY URINE: 1.01
UROBILINOGEN URINE: 0.2 MG/DL

## 2025-02-25 NOTE — PATIENT PROFILE ADULT - FUNCTIONAL ASSESSMENT - DAILY ACTIVITY 3.
Pt called the clinic back and he said that he has a copy of the images on a disc however  he is in [place] and will not be back until tomorrow. He said he will look for those when he gets home and call back .  The location that pt had the images performed is permanently closed per online search [facility].      MRI center in [place], [place]  Permanently closed    4 = No assist / stand by assistance

## 2025-02-28 ENCOUNTER — OUTPATIENT (OUTPATIENT)
Dept: OUTPATIENT SERVICES | Facility: HOSPITAL | Age: 70
LOS: 1 days | End: 2025-02-28
Payer: MEDICARE

## 2025-02-28 ENCOUNTER — APPOINTMENT (OUTPATIENT)
Dept: NUCLEAR MEDICINE | Facility: IMAGING CENTER | Age: 70
End: 2025-02-28

## 2025-02-28 DIAGNOSIS — R91.1 SOLITARY PULMONARY NODULE: ICD-10-CM

## 2025-02-28 DIAGNOSIS — Z00.8 ENCOUNTER FOR OTHER GENERAL EXAMINATION: ICD-10-CM

## 2025-02-28 DIAGNOSIS — Z98.890 OTHER SPECIFIED POSTPROCEDURAL STATES: Chronic | ICD-10-CM

## 2025-02-28 PROCEDURE — 78816 PET IMAGE W/CT FULL BODY: CPT | Mod: 26,PI

## 2025-02-28 PROCEDURE — 78816 PET IMAGE W/CT FULL BODY: CPT

## 2025-02-28 PROCEDURE — A9552: CPT

## 2025-03-05 ENCOUNTER — NON-APPOINTMENT (OUTPATIENT)
Age: 70
End: 2025-03-05

## 2025-04-21 ENCOUNTER — APPOINTMENT (OUTPATIENT)
Dept: RADIOLOGY | Facility: IMAGING CENTER | Age: 70
End: 2025-04-21
Payer: MEDICARE

## 2025-04-21 ENCOUNTER — OUTPATIENT (OUTPATIENT)
Dept: OUTPATIENT SERVICES | Facility: HOSPITAL | Age: 70
LOS: 1 days | End: 2025-04-21
Payer: MEDICARE

## 2025-04-21 DIAGNOSIS — M81.0 AGE-RELATED OSTEOPOROSIS WITHOUT CURRENT PATHOLOGICAL FRACTURE: ICD-10-CM

## 2025-04-21 DIAGNOSIS — Z98.890 OTHER SPECIFIED POSTPROCEDURAL STATES: Chronic | ICD-10-CM

## 2025-04-21 PROCEDURE — 77080 DXA BONE DENSITY AXIAL: CPT

## 2025-04-21 PROCEDURE — 77080 DXA BONE DENSITY AXIAL: CPT | Mod: 26

## 2025-04-30 ENCOUNTER — NON-APPOINTMENT (OUTPATIENT)
Age: 70
End: 2025-04-30

## 2025-04-30 DIAGNOSIS — M81.0 AGE-RELATED OSTEOPOROSIS W/OUT CURRENT PATHOLOGICAL FRACTURE: ICD-10-CM

## 2025-05-05 ENCOUNTER — RX RENEWAL (OUTPATIENT)
Age: 70
End: 2025-05-05

## 2025-05-14 ENCOUNTER — NON-APPOINTMENT (OUTPATIENT)
Age: 70
End: 2025-05-14

## 2025-06-12 ENCOUNTER — APPOINTMENT (OUTPATIENT)
Dept: RHEUMATOLOGY | Facility: CLINIC | Age: 70
End: 2025-06-12
Payer: MEDICARE

## 2025-06-12 VITALS — DIASTOLIC BLOOD PRESSURE: 87 MMHG | SYSTOLIC BLOOD PRESSURE: 147 MMHG | OXYGEN SATURATION: 100 % | HEART RATE: 63 BPM

## 2025-06-12 VITALS
SYSTOLIC BLOOD PRESSURE: 155 MMHG | HEART RATE: 72 BPM | OXYGEN SATURATION: 99 % | TEMPERATURE: 98.1 F | RESPIRATION RATE: 16 BRPM | DIASTOLIC BLOOD PRESSURE: 93 MMHG

## 2025-06-12 PROCEDURE — 96374 THER/PROPH/DIAG INJ IV PUSH: CPT

## 2025-06-23 ENCOUNTER — NON-APPOINTMENT (OUTPATIENT)
Age: 70
End: 2025-06-23

## 2025-06-23 RX ORDER — AMOXICILLIN AND CLAVULANATE POTASSIUM 875; 125 MG/1; MG/1
875-125 TABLET, COATED ORAL TWICE DAILY
Qty: 20 | Refills: 0 | Status: ACTIVE | COMMUNITY
Start: 2025-06-23 | End: 1900-01-01

## 2025-06-23 RX ORDER — HYDROCODONE BITARTRATE AND HOMATROPINE METHYLBROMIDE 1.5; 5 MG/5ML; MG/5ML
5-1.5 SOLUTION ORAL
Qty: 480 | Refills: 0 | Status: ACTIVE | COMMUNITY
Start: 2025-06-23 | End: 1900-01-01

## 2025-06-25 ENCOUNTER — NON-APPOINTMENT (OUTPATIENT)
Age: 70
End: 2025-06-25

## 2025-06-25 ENCOUNTER — OUTPATIENT (OUTPATIENT)
Dept: OUTPATIENT SERVICES | Facility: HOSPITAL | Age: 70
LOS: 1 days | End: 2025-06-25
Payer: MEDICARE

## 2025-06-25 ENCOUNTER — APPOINTMENT (OUTPATIENT)
Dept: INTERNAL MEDICINE | Facility: CLINIC | Age: 70
End: 2025-06-25
Payer: MEDICARE

## 2025-06-25 ENCOUNTER — APPOINTMENT (OUTPATIENT)
Dept: CT IMAGING | Facility: IMAGING CENTER | Age: 70
End: 2025-06-25
Payer: MEDICARE

## 2025-06-25 VITALS
OXYGEN SATURATION: 96 % | WEIGHT: 134 LBS | DIASTOLIC BLOOD PRESSURE: 95 MMHG | TEMPERATURE: 97.2 F | SYSTOLIC BLOOD PRESSURE: 170 MMHG | HEART RATE: 78 BPM | HEIGHT: 68 IN | BODY MASS INDEX: 20.31 KG/M2

## 2025-06-25 VITALS — DIASTOLIC BLOOD PRESSURE: 86 MMHG | SYSTOLIC BLOOD PRESSURE: 164 MMHG

## 2025-06-25 DIAGNOSIS — Z98.890 OTHER SPECIFIED POSTPROCEDURAL STATES: Chronic | ICD-10-CM

## 2025-06-25 DIAGNOSIS — R05.9 COUGH, UNSPECIFIED: ICD-10-CM

## 2025-06-25 PROBLEM — R79.89 TROPONIN LEVEL ELEVATED: Status: ACTIVE | Noted: 2025-06-25

## 2025-06-25 PROCEDURE — 71250 CT THORAX DX C-: CPT | Mod: 26

## 2025-06-25 PROCEDURE — G2211 COMPLEX E/M VISIT ADD ON: CPT

## 2025-06-25 PROCEDURE — 71250 CT THORAX DX C-: CPT

## 2025-06-25 PROCEDURE — 99214 OFFICE O/P EST MOD 30 MIN: CPT

## 2025-06-25 PROCEDURE — 36415 COLL VENOUS BLD VENIPUNCTURE: CPT

## 2025-06-25 RX ORDER — PREDNISONE 20 MG/1
20 TABLET ORAL
Qty: 20 | Refills: 0 | Status: ACTIVE | COMMUNITY
Start: 2025-06-25 | End: 1900-01-01

## 2025-06-26 LAB
25(OH)D3 SERPL-MCNC: 22.1 NG/ML
ALBUMIN SERPL ELPH-MCNC: 4.5 G/DL
ALP BLD-CCNC: 85 U/L
ALT SERPL-CCNC: 10 U/L
ANION GAP SERPL CALC-SCNC: 15 MMOL/L
AST SERPL-CCNC: 17 U/L
BASOPHILS # BLD AUTO: 0.04 K/UL
BASOPHILS NFR BLD AUTO: 0.5 %
BILIRUB SERPL-MCNC: 0.7 MG/DL
BUN SERPL-MCNC: 17 MG/DL
CALCIUM SERPL-MCNC: 9.4 MG/DL
CHLORIDE SERPL-SCNC: 102 MMOL/L
CK SERPL-CCNC: 49 U/L
CO2 SERPL-SCNC: 24 MMOL/L
CREAT SERPL-MCNC: 0.89 MG/DL
EGFRCR SERPLBLD CKD-EPI 2021: 70 ML/MIN/1.73M2
EOSINOPHIL # BLD AUTO: 0.11 K/UL
EOSINOPHIL NFR BLD AUTO: 1.3 %
GLUCOSE SERPL-MCNC: 117 MG/DL
HCT VFR BLD CALC: 36.6 %
HGB BLD-MCNC: 11.7 G/DL
IMM GRANULOCYTES NFR BLD AUTO: 0.4 %
LYMPHOCYTES # BLD AUTO: 1.71 K/UL
LYMPHOCYTES NFR BLD AUTO: 20.3 %
MAN DIFF?: NORMAL
MCHC RBC-ENTMCNC: 31.3 PG
MCHC RBC-ENTMCNC: 32 G/DL
MCV RBC AUTO: 97.9 FL
MONOCYTES # BLD AUTO: 0.54 K/UL
MONOCYTES NFR BLD AUTO: 6.4 %
NEUTROPHILS # BLD AUTO: 5.99 K/UL
NEUTROPHILS NFR BLD AUTO: 71.1 %
PLATELET # BLD AUTO: 316 K/UL
POTASSIUM SERPL-SCNC: 3.7 MMOL/L
PROT SERPL-MCNC: 8.1 G/DL
RBC # BLD: 3.74 M/UL
RBC # FLD: 14.2 %
SODIUM SERPL-SCNC: 141 MMOL/L
T3FREE SERPL-MCNC: 3.49 PG/ML
T4 FREE SERPL-MCNC: 1.2 NG/DL
TROPONIN I SERPL HS-MCNC: 22 NG/L
TSH SERPL-ACNC: 2.73 UIU/ML
WBC # FLD AUTO: 8.42 K/UL

## 2025-06-27 ENCOUNTER — NON-APPOINTMENT (OUTPATIENT)
Age: 70
End: 2025-06-27

## 2025-06-30 PROBLEM — H91.93 DECREASED HEARING OF BOTH EARS: Status: ACTIVE | Noted: 2025-06-30

## 2025-07-03 PROBLEM — E86.0 DEHYDRATION: Status: ACTIVE | Noted: 2025-07-03

## 2025-07-03 PROBLEM — R55 SYNCOPE: Status: ACTIVE | Noted: 2025-07-03

## 2025-07-21 ENCOUNTER — TRANSCRIPTION ENCOUNTER (OUTPATIENT)
Age: 70
End: 2025-07-21

## 2025-08-18 ENCOUNTER — APPOINTMENT (OUTPATIENT)
Dept: INTERNAL MEDICINE | Facility: CLINIC | Age: 70
End: 2025-08-18
Payer: MEDICARE

## 2025-08-18 VITALS
HEIGHT: 68 IN | WEIGHT: 134 LBS | OXYGEN SATURATION: 99 % | BODY MASS INDEX: 20.31 KG/M2 | DIASTOLIC BLOOD PRESSURE: 90 MMHG | SYSTOLIC BLOOD PRESSURE: 147 MMHG | TEMPERATURE: 96.8 F | HEART RATE: 87 BPM

## 2025-08-18 DIAGNOSIS — R05.9 COUGH, UNSPECIFIED: ICD-10-CM

## 2025-08-18 DIAGNOSIS — J20.9 ACUTE BRONCHITIS, UNSPECIFIED: ICD-10-CM

## 2025-08-18 PROCEDURE — 99213 OFFICE O/P EST LOW 20 MIN: CPT

## 2025-08-18 RX ORDER — DOXYCYCLINE HYCLATE 100 MG/1
100 CAPSULE ORAL
Qty: 14 | Refills: 0 | Status: ACTIVE | COMMUNITY
Start: 2025-08-18 | End: 1900-01-01

## 2025-08-18 RX ORDER — BENZONATATE 200 MG/1
200 CAPSULE ORAL
Qty: 30 | Refills: 1 | Status: ACTIVE | COMMUNITY
Start: 2025-08-18 | End: 1900-01-01

## 2025-08-19 LAB
INFLUENZA A RESULT: NOT DETECTED
INFLUENZA B RESULT: NOT DETECTED
RESP SYN VIRUS RESULT: NOT DETECTED
SARS-COV-2 RESULT: NOT DETECTED

## 2025-09-18 ENCOUNTER — APPOINTMENT (OUTPATIENT)
Dept: GASTROENTEROLOGY | Facility: CLINIC | Age: 70
End: 2025-09-18
Payer: MEDICARE

## 2025-09-18 VITALS
SYSTOLIC BLOOD PRESSURE: 122 MMHG | WEIGHT: 137 LBS | HEART RATE: 74 BPM | BODY MASS INDEX: 20.76 KG/M2 | DIASTOLIC BLOOD PRESSURE: 80 MMHG | HEIGHT: 68 IN | OXYGEN SATURATION: 96 %

## 2025-09-18 DIAGNOSIS — K51.90 ULCERATIVE COLITIS, UNSPECIFIED, W/OUT COMPLICATIONS: ICD-10-CM

## 2025-09-18 PROCEDURE — 99214 OFFICE O/P EST MOD 30 MIN: CPT
